# Patient Record
Sex: FEMALE | Race: WHITE | Employment: FULL TIME | RURAL
[De-identification: names, ages, dates, MRNs, and addresses within clinical notes are randomized per-mention and may not be internally consistent; named-entity substitution may affect disease eponyms.]

---

## 2017-01-09 DIAGNOSIS — N95.9 MENOPAUSAL DISORDER: ICD-10-CM

## 2017-02-06 ENCOUNTER — OFFICE VISIT (OUTPATIENT)
Dept: INTERNAL MEDICINE CLINIC | Age: 77
End: 2017-02-06

## 2017-02-06 VITALS
RESPIRATION RATE: 20 BRPM | TEMPERATURE: 96.1 F | SYSTOLIC BLOOD PRESSURE: 180 MMHG | HEIGHT: 61 IN | DIASTOLIC BLOOD PRESSURE: 80 MMHG | OXYGEN SATURATION: 98 % | BODY MASS INDEX: 25.68 KG/M2 | HEART RATE: 73 BPM | WEIGHT: 136 LBS

## 2017-02-06 DIAGNOSIS — R41.3 MEMORY LOSS: Primary | ICD-10-CM

## 2017-02-06 DIAGNOSIS — Z23 ENCOUNTER FOR IMMUNIZATION: ICD-10-CM

## 2017-02-06 NOTE — MR AVS SNAPSHOT
Visit Information Date & Time Provider Department Dept. Phone Encounter #  
 2/6/2017  1:30 PM Mohamud Yung, 1 [facility] [phone] [id number] Your Appointments 4/3/2017  8:15 AM  
ROUTINE CARE with MD Mayelin Ken ([facility]) Appt Note: f/u on general health $0 cp ls 12/2/16  
 [address] [place] [address]  
[phone]  
  
   
 [address] Upcoming Health Maintenance Date Due DTaP/Tdap/Td series (1 - Tdap) 8/21/1961 GLAUCOMA SCREENING Q2Y 11/1/2015 MEDICARE YEARLY EXAM 12/3/2017 Allergies as of 2/6/2017  Review Complete On: 2/6/2017 By: Mohamud Yung NP Severity Noted Reaction Type Reactions Naprosyn [Naproxen]  05/18/2015    Nausea Only Current Immunizations  Reviewed on 8/25/2016 Name Date H1N1 FLU VACCINE 3/24/2010 Influenza Vaccine 8/20/2014, 9/11/2013 Influenza Vaccine Neela Joaquim) 10/1/2015 Influenza Vaccine (Quad) PF 8/25/2016 Influenza Vaccine Split 9/13/2012, 10/6/2011 Influenza Vaccine Whole 10/7/2009 Pneumococcal Conjugate (PCV-13) 9/30/2016 Pneumococcal Vaccine (Unspecified Type) 4/28/2010 Tdap  Incomplete Zoster Vaccine, Live 4/8/2015 Not reviewed this visit You Were Diagnosed With   
  
 Codes Comments Memory loss    -  Primary ICD-10-CM: R41.3 ICD-9-CM: 780.93 Encounter for immunization     ICD-10-CM: R96 ICD-9-CM: V03.89 Vitals BP Pulse Temp Resp Height(growth percentile) Weight(growth percentile) 180/80 (BP 1 Location: Left arm, BP Patient Position: Sitting) 73 96.1 °F (35.6 °C) (Oral) 20 5' 1\" (1.549 m) 136 lb (61.7 kg) SpO2 BMI OB Status Smoking Status 98% 25.7 kg/m2 Hysterectomy Former Smoker BMI and BSA Data Body Mass Index Body Surface Area 25.7 kg/m 2 1.63 m 2 Preferred Pharmacy Pharmacy Name Phone [facility] [address], [address] [phone] Your Updated Medication List  
  
   
This list is accurate as of: 2/6/17  2:07 PM.  Always use your most recent med list.  
  
  
  
  
 alendronate 70 mg tablet Commonly known as:  FOSAMAX Take 1 Tab by mouth every seven (7) days. Calcium-Cholecalciferol (D3) 500 mg(1,250mg) -400 unit Tab Take  by mouth. co-enzyme Q-10 100 mg capsule Commonly known as:  CO Q-10 Take 1 capsule by mouth daily. Lactobacillus acidophilus 1 mg Wafr Take 1 capsule by mouth three (3) times daily. lisinopril 5 mg tablet Commonly known as:  Jacklynn Pares Take 1 Tab by mouth daily. pravastatin 40 mg tablet Commonly known as:  PRAVACHOL Take 1 Tab by mouth nightly. For cholesterol  
  
 raNITIdine 150 mg tablet Commonly known as:  ZANTAC TAKE ONE TABLET BY MOUTH TWICE DAILY FOR  ACID  REFLUX  
  
 traZODone 50 mg tablet Commonly known as:  Junella Brewster Up to 3 per night as needed We Performed the Following TETANUS, DIPHTHERIA TOXOIDS AND ACELLULAR PERTUSSIS VACCINE (TDAP), IN INDIVIDS. >=7, IM [id number] CPT(R)] Introducing [facility] & HEALTH SERVICES! Dear Ludmila Meyer: Thank you for requesting a payleven account. Our records indicate that you have previously registered for a payleven account but its currently inactive. Please call our payleven support line at [phone]. Additional Information If you have questions, please visit the Frequently Asked Questions section of the payleven website at [email or web address] Remember, payleven is NOT to be used for urgent needs. For medical emergencies, dial 911. Now available from your iPhone and Android! Please provide this summary of care documentation to your next provider. Your primary care clinician is listed as Arabella Senior.  If you have any questions after today's visit, please call [phone].

## 2017-02-06 NOTE — PROGRESS NOTES
In today for dementia testing - lots of trouble with her memory  Jillian Macdonald, [place]RA[place]  2/8/0714  8:32 PM

## 2017-02-15 NOTE — PROGRESS NOTES
HISTORY OF PRESENT ILLNESS  Laura Newell is a 67 y.o. female. HPI  Chief Complaint   Patient presents with    Memory Loss     wants dementia testing     States feels cannot remember names, actions. States does remember family members, how to get home and other familiar community places  Patient agrees to MMSE to evaluate short term memory loss. Request Tdap    Review of Systems   Constitutional: Negative. Negative for chills, fever and weight loss. HENT: Negative. Eyes: Negative. Respiratory: Negative. Cardiovascular: Negative for chest pain and leg swelling. Gastrointestinal: Negative. Negative for abdominal pain, diarrhea, nausea and vomiting. Genitourinary: Negative. Musculoskeletal: Negative. Skin: Negative. Neurological: Negative. Negative for dizziness, tingling and tremors. Psychiatric/Behavioral: Positive for memory loss. Physical Exam   Constitutional: She is oriented to person, place, and time. She appears well-developed and well-nourished. No distress. HENT:   Head: Normocephalic and atraumatic. Eyes: Conjunctivae are normal.   Cardiovascular: Normal rate, regular rhythm, normal heart sounds and intact distal pulses. Exam reveals no gallop and no friction rub. No murmur heard. Pulmonary/Chest: Effort normal and breath sounds normal. No respiratory distress. She has no wheezes. She has no rales. Musculoskeletal: Normal range of motion. Neurological: She is alert and oriented to person, place, and time. Skin: Skin is warm and dry. She is not diaphoretic. Psychiatric: She has a normal mood and affect. Her behavior is normal.   Nursing note and vitals reviewed. Plan of care and AVS reviewed with patient who verbalized understanding. ASSESSMENT and PLAN    ICD-10-CM ICD-9-CM    1. Memory loss R41.3 780.93    2. Encounter for immunization Z23 V03.89 TETANUS, DIPHTHERIA TOXOIDS AND ACELLULAR PERTUSSIS VACCINE (TDAP), IN INDIVIDS. >=7, IM   3. BMI 25 29.9    MMSE administered  30/30  Encouragement given to patient regarding memory loss. Encouraged to write on large calendar, keep storage drawers for specific items and to label objects. Encouraged to keep regular appointment with Dr. Luan Kanner. Discussed the patient's BMI with her. The BMI follow up plan is as follows:     I have reviewed/discussed the above normal BMI with the patient. I have recommended the following interventions: monitor weight . The plan is as follows: Monitor weight.

## 2017-04-03 ENCOUNTER — OFFICE VISIT (OUTPATIENT)
Dept: INTERNAL MEDICINE CLINIC | Age: 77
End: 2017-04-03

## 2017-04-03 VITALS
HEIGHT: 61 IN | DIASTOLIC BLOOD PRESSURE: 73 MMHG | BODY MASS INDEX: 25.11 KG/M2 | TEMPERATURE: 96.6 F | HEART RATE: 61 BPM | RESPIRATION RATE: 16 BRPM | WEIGHT: 133 LBS | OXYGEN SATURATION: 99 % | SYSTOLIC BLOOD PRESSURE: 149 MMHG

## 2017-04-03 DIAGNOSIS — N39.41 URGE INCONTINENCE OF URINE: Primary | ICD-10-CM

## 2017-04-03 DIAGNOSIS — E78.2 MIXED HYPERLIPIDEMIA: ICD-10-CM

## 2017-04-03 DIAGNOSIS — R73.03 PRE-DIABETES: ICD-10-CM

## 2017-04-03 DIAGNOSIS — I10 ESSENTIAL HYPERTENSION: ICD-10-CM

## 2017-04-03 LAB
BILIRUB UR QL STRIP: NEGATIVE
GLUCOSE UR-MCNC: NEGATIVE MG/DL
KETONES P FAST UR STRIP-MCNC: NEGATIVE MG/DL
PH UR STRIP: 5.5 [PH] (ref 4.6–8)
PROT UR QL STRIP: NEGATIVE MG/DL
SP GR UR STRIP: 1.02 (ref 1–1.03)
UA UROBILINOGEN AMB POC: NORMAL (ref 0.2–1)
URINALYSIS CLARITY POC: CLEAR
URINALYSIS COLOR POC: YELLOW
URINE BLOOD POC: NEGATIVE
URINE LEUKOCYTES POC: NORMAL
URINE NITRITES POC: NEGATIVE

## 2017-04-03 RX ORDER — TOLTERODINE 2 MG/1
2 CAPSULE, EXTENDED RELEASE ORAL DAILY
Qty: 30 CAP | Refills: 5 | Status: SHIPPED | OUTPATIENT
Start: 2017-04-03 | End: 2017-05-15 | Stop reason: SDUPTHER

## 2017-04-03 NOTE — PATIENT INSTRUCTIONS
Bladder Training: Care Instructions  Your Care Instructions  Bladder training is used to treat urge incontinence and stress incontinence. Urge incontinence means that the need to urinate comes on so fast that you can't get to a toilet in time. Stress incontinence means that you leak urine because of pressure on your bladder. For example, it may happen when you laugh, cough, or lift something heavy. Bladder training can increase how long you can wait before you have to urinate. It can also help your bladder hold more urine. And it can give you better control over the urge to urinate. It is important to remember that bladder training takes a few weeks to a few months to make a difference. You may not see results right away, but don't give up. Follow-up care is a key part of your treatment and safety. Be sure to make and go to all appointments, and call your doctor if you are having problems. It's also a good idea to know your test results and keep a list of the medicines you take. How can you care for yourself at home? Work with your doctor to come up with a bladder training program that is right for you. You may use one or more of the following methods. Delayed urination  · In the beginning, try to keep from urinating for 5 minutes after you first feel the need to go. · While you wait, take deep, slow breaths to relax. Kegel exercises can also help you delay the need to go to the bathroom. · After some practice, when you can easily wait 5 minutes to urinate, try to wait 10 minutes before you urinate. · Slowly increase the waiting period until you are able to control when you have to urinate. Scheduled urination  · Empty your bladder when you first wake up in the morning. · Schedule times throughout the day when you will urinate. · Start by going to the bathroom every hour, even if you don't need to go. · Slowly increase the time between trips to the bathroom.   · When you have found a schedule that works well for you, keep doing it. · If you wake up during the night and have to urinate, do it. Apply your schedule to waking hours only. Kegel exercises  These tighten and strengthen pelvic muscles, which can help you control the flow of urine. To do Kegel exercises:  · Squeeze the same muscles you would use to stop your urine. Your belly and thighs should not move. · Hold the squeeze for 3 seconds, and then relax for 3 seconds. · Start with 3 seconds. Then add 1 second each week until you are able to squeeze for 10 seconds. · Repeat the exercise 10 to 15 times a session. Do three or more sessions a day. When should you call for help? Watch closely for changes in your health, and be sure to contact your doctor if:  · Your incontinence is getting worse. · You do not get better as expected. Where can you learn more? Go to http://radha-zunilda.info/. Enter J154 in the search box to learn more about \"Bladder Training: Care Instructions. \"  Current as of: August 12, 2016  Content Version: 11.2  © 7140-4407 Healthwise, Incorporated. Care instructions adapted under license by [employer] (which disclaims liability or warranty for this information). If you have questions about a medical condition or this instruction, always ask your healthcare professional. [address] any warranty or liability for your use of this information.

## 2017-04-03 NOTE — PROGRESS NOTES
Subjective: Allyson Orozco is a 67 y.o. female who presents for follow up of hypertension and hyperlipidemia. New concerns: sone  recently of aneurysm. Bled to death from aneurysm when ruptured, Tx from [place] to [facility], surgery was too late. Lately noted some urine frequency, no dysuria. Sx x weeks, wants to be checked for DM. Has some pre Dm but no meds 4 that and BS min elevated. Takes chol and HTN meds, no issues. Past Surgical History:   Procedure Laterality Date    ABDOMEN SURGERY PROC UNLISTED      HX CATARACT REMOVAL      HX COLONOSCOPY      with EGD    HX GYN      hysterectomy    HX GYN      vaginal delivery x 3    HX KNEE REPLACEMENT Left 2016       Current Outpatient Prescriptions   Medication Sig Dispense Refill    raNITIdine (ZANTAC) 150 mg tablet TAKE ONE TABLET BY MOUTH TWICE DAILY FOR  ACID  REFLUX 60 Tab 11    alendronate (FOSAMAX) 70 mg tablet Take 1 Tab by mouth every seven (7) days. 5 Tab 5    pravastatin (PRAVACHOL) 40 mg tablet Take 1 Tab by mouth nightly. For cholesterol 90 Tab 3    lisinopril (PRINIVIL, ZESTRIL) 5 mg tablet Take 1 Tab by mouth daily. 90 Tab 1    traZODone (DESYREL) 50 mg tablet Up to 3 per night as needed 90 Tab 5    Calcium-Cholecalciferol, D3, 500 mg(1,250mg) -400 unit tab Take  by mouth.  lactobacillus acidophilus 1 mg wafr Take 1 capsule by mouth three (3) times daily. 90 Wafer 0    co-enzyme Q-10 (CO Q-10) 100 mg capsule Take 1 capsule by mouth daily. 90 capsule 1     Allergies   Allergen Reactions    Naprosyn [Naproxen] Nausea Only       Diet and Lifestyle: nonsmoker    Cardiovascular ROS: taking medications as instructed, no medication side effects noted, no TIA's, no chest pain on exertion, no dyspnea on exertion, no swelling of ankles. Review of Systems, additional:  Pertinent items are noted in HPI.       Social History   Substance Use Topics    Smoking status: Former Smoker     Packs/day: 1.00 Years: 0.00     Quit date: 5/11/1990    Smokeless tobacco: Never Used    Alcohol use No        Lab Results  Component Value Date/Time   Cholesterol, total 166 02/12/2016 02:19 PM   HDL Cholesterol 51 02/12/2016 02:19 PM   LDL, calculated 83 02/12/2016 02:19 PM   Triglyceride 160 02/12/2016 02:19 PM       Lab Results  Component Value Date/Time   GFR est AA 70 12/02/2016 09:06 AM   GFR est non-AA 61 12/02/2016 09:06 AM   Creatinine 0.91 12/02/2016 09:06 AM   BUN 16 12/02/2016 09:06 AM   Sodium 140 12/02/2016 09:06 AM   Potassium 4.9 12/02/2016 09:06 AM   Chloride 101 12/02/2016 09:06 AM   CO2 24 12/02/2016 09:06 AM      Lab Results   Component Value Date/Time    Glucose 107 12/02/2016 09:06 AM             Objective:     Physical exam significant for the following: WNL sad    Visit Vitals    /73 (BP 1 Location: Left arm, BP Patient Position: Sitting)    Pulse 61    Temp 96.6 °F (35.9 °C) (Oral)    Resp 16    Ht 5' 1\" (1.549 m)    Wt 133 lb (60.3 kg)    SpO2 99%    BMI 25.13 kg/m2     Appearance: alert, well appearing, and in no distress. General exam: CVS exam BP noted to be well controlled today in office, S1, S2 normal, no gallop, no murmur, chest clear, no JVD, no HSM, no edema. .   Assessment/Plan:     hypertension stable, hyperlipidemia stable. ICD-10-CM ICD-9-CM    1. Urge incontinence of urine N39.41 788.31 AMB POC URINALYSIS DIP STICK AUTO W/O MICRO      CULTURE, URINE   2. Mixed hyperlipidemia E78.2 272.2    3. Essential hypertension I10 401.9    4. Pre-diabetes R73.03 790.29 AMB POC GLUCOSE BLOOD, BY GLUCOSE MONITORING DEVICE      AMB POC GLUCOSE BLOOD, BY GLUCOSE MONITORING DEVICE     Orders Placed This Encounter    CULTURE, URINE    AMB POC URINALYSIS DIP STICK AUTO W/O MICRO    AMB POC GLUCOSE BLOOD, BY GLUCOSE MONITORING DEVICE    AMB POC GLUCOSE BLOOD, BY GLUCOSE MONITORING DEVICE    tolterodine ER (DETROL-LA) 2 mg ER capsule     Sig: Take 1 Cap by mouth daily.      Dispense: 30 Cap     Refill:  5     See patient instructions, went over them personally with the patient. Emphasized compliance. Questions answered. Patient states that they understand the plan of action and will call if there are any issues or misunderstandings. Follow-up Disposition:  Return in about 6 weeks (around 5/15/2017) for routine follow up.

## 2017-04-03 NOTE — PROGRESS NOTES
Patient wants to have her blood sugar checked - fasting today - trouble controlling bladder  Umu Meyers LPN  0/6/0909  3:14 AM  Oldest son passed away on 3/29/17  Umu Meyers LPN  5/5/2171  1:61 AM

## 2017-04-03 NOTE — MR AVS SNAPSHOT
Visit Information Date & Time Provider Department Dept. Phone Encounter #  
 4/3/2017  8:15 AM Mandy Olivares  C[place]thia [place] [id number] Follow-up Instructions Return in about 6 weeks (around 5/15/2017) for routine follow up. Upcoming Health Maintenance Date Due  
 GLAUCOMA SCREENING Q2Y 4/30/2017* MEDICARE YEARLY EXAM 12/3/2017 DTaP/Tdap/Td series (2 - Td) 2/6/2027 *Topic was postponed. The date shown is not the original due date. Allergies as of 4/3/2017  Review Complete On: 4/3/2017 By: Minerva Byrd LPN Severity Noted Reaction Type Reactions Naprosyn [Naproxen]  05/18/2015    Nausea Only Current Immunizations  Reviewed on 2/6/2017 Name Date H1N1 FLU VACCINE 3/24/2010 Influenza Vaccine 8/20/2014, 9/11/2013 Influenza Vaccine Britton Mitten) 10/1/2015 Influenza Vaccine (Quad) PF 8/25/2016 Influenza Vaccine Split 9/13/2012, 10/6/2011 Influenza Vaccine Whole 10/7/2009 Pneumococcal Conjugate (PCV-13) 9/30/2016 Pneumococcal Vaccine (Unspecified Type) 4/28/2010 Tdap 2/6/2017 Zoster Vaccine, Live 4/8/2015 Not reviewed this visit You Were Diagnosed With   
  
 Codes Comments Urge incontinence of urine    -  Primary ICD-10-CM: N39.41 
ICD-9-CM: 788.31 Mixed hyperlipidemia     ICD-10-CM: E78.2 ICD-9-CM: 272.2 Essential hypertension     ICD-10-CM: I10 
ICD-9-CM: 401.9 Vitals BP Pulse Temp Resp Height(growth percentile) Weight(growth percentile) 149/73 (BP 1 Location: Left arm, BP Patient Position: Sitting) 61 96.6 °F (35.9 °C) (Oral) 16 5' 1\" (1.549 m) 133 lb (60.3 kg) SpO2 BMI OB Status Smoking Status 99% 25.13 kg/m2 Hysterectomy Former Smoker Vitals History BMI and BSA Data Body Mass Index Body Surface Area  
 25.13 kg/m 2 1.61 m 2 Preferred Pharmacy Pharmacy Name Phone  [facility] [address] [facility] [phone] Your Updated Medication List  
  
   
This list is accurate as of: 4/3/17  9:41 AM.  Always use your most recent med list.  
  
  
  
  
 alendronate 70 mg tablet Commonly known as:  FOSAMAX Take 1 Tab by mouth every seven (7) days. Calcium-Cholecalciferol (D3) 500 mg(1,250mg) -400 unit Tab Take  by mouth. co-enzyme Q-10 100 mg capsule Commonly known as:  CO Q-10 Take 1 capsule by mouth daily. Lactobacillus acidophilus 1 mg Wafr Take 1 capsule by mouth three (3) times daily. lisinopril 5 mg tablet Commonly known as:  Jeffrey Leaver Take 1 Tab by mouth daily. pravastatin 40 mg tablet Commonly known as:  PRAVACHOL Take 1 Tab by mouth nightly. For cholesterol  
  
 raNITIdine 150 mg tablet Commonly known as:  ZANTAC TAKE ONE TABLET BY MOUTH TWICE DAILY FOR  ACID  REFLUX  
  
 tolterodine ER 2 mg ER capsule Commonly known as:  DETROL-LA Take 1 Cap by mouth daily. traZODone 50 mg tablet Commonly known as:  Shelva Okemah Up to 3 per night as needed Prescriptions Sent to Pharmacy Refills  
 tolterodine ER (DETROL-LA) 2 mg ER capsule 5 Sig: Take 1 Cap by mouth daily. Class: Normal  
 Pharmacy: [id number] [facility] [place] [address] [facility] Ph #: [phone] Route: Oral  
  
We Performed the Following AMB POC URINALYSIS DIP STICK AUTO W/O MICRO [66056 CPT(R)] CULTURE, URINE [id number] CPT(R)] Follow-up Instructions Return in about 6 weeks (around 5/15/2017) for routine follow up. Patient Instructions Bladder Training: Care Instructions Your Care Instructions Bladder training is used to treat urge incontinence and stress incontinence. Urge incontinence means that the need to urinate comes on so fast that you can't get to a toilet in time. Stress incontinence means that you leak urine because of pressure on your bladder.  For example, it may happen when you laugh, cough, or lift something heavy. Bladder training can increase how long you can wait before you have to urinate. It can also help your bladder hold more urine. And it can give you better control over the urge to urinate. It is important to remember that bladder training takes a few weeks to a few months to make a difference. You may not see results right away, but don't give up. Follow-up care is a key part of your treatment and safety. Be sure to make and go to all appointments, and call your doctor if you are having problems. It's also a good idea to know your test results and keep a list of the medicines you take. How can you care for yourself at home? Work with your doctor to come up with a bladder training program that is right for you. You may use one or more of the following methods. Delayed urination · In the beginning, try to keep from urinating for 5 minutes after you first feel the need to go. · While you wait, take deep, slow breaths to relax. Kegel exercises can also help you delay the need to go to the bathroom. · After some practice, when you can easily wait 5 minutes to urinate, try to wait 10 minutes before you urinate. · Slowly increase the waiting period until you are able to control when you have to urinate. Scheduled urination · Empty your bladder when you first wake up in the morning. · Schedule times throughout the day when you will urinate. · Start by going to the bathroom every hour, even if you don't need to go. · Slowly increase the time between trips to the bathroom. · When you have found a schedule that works well for you, keep doing it. · If you wake up during the night and have to urinate, do it. Apply your schedule to waking hours only. Kegel exercises These tighten and strengthen pelvic muscles, which can help you control the flow of urine. To do Kegel exercises: 
· Squeeze the same muscles you would use to stop your urine.  Your belly and thighs should not move. · Hold the squeeze for 3 seconds, and then relax for 3 seconds. · Start with 3 seconds. Then add 1 second each week until you are able to squeeze for 10 seconds. · Repeat the exercise 10 to 15 times a session. Do three or more sessions a day. When should you call for help? Watch closely for changes in your health, and be sure to contact your doctor if: 
· Your incontinence is getting worse. · You do not get better as expected. Where can you learn more? Go to http://radha-zunilda.info/. Enter K427 in the search box to learn more about \"Bladder Training: Care Instructions. \" Current as of: August 12, 2016 Content Version: 11.2 © 5429-7614 Edutor. Care instructions adapted under license by [employer] (which disclaims liability or warranty for this information). If you have questions about a medical condition or this instruction, always ask your healthcare professional. [place] [address] any warranty or liability for your use of this information. Introducing [facility] & HEALTH SERVICES! Dear Linnea Taveras: Thank you for requesting a Tumri account. Our records indicate that you have previously registered for a Tumri account but its currently inactive. Please call our Tumri support line at [phone]. Additional Information If you have questions, please visit the Frequently Asked Questions section of the Tumri website at [email or web address] [email or web address]/. Remember, Tumri is NOT to be used for urgent needs. For medical emergencies, dial 911. Now available from your iPhone and Android! Please provide this summary of care documentation to your next provider. Your primary care clinician is listed as Verner Knack. If you have any questions after today's visit, please call [phone].

## 2017-04-04 LAB
BACTERIA UR CULT: ABNORMAL
GLUCOSE POC: 107 MG/DL

## 2017-04-05 ENCOUNTER — TELEPHONE (OUTPATIENT)
Dept: INTERNAL MEDICINE CLINIC | Age: 77
End: 2017-04-05

## 2017-04-06 ENCOUNTER — TELEPHONE (OUTPATIENT)
Dept: INTERNAL MEDICINE CLINIC | Age: 77
End: 2017-04-06

## 2017-04-06 RX ORDER — AMOXICILLIN 500 MG/1
500 TABLET, FILM COATED ORAL 3 TIMES DAILY
Qty: 15 TAB | Refills: 0 | Status: SHIPPED | OUTPATIENT
Start: 2017-04-06 | End: 2017-04-11

## 2017-04-06 NOTE — TELEPHONE ENCOUNTER
Patient states that Dr Sukhdeep Bhagat left her a message that she needed an antibiotic, she is at work and not able to talk while she is there on her phone - she requests that he send the antibiotic she needs to [facility], and she will pick it up today  Dania Hdez LPN  9/9/8130  4:44 AM

## 2017-05-15 ENCOUNTER — OFFICE VISIT (OUTPATIENT)
Dept: INTERNAL MEDICINE CLINIC | Age: 77
End: 2017-05-15

## 2017-05-15 VITALS
BODY MASS INDEX: 25.49 KG/M2 | SYSTOLIC BLOOD PRESSURE: 142 MMHG | OXYGEN SATURATION: 98 % | WEIGHT: 135 LBS | HEIGHT: 61 IN | TEMPERATURE: 97.1 F | DIASTOLIC BLOOD PRESSURE: 80 MMHG | HEART RATE: 79 BPM | RESPIRATION RATE: 16 BRPM

## 2017-05-15 DIAGNOSIS — N18.1 CRI (CHRONIC RENAL INSUFFICIENCY), STAGE 1: ICD-10-CM

## 2017-05-15 DIAGNOSIS — K21.9 GASTROESOPHAGEAL REFLUX DISEASE WITHOUT ESOPHAGITIS: ICD-10-CM

## 2017-05-15 DIAGNOSIS — M81.0 OSTEOPOROSIS WITHOUT CURRENT PATHOLOGICAL FRACTURE, UNSPECIFIED OSTEOPOROSIS TYPE: ICD-10-CM

## 2017-05-15 DIAGNOSIS — I10 ESSENTIAL HYPERTENSION: Primary | ICD-10-CM

## 2017-05-15 DIAGNOSIS — E78.2 MIXED HYPERLIPIDEMIA: ICD-10-CM

## 2017-05-15 PROBLEM — N18.9 CRI (CHRONIC RENAL INSUFFICIENCY): Status: ACTIVE | Noted: 2017-05-15

## 2017-05-15 PROBLEM — N18.9 CRI (CHRONIC RENAL INSUFFICIENCY): Status: RESOLVED | Noted: 2017-05-15 | Resolved: 2017-05-15

## 2017-05-15 RX ORDER — TOLTERODINE 2 MG/1
2 CAPSULE, EXTENDED RELEASE ORAL DAILY
Qty: 90 CAP | Refills: 3 | Status: SHIPPED | OUTPATIENT
Start: 2017-05-15 | End: 2018-06-08 | Stop reason: SDUPTHER

## 2017-05-15 RX ORDER — ALENDRONATE SODIUM 70 MG/1
70 TABLET ORAL
Qty: 5 TAB | Refills: 11 | Status: SHIPPED | OUTPATIENT
Start: 2017-05-15 | End: 2018-06-11 | Stop reason: SDUPTHER

## 2017-05-15 NOTE — PROGRESS NOTES
Patient in for followup of blood pressure and urine infection - knot and bruise on lower left arm X 3 days  Vanessa De León LPN  3/68/2348  8:96 AM

## 2017-05-15 NOTE — LETTER
5/19/2017 4:07 PM 
 
Ms. Kaylynn Jacobson [address] [place] [address] Dear Kaylynn Jacobson: YOUR RECENT LABS ARE NORMAL Please find your most recent results below. Resulted Orders METABOLIC PANEL, BASIC Result Value Ref Range Glucose 93 65 - 99 mg/dL BUN 21 8 - 27 mg/dL Creatinine 1.00 0.57 - 1.00 mg/dL GFR est non-AA 54 (L) >59 mL/min/1.73 GFR est AA 63 >59 mL/min/1.73  
 BUN/Creatinine ratio 21 12 - 28 Sodium 140 134 - 144 mmol/L Potassium 4.3 3.5 - 5.2 mmol/L Chloride 102 96 - 106 mmol/L  
 CO2 24 18 - 29 mmol/L Calcium 9.4 8.7 - 10.3 mg/dL Narrative Performed at:  [address]  [id number] : Lynne Beverly MD, Phone:  [phone] CBC W/O DIFF Result Value Ref Range WBC 7.7 3.4 - 10.8 x10E3/uL  
 RBC 4.06 3.77 - 5.28 x10E6/uL HGB 11.5 11.1 - 15.9 g/dL HCT 35.9 34.0 - 46.6 % MCV 88 79 - 97 fL  
 MCH 28.3 26.6 - 33.0 pg  
 MCHC 32.0 31.5 - 35.7 g/dL  
 RDW 14.6 12.3 - 15.4 % PLATELET 494 979 - 646 x10E3/uL Narrative Performed at:  [address]  [id number] : Lynne Beverly MD, Phone:  [phone] LIPID PANEL Result Value Ref Range Cholesterol, total 232 (H) 100 - 199 mg/dL Triglyceride 156 (H) 0 - 149 mg/dL HDL Cholesterol 50 >39 mg/dL VLDL, calculated 31 5 - 40 mg/dL LDL, calculated 151 (H) 0 - 99 mg/dL Narrative Performed at:  [address]  [id number] : Lynne Beverly MD, Phone:  [phone] CVD REPORT Result Value Ref Range INTERPRETATION NTAP   
 PDF IMAGE Not applicable Narrative Performed at:  [address] [place] [address] [address] [place] [address]  [id number] : Kita Moscoso PhD, Phone:  [phone] CKD REPORT Result Value Ref Range Interpretation Note Comment: Supplement report is available. Narrative Performed at:  [address] [place] [address]  [id number] : Jessika Garcias PhD, Phone:  [phone] RECOMMENDATIONS: 
KEEP UP THE GOOD WORK Please call me if you have any questions: [phone] Sincerely, Sesar Basilio MD

## 2017-05-15 NOTE — PROGRESS NOTES
Subjective: Yenifer Xiao is a 67 y.o. female who presents for follow up of hypertension and hyperlipidemia. New concerns: es son who recently passed away. Still working at [employer], notes a bruice on her left forearm. Does not recall any trauma. No other bruices. Min c/o pain. Current Outpatient Prescriptions   Medication Sig Dispense Refill    lisinopril (PRINIVIL, ZESTRIL) 5 mg tablet TAKE ONE TABLET BY MOUTH ONCE DAILY 90 Tab 2    tolterodine ER (DETROL-LA) 2 mg ER capsule Take 1 Cap by mouth daily. 30 Cap 5    raNITIdine (ZANTAC) 150 mg tablet TAKE ONE TABLET BY MOUTH TWICE DAILY FOR  ACID  REFLUX 60 Tab 11    alendronate (FOSAMAX) 70 mg tablet Take 1 Tab by mouth every seven (7) days. 5 Tab 5    pravastatin (PRAVACHOL) 40 mg tablet Take 1 Tab by mouth nightly. For cholesterol 90 Tab 3    traZODone (DESYREL) 50 mg tablet Up to 3 per night as needed 90 Tab 5    Calcium-Cholecalciferol, D3, 500 mg(1,250mg) -400 unit tab Take  by mouth.  co-enzyme Q-10 (CO Q-10) 100 mg capsule Take 1 capsule by mouth daily. 90 capsule 1     Allergies   Allergen Reactions    Naprosyn [Naproxen] Nausea Only       Diet and Lifestyle: nonsmoker    Cardiovascular ROS: taking medications as instructed, no medication side effects noted, no TIA's, no chest pain on exertion, no dyspnea on exertion, no swelling of ankles. Review of Systems, additional:  Pertinent items are noted in HPI.       Patient Active Problem List    Diagnosis Date Noted    CRI (chronic renal insufficiency) 05/15/2017    Essential hypertension 08/25/2016    Diverticulosis 05/08/2014    Osteoarthritis, knee 12/18/2012    Osteoporosis 12/13/2012    Pre-diabetes 12/13/2012    Hypertriglyceridemia 06/16/2011    Hyperlipidemia 05/11/2010     Social History   Substance Use Topics    Smoking status: Former Smoker     Packs/day: 1.00     Years: 0.00     Quit date: 5/11/1990    Smokeless tobacco: Never Used    Alcohol use No Lab Results  Component Value Date/Time   WBC 12.0 06/10/2015 08:38 AM   HGB 12.0 06/10/2015 08:38 AM   HCT 37.0 06/10/2015 08:38 AM   PLATELET 219 72/48/2373 08:38 AM   MCV 89 06/10/2015 08:38 AM       Lab Results  Component Value Date/Time   Cholesterol, total 166 02/12/2016 02:19 PM   HDL Cholesterol 51 02/12/2016 02:19 PM   LDL, calculated 83 02/12/2016 02:19 PM   Triglyceride 160 02/12/2016 02:19 PM       Lab Results  Component Value Date/Time   GFR est AA 70 12/02/2016 09:06 AM   GFR est non-AA 61 12/02/2016 09:06 AM   Creatinine 0.91 12/02/2016 09:06 AM   BUN 16 12/02/2016 09:06 AM   Sodium 140 12/02/2016 09:06 AM   Potassium 4.9 12/02/2016 09:06 AM   Chloride 101 12/02/2016 09:06 AM   CO2 24 12/02/2016 09:06 AM      Lab Results   Component Value Date/Time    Glucose 107 12/02/2016 09:06 AM    Glucose  04/04/2017 01:54 PM             Objective:     Physical exam significant for the following: elderly NAD  Visit Vitals    /59 (BP 1 Location: Left arm, BP Patient Position: Sitting)    Pulse 79    Temp 97.1 °F (36.2 °C) (Oral)    Resp 16    Ht 5' 1\" (1.549 m)    Wt 135 lb (61.2 kg)    SpO2 98%    BMI 25.51 kg/m2     Appearance: alert, well appearing, and in no distress. General exam: CVS exam BP noted to be well controlled today in office, S1, S2 normal, no gallop, no murmur, chest clear, no JVD, no HSM, no edema. 2x2 bruice left forearm. No other bruices  . Assessment/Plan:     hypertension stable, hyperlipidemia stable. ICD-10-CM ICD-9-CM    1. Essential hypertension G47 003.5 METABOLIC PANEL, BASIC   2. CRI (chronic renal insufficiency), stage 1 N18.1 585.1    3. Osteoporosis without current pathological fracture, unspecified osteoporosis type M81.0 733.00    4. Mixed hyperlipidemia E78.2 272.2 LIPID PANEL   5.  Gastroesophageal reflux disease without esophagitis K21.9 530.81 CBC W/O DIFF     Orders Placed This Encounter    METABOLIC PANEL, BASIC    CBC W/O DIFF    LIPID PANEL    alendronate (FOSAMAX) 70 mg tablet     Sig: Take 1 Tab by mouth every seven (7) days. Dispense:  5 Tab     Refill:  11    tolterodine ER (DETROL-LA) 2 mg ER capsule     Sig: Take 1 Cap by mouth daily. Dispense:  90 Cap     Refill:  3     HTN stable  Inc chol stable  Osteoporosis cont fosamax. Follow-up Disposition:  Return in about 4 months (around 9/15/2017) for routine follow up.

## 2017-05-15 NOTE — MR AVS SNAPSHOT
Visit Information Date & Time Provider Department Dept. Phone Encounter #  
 5/15/2017  9:15 AM Ernie Tee  [facility]  [id number] Follow-up Instructions Return in about 4 months (around 9/15/2017) for routine follow up. Upcoming Health Maintenance Date Due  
 GLAUCOMA SCREENING Q2Y 11/1/2015 INFLUENZA AGE 9 TO ADULT 8/1/2017 MEDICARE YEARLY EXAM 12/3/2017 DTaP/Tdap/Td series (2 - Td) 2/6/2027 Allergies as of 5/15/2017  Review Complete On: 5/15/2017 By: Ernie Tee MD  
  
 Severity Noted Reaction Type Reactions Naprosyn [Naproxen]  05/18/2015    Nausea Only Current Immunizations  Reviewed on 2/6/2017 Name Date H1N1 FLU VACCINE 3/24/2010 Influenza Vaccine 8/20/2014, 9/11/2013 Influenza Vaccine Yuliamary Garcia) 10/1/2015 Influenza Vaccine (Quad) PF 8/25/2016 Influenza Vaccine Split 9/13/2012, 10/6/2011 Influenza Vaccine Whole 10/7/2009 Pneumococcal Conjugate (PCV-13) 9/30/2016 Pneumococcal Vaccine (Unspecified Type) 4/28/2010 Tdap 2/6/2017 Zoster Vaccine, Live 4/8/2015 Not reviewed this visit You Were Diagnosed With   
  
 Codes Comments Essential hypertension    -  Primary ICD-10-CM: I10 
ICD-9-CM: 401.9 CRI (chronic renal insufficiency), stage 1     ICD-10-CM: N18.1 ICD-9-CM: 585.1 Osteoporosis without current pathological fracture, unspecified osteoporosis type     ICD-10-CM: M81.0 ICD-9-CM: 733.00 Mixed hyperlipidemia     ICD-10-CM: E78.2 ICD-9-CM: 272.2 Gastroesophageal reflux disease without esophagitis     ICD-10-CM: K21.9 ICD-9-CM: 530.81 Vitals BP Pulse Temp Resp Height(growth percentile) Weight(growth percentile) 142/80 79 97.1 °F (36.2 °C) (Oral) 16 5' 1\" (1.549 m) 135 lb (61.2 kg) SpO2 BMI OB Status Smoking Status 98% 25.51 kg/m2 Hysterectomy Former Smoker Vitals History BMI and BSA Data Body Mass Index Body Surface Area 25.51 kg/m 2 1.62 m 2 Preferred Pharmacy Pharmacy Name Phone [facility] [address], [address] [phone] Your Updated Medication List  
  
   
This list is accurate as of: 5/15/17 10:07 AM.  Always use your most recent med list.  
  
  
  
  
 alendronate 70 mg tablet Commonly known as:  FOSAMAX Take 1 Tab by mouth every seven (7) days. Calcium-Cholecalciferol (D3) 500 mg(1,250mg) -400 unit Tab Take  by mouth. co-enzyme Q-10 100 mg capsule Commonly known as:  CO Q-10 Take 1 capsule by mouth daily. lisinopril 5 mg tablet Commonly known as:  PRINIVIL, ZESTRIL  
TAKE ONE TABLET BY MOUTH ONCE DAILY pravastatin 40 mg tablet Commonly known as:  PRAVACHOL Take 1 Tab by mouth nightly. For cholesterol  
  
 raNITIdine 150 mg tablet Commonly known as:  ZANTAC TAKE ONE TABLET BY MOUTH TWICE DAILY FOR  ACID  REFLUX  
  
 tolterodine ER 2 mg ER capsule Commonly known as:  DETROL-LA Take 1 Cap by mouth daily. traZODone 50 mg tablet Commonly known as:  Mati Stager Up to 3 per night as needed We Performed the Following CBC W/O DIFF [78010 CPT(R)] LIPID PANEL [54081 CPT(R)] METABOLIC PANEL, BASIC [45486 CPT(R)] Follow-up Instructions Return in about 4 months (around 9/15/2017) for routine follow up. Introducing [facility] & HE[facility]! Dear Colt Colin: Thank you for requesting a [employer] account. Our records indicate that you have previously registered for a [employer] account but its currently inactive. Please call our [employer] support line at [phone]. Additional Information If you have questions, please visit the Frequently Asked Questions section of the [employer] website at [email or web address] Remember, [employer] is NOT to be used for urgent needs. For medical emergencies, dial 911. Now available from your iPhone and Android! Please provide this summary of care documentation to your next provider. Your primary care clinician is listed as Douglas Hurt. If you have any questions after today's visit, please call [phone].

## 2017-05-16 DIAGNOSIS — M17.12 PRIMARY OSTEOARTHRITIS OF LEFT KNEE: ICD-10-CM

## 2017-05-16 LAB
BUN SERPL-MCNC: 21 MG/DL (ref 8–27)
BUN/CREAT SERPL: 21 (ref 12–28)
CALCIUM SERPL-MCNC: 9.4 MG/DL (ref 8.7–10.3)
CHLORIDE SERPL-SCNC: 102 MMOL/L (ref 96–106)
CHOLEST SERPL-MCNC: 232 MG/DL (ref 100–199)
CO2 SERPL-SCNC: 24 MMOL/L (ref 18–29)
CREAT SERPL-MCNC: 1 MG/DL (ref 0.57–1)
ERYTHROCYTE [DISTWIDTH] IN BLOOD BY AUTOMATED COUNT: 14.6 % (ref 12.3–15.4)
GLUCOSE SERPL-MCNC: 93 MG/DL (ref 65–99)
HCT VFR BLD AUTO: 35.9 % (ref 34–46.6)
HDLC SERPL-MCNC: 50 MG/DL
HGB BLD-MCNC: 11.5 G/DL (ref 11.1–15.9)
INTERPRETATION, 910389: NORMAL
INTERPRETATION: NORMAL
LDLC SERPL CALC-MCNC: 151 MG/DL (ref 0–99)
MCH RBC QN AUTO: 28.3 PG (ref 26.6–33)
MCHC RBC AUTO-ENTMCNC: 32 G/DL (ref 31.5–35.7)
MCV RBC AUTO: 88 FL (ref 79–97)
PDF IMAGE, 910387: NORMAL
PLATELET # BLD AUTO: 265 X10E3/UL (ref 150–379)
POTASSIUM SERPL-SCNC: 4.3 MMOL/L (ref 3.5–5.2)
RBC # BLD AUTO: 4.06 X10E6/UL (ref 3.77–5.28)
SODIUM SERPL-SCNC: 140 MMOL/L (ref 134–144)
TRIGL SERPL-MCNC: 156 MG/DL (ref 0–149)
VLDLC SERPL CALC-MCNC: 31 MG/DL (ref 5–40)
WBC # BLD AUTO: 7.7 X10E3/UL (ref 3.4–10.8)

## 2017-05-16 RX ORDER — TRAZODONE HYDROCHLORIDE 50 MG/1
TABLET ORAL
Qty: 90 TAB | Refills: 1 | Status: SHIPPED | OUTPATIENT
Start: 2017-05-16 | End: 2017-09-11 | Stop reason: SDUPTHER

## 2017-05-16 NOTE — TELEPHONE ENCOUNTER
Requested Prescriptions     Pending Prescriptions Disp Refills    traZODone (DESYREL) 50 mg tablet [Pharmacy Med Name: TRAZODONE 50MG      TAB] 90 Tab 1     Sig: TAKE UP TO 3 PER NIGHT AS NEEDED BY MOUTH.      Last office visit 5/15/17  Future office visit 9/11/17  Last filled  8/5/16  Changes made to medication on last visit  none

## 2017-09-11 ENCOUNTER — OFFICE VISIT (OUTPATIENT)
Dept: INTERNAL MEDICINE CLINIC | Age: 77
End: 2017-09-11

## 2017-09-11 VITALS
TEMPERATURE: 98.1 F | RESPIRATION RATE: 16 BRPM | OXYGEN SATURATION: 96 % | BODY MASS INDEX: 26.06 KG/M2 | WEIGHT: 138 LBS | HEART RATE: 70 BPM | DIASTOLIC BLOOD PRESSURE: 80 MMHG | SYSTOLIC BLOOD PRESSURE: 138 MMHG | HEIGHT: 61 IN

## 2017-09-11 DIAGNOSIS — E78.5 HYPERLIPIDEMIA, UNSPECIFIED HYPERLIPIDEMIA TYPE: ICD-10-CM

## 2017-09-11 DIAGNOSIS — I10 ESSENTIAL HYPERTENSION: Primary | ICD-10-CM

## 2017-09-11 DIAGNOSIS — K57.30 DIVERTICULOSIS OF LARGE INTESTINE WITHOUT HEMORRHAGE: ICD-10-CM

## 2017-09-11 DIAGNOSIS — M17.12 PRIMARY OSTEOARTHRITIS OF LEFT KNEE: ICD-10-CM

## 2017-09-11 DIAGNOSIS — Z23 ENCOUNTER FOR IMMUNIZATION: ICD-10-CM

## 2017-09-11 DIAGNOSIS — M81.0 OSTEOPOROSIS WITHOUT CURRENT PATHOLOGICAL FRACTURE, UNSPECIFIED OSTEOPOROSIS TYPE: ICD-10-CM

## 2017-09-11 RX ORDER — NAPROXEN SODIUM 220 MG
220 TABLET ORAL 2 TIMES DAILY WITH MEALS
COMMUNITY
End: 2018-07-25 | Stop reason: ALTCHOICE

## 2017-09-11 RX ORDER — TRAZODONE HYDROCHLORIDE 50 MG/1
TABLET ORAL
Qty: 90 TAB | Refills: 5 | Status: SHIPPED | OUTPATIENT
Start: 2017-09-11 | End: 2018-09-10 | Stop reason: SDUPTHER

## 2017-09-11 RX ORDER — LOPERAMIDE HCL 2 MG
2 TABLET ORAL
COMMUNITY
End: 2018-06-11 | Stop reason: ALTCHOICE

## 2017-09-11 NOTE — MR AVS SNAPSHOT
Visit Information Date & Time Provider Department Dept. Phone Encounter #  
 9/11/2017  9:15 AM Belia Ho  [facility]  [id number] Follow-up Instructions Return in about 4 months (around 1/11/2018) for medicare annual.  
  
Upcoming Health Maintenance Date Due  
 GLAUCOMA SCREENING Q2Y 11/1/2015 INFLUENZA AGE 9 TO ADULT 8/1/2017 MEDICARE YEARLY EXAM 12/3/2017 DTaP/Tdap/Td series (2 - Td) 2/6/2027 Allergies as of 9/11/2017  Review Complete On: 9/11/2017 By: Belia Ho MD  
  
 Severity Noted Reaction Type Reactions Naprosyn [Naproxen]  05/18/2015    Nausea Only Current Immunizations  Reviewed on 2/6/2017 Name Date H1N1 FLU VACCINE 3/24/2010 Influenza High Dose Vaccine PF  Incomplete Influenza Vaccine 8/20/2014, 9/11/2013 Influenza Vaccine Traci Mathew) 10/1/2015 Influenza Vaccine (Quad) PF 8/25/2016 Influenza Vaccine Split 9/13/2012, 10/6/2011 Influenza Vaccine Whole 10/7/2009 Pneumococcal Conjugate (PCV-13) 9/30/2016 Tdap 2/6/2017 ZZZ-RETIRED (DO NOT USE) Pneumococcal Vaccine (Unspecified Type) 4/28/2010 Zoster Vaccine, Live 4/8/2015 Not reviewed this visit You Were Diagnosed With   
  
 Codes Comments Essential hypertension    -  Primary ICD-10-CM: I10 
ICD-9-CM: 401.9 Hyperlipidemia, unspecified hyperlipidemia type     ICD-10-CM: E78.5 ICD-9-CM: 272.4 Osteoporosis without current pathological fracture, unspecified osteoporosis type     ICD-10-CM: M81.0 ICD-9-CM: 733.00 Diverticulosis of large intestine without hemorrhage     ICD-10-CM: K57.30 ICD-9-CM: 562.10 Primary osteoarthritis of left knee     ICD-10-CM: M17.12 
ICD-9-CM: 715.16 Encounter for immunization     ICD-10-CM: N27 ICD-9-CM: V03.89 Vitals BP Pulse Temp Resp Height(growth percentile) Weight(growth percentile) 138/80 70 98.1 °F (36.7 °C) (Oral) 16 5' 1\" (1.549 m) 138 lb (62.6 kg) SpO2 BMI OB Status Smoking Status 96% 26.07 kg/m2 Hysterectomy Former Smoker Vitals History BMI and BSA Data Body Mass Index Body Surface Area 26.07 kg/m 2 1.64 m 2 Preferred Pharmacy Pharmacy Name Phone [facility] [address] [facility], [address] [facility] [phone] Your Updated Medication List  
  
   
This list is accurate as of: 9/11/17 10:13 AM.  Always use your most recent med list.  
  
  
  
  
 alendronate 70 mg tablet Commonly known as:  FOSAMAX Take 1 Tab by mouth every seven (7) days. ALEVE 220 mg tablet Generic drug:  naproxen sodium Take 220 mg by mouth two (2) times daily (with meals). Calcium-Cholecalciferol (D3) 500 mg(1,250mg) -400 unit Tab Take  by mouth. co-enzyme Q-10 100 mg capsule Commonly known as:  CO Q-10 Take 1 capsule by mouth daily. lisinopril 5 mg tablet Commonly known as:  PRINIVIL, ZESTRIL  
TAKE ONE TABLET BY MOUTH ONCE DAILY  
  
 loperamide 2 mg tablet Commonly known as:  IMMODIUM Take 2 mg by mouth four (4) times daily as needed for Diarrhea. raNITIdine 150 mg tablet Commonly known as:  ZANTAC TAKE ONE TABLET BY MOUTH TWICE DAILY FOR  ACID  REFLUX  
  
 tolterodine ER 2 mg ER capsule Commonly known as:  DETROL-LA Take 1 Cap by mouth daily. traZODone 50 mg tablet Commonly known as:  DESYREL  
TAKE UP TO 3 PER NIGHT AS NEEDED BY MOUTH. Prescriptions Sent to Pharmacy Refills  
 traZODone (DESYREL) 50 mg tablet 5 Sig: TAKE UP TO 3 PER NIGHT AS NEEDED BY MOUTH. Class: Normal  
 Pharmacy: [id number] [facility], [address] [facility] Ph #: [phone] We Performed the Following ADMIN INFLUENZA VIRUS VAC [ [facility]] INFLUENZA VIRUS VACCINE, HIGH DOSE SEASONAL, PRESERVATIVE FREE [80116 CPT(R)] Follow-up Instructions  Return in about 4 months (around 1/11/2018) for medicare annual.  
  
 Patient Instructions OK to leave off cholesterol pill. Introducing [facility]! Dear Elsie Barrera: Thank you for requesting a [employer] account. Our records indicate that you have previously registered for a [employer] account but its currently inactive. Please call our [employer] support line at [phone]. Additional Information If you have questions, please visit the Frequently Asked Questions section of the [employer] website at [email or web address] Remember, [employer] is NOT to be used for urgent needs. For medical emergencies, dial 911. Now available from your iPhone and Android! Please provide this summary of care documentation to your next provider. Your primary care clinician is listed as Felix Olivares. If you have any questions after today's visit, please call [phone].

## 2017-09-11 NOTE — PROGRESS NOTES
Chief Complaint   Patient presents with    Hypertension     follow up     I have reviewed the patient's medical history in detail and updated the computerized patient record. Health Maintenance reviewed. 1. Have you been to the ER, urgent care clinic since your last visit? Hospitalized since your last visit?no    2. Have you seen or consulted any other health care providers outside of the [address] [place] [address] since your last visit? Include any pap smears or colon screening.  No    Encouraged pt to discuss pt's wishes with spouse/partner/family and bring them in the next appt to follow thru with the Advanced Directive

## 2017-09-11 NOTE — PROGRESS NOTES
Subjective: Aaliyah Reeder is a 65 y.o. female who presents for follow up of hypertension and hyperlipidemia. New concerns: none feels OK still working at [employer]. Occa diarrhea but doing OK, had colectomy in past for a perf. 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 More financial issues since son passed away. Current Outpatient Prescriptions   Medication Sig Dispense Refill    naproxen sodium (ALEVE) 220 mg tablet Take 220 mg by mouth two (2) times daily (with meals).  loperamide (IMMODIUM) 2 mg tablet Take 2 mg by mouth four (4) times daily as needed for Diarrhea.  traZODone (DESYREL) 50 mg tablet TAKE UP TO 3 PER NIGHT AS NEEDED BY MOUTH. 90 Tab 5    alendronate (FOSAMAX) 70 mg tablet Take 1 Tab by mouth every seven (7) days. 5 Tab 11    tolterodine ER (DETROL-LA) 2 mg ER capsule Take 1 Cap by mouth daily. 90 Cap 3    lisinopril (PRINIVIL, ZESTRIL) 5 mg tablet TAKE ONE TABLET BY MOUTH ONCE DAILY 90 Tab 2    raNITIdine (ZANTAC) 150 mg tablet TAKE ONE TABLET BY MOUTH TWICE DAILY FOR  ACID  REFLUX 60 Tab 11    Calcium-Cholecalciferol, D3, 500 mg(1,250mg) -400 unit tab Take  by mouth.  co-enzyme Q-10 (CO Q-10) 100 mg capsule Take 1 capsule by mouth daily. 90 capsule 1     Allergies   Allergen Reactions    Naprosyn [Naproxen] Nausea Only       Diet and Lifestyle: nonsmoker    Cardiovascular ROS: taking medications as instructed, no medication side effects noted, no TIA's, no chest pain on exertion, no dyspnea on exertion, no swelling of ankles. Review of Systems, additional:  Pertinent items are noted in HPI.   Knee does OK    Patient Active Problem List    Diagnosis Date Noted    CRI (chronic renal insufficiency) 05/15/2017    Essential hypertension 08/25/2016    Diverticulosis 05/08/2014    Osteoarthritis, knee 12/18/2012    Osteoporosis 12/13/2012    Pre-diabetes 12/13/2012    Hypertriglyceridemia 06/16/2011    Hyperlipidemia 05/11/2010     Current Outpatient Prescriptions   Medication Sig Dispense Refill    naproxen sodium (ALEVE) 220 mg tablet Take 220 mg by mouth two (2) times daily (with meals).  loperamide (IMMODIUM) 2 mg tablet Take 2 mg by mouth four (4) times daily as needed for Diarrhea.  traZODone (DESYREL) 50 mg tablet TAKE UP TO 3 PER NIGHT AS NEEDED BY MOUTH. 90 Tab 5    alendronate (FOSAMAX) 70 mg tablet Take 1 Tab by mouth every seven (7) days. 5 Tab 11    tolterodine ER (DETROL-LA) 2 mg ER capsule Take 1 Cap by mouth daily. 90 Cap 3    lisinopril (PRINIVIL, ZESTRIL) 5 mg tablet TAKE ONE TABLET BY MOUTH ONCE DAILY 90 Tab 2    raNITIdine (ZANTAC) 150 mg tablet TAKE ONE TABLET BY MOUTH TWICE DAILY FOR  ACID  REFLUX 60 Tab 11    Calcium-Cholecalciferol, D3, 500 mg(1,250mg) -400 unit tab Take  by mouth.  co-enzyme Q-10 (CO Q-10) 100 mg capsule Take 1 capsule by mouth daily.  90 capsule 1     Allergies   Allergen Reactions    Naprosyn [Naproxen] Nausea Only     Social History   Substance Use Topics    Smoking status: Former Smoker     Packs/day: 1.00     Years: 0.00     Quit date: 5/11/1990    Smokeless tobacco: Never Used    Alcohol use No        Lab Results  Component Value Date/Time   Cholesterol, total 232 05/15/2017 09:53 AM   HDL Cholesterol 50 05/15/2017 09:53 AM   LDL, calculated 151 05/15/2017 09:53 AM   Triglyceride 156 05/15/2017 09:53 AM     Lab Results  Component Value Date/Time   GFR est non-AA 54 05/15/2017 09:53 AM   GFR est AA 63 05/15/2017 09:53 AM   Creatinine 1.00 05/15/2017 09:53 AM   BUN 21 05/15/2017 09:53 AM   Sodium 140 05/15/2017 09:53 AM   Potassium 4.3 05/15/2017 09:53 AM   Chloride 102 05/15/2017 09:53 AM   CO2 24 05/15/2017 09:53 AM     Lab Results   Component Value Date/Time    Glucose 93 05/15/2017 09:53 AM    Glucose  04/04/2017 01:54 PM                     Objective:     Physical exam significant for the following:   Visit Vitals    /80    Pulse 70     Appearance: alert, well appearing, and in no distress. General exam: CVS exam BP noted to be well controlled today in office, S1, S2 normal, no gallop, no murmur, chest clear, no JVD, no HSM, no edema. .   Assessment/Plan:     hypertension stable      ICD-10-CM ICD-9-CM    1. Essential hypertension I10 401.9 ADMIN INFLUENZA VIRUS VAC   2. Hyperlipidemia, unspecified hyperlipidemia type E78.5 272.4 ADMIN INFLUENZA VIRUS VAC   3. Osteoporosis without current pathological fracture, unspecified osteoporosis type M81.0 733.00 ADMIN INFLUENZA VIRUS VAC   4. Diverticulosis of large intestine without hemorrhage K57.30 562.10 ADMIN INFLUENZA VIRUS VAC   5. Primary osteoarthritis of left knee M17.12 715.16 traZODone (DESYREL) 50 mg tablet      ADMIN INFLUENZA VIRUS VAC   6. Encounter for immunization Z23 V03.89 INFLUENZA VIRUS VACCINE, HIGH DOSE SEASONAL, PRESERVATIVE FREE      ADMIN INFLUENZA VIRUS VAC     . Orders Placed This Encounter    Influenza virus vaccine (FLUZONE HIGH-DOSE) 65 years and older (43747)    ADMIN INFLUENZA VIRUS VAC    naproxen sodium (ALEVE) 220 mg tablet     Sig: Take 220 mg by mouth two (2) times daily (with meals).  loperamide (IMMODIUM) 2 mg tablet     Sig: Take 2 mg by mouth four (4) times daily as needed for Diarrhea.  traZODone (DESYREL) 50 mg tablet     Sig: TAKE UP TO 3 PER NIGHT AS NEEDED BY MOUTH. Dispense:  90 Tab     Refill:  5     Chronic Conditions Addressed Today     1. Osteoporosis     Relevant Medications     naproxen sodium (ALEVE) 220 mg tablet     Other Relevant Orders     ADMIN INFLUENZA VIRUS VAC    2. Osteoarthritis, knee     Relevant Orders     ADMIN INFLUENZA VIRUS VAC    3. Hyperlipidemia     Relevant Orders     ADMIN INFLUENZA VIRUS VAC    4. Essential hypertension - Primary     Relevant Orders     ADMIN INFLUENZA VIRUS VAC    5.  Diverticulosis     Relevant Medications     loperamide (IMMODIUM) 2 mg tablet     Other Relevant Orders     ADMIN INFLUENZA VIRUS VAC      Acute Diagnoses Addressed Today     Encounter for immunization            Relevant Orders        INFLUENZA VIRUS VACCINE, HIGH DOSE SEASONAL, PRESERVATIVE FREE        ADMIN INFLUENZA VIRUS VAC        Follow-up Disposition:  Return in about 4 months (around 1/11/2018) for medicare annual.

## 2017-11-22 ENCOUNTER — OFFICE VISIT (OUTPATIENT)
Dept: INTERNAL MEDICINE CLINIC | Age: 77
End: 2017-11-22

## 2017-11-22 VITALS
RESPIRATION RATE: 20 BRPM | BODY MASS INDEX: 26.43 KG/M2 | TEMPERATURE: 97.6 F | OXYGEN SATURATION: 96 % | HEIGHT: 61 IN | DIASTOLIC BLOOD PRESSURE: 90 MMHG | HEART RATE: 80 BPM | SYSTOLIC BLOOD PRESSURE: 180 MMHG | WEIGHT: 140 LBS

## 2017-11-22 DIAGNOSIS — M81.0 OSTEOPOROSIS WITHOUT CURRENT PATHOLOGICAL FRACTURE, UNSPECIFIED OSTEOPOROSIS TYPE: ICD-10-CM

## 2017-11-22 DIAGNOSIS — S46.912A STRAIN OF LEFT SHOULDER, INITIAL ENCOUNTER: Primary | ICD-10-CM

## 2017-11-22 DIAGNOSIS — E78.5 HYPERLIPIDEMIA, UNSPECIFIED HYPERLIPIDEMIA TYPE: ICD-10-CM

## 2017-11-22 DIAGNOSIS — I10 ESSENTIAL HYPERTENSION: ICD-10-CM

## 2017-11-22 RX ORDER — HYDROCODONE BITARTRATE AND ACETAMINOPHEN 5; 325 MG/1; MG/1
1 TABLET ORAL
Qty: 12 TAB | Refills: 0 | Status: SHIPPED | OUTPATIENT
Start: 2017-11-22 | End: 2017-11-27 | Stop reason: SDUPTHER

## 2017-11-22 NOTE — PROGRESS NOTES
C/o injury to left shoulder - pain in shoulder radiating to elbow and into ribs X 1 week  Mary Guardado LPN  [phone]/[id number]  6:80 AM

## 2017-11-22 NOTE — LETTER
NOTIFICATION OF RETURN TO WORK / SCHOOL 
 
11/22/2017 9:05 AM 
 
Ms. Bela Rivera [phone] [place] To Whom It May Concern: 
 
Bela Rivera was under the care of [address]. She will be able to return to work on November 28, 2017 with no restrictions. If there are questions or concerns please have the patient contact our office. Sincerely, Tamika Valle MD

## 2017-11-22 NOTE — PROGRESS NOTES
HISTORY OF PRESENT ILLNESS  Chris Jenkins is a 65 y.o. female. Shoulder Pain    The history is provided by the patient. Incident onset: 1 week. The incident occurred at work. Injury mechanism: lifting merchandise at [employer] like turkeys. The left shoulder is affected. The pain is severe (at times). The pain has been constant since onset. There is no history of shoulder injury. There is a history of shoulder surgery (right one). She pts to the shoulder bicept and tricepts and elbow. Works at [employer] as a , thinks they are trying to push her out but she still has that she has to pay and feels like she cannot retire this point. Current Outpatient Prescriptions   Medication Sig Dispense Refill    naproxen sodium (ALEVE) 220 mg tablet Take 220 mg by mouth two (2) times daily (with meals).  loperamide (IMMODIUM) 2 mg tablet Take 2 mg by mouth four (4) times daily as needed for Diarrhea.  alendronate (FOSAMAX) 70 mg tablet Take 1 Tab by mouth every seven (7) days. 5 Tab 11    tolterodine ER (DETROL-LA) 2 mg ER capsule Take 1 Cap by mouth daily. 90 Cap 3    lisinopril (PRINIVIL, ZESTRIL) 5 mg tablet TAKE ONE TABLET BY MOUTH ONCE DAILY 90 Tab 2    raNITIdine (ZANTAC) 150 mg tablet TAKE ONE TABLET BY MOUTH TWICE DAILY FOR  ACID  REFLUX 60 Tab 11    Calcium-Cholecalciferol, D3, 500 mg(1,250mg) -400 unit tab Take  by mouth.  co-enzyme Q-10 (CO Q-10) 100 mg capsule Take 1 capsule by mouth daily.  90 capsule 1    traZODone (DESYREL) 50 mg tablet TAKE UP TO 3 PER NIGHT AS NEEDED BY MOUTH. 90 Tab 5     Allergies   Allergen Reactions    Naprosyn [Naproxen] Nausea Only     Social History     Social History    Marital status:      Spouse name: N/A    Number of children: 3    Years of education: N/A     Occupational History     [employer]     Social History Main Topics    Smoking status: Former Smoker     Packs/day: 1.00     Years: 0.00     Quit date: 5/11/1990    Smokeless tobacco: Never Used    Alcohol use No    Drug use: No    Sexual activity: No     Other Topics Concern    Not on file     Social History Narrative    Son lives with her, son passed away  2016 of an aneurysm     Only allowed to miss a certain amount of days before she is dismissed    Review of Systems   Respiratory: Negative for shortness of breath. Cardiovascular: Negative for chest pain. Musculoskeletal: Negative for back pain and falls. Physical Exam  Visit Vitals    /90 (BP 1 Location: Left arm, BP Patient Position: At rest)  Comment (BP 1 Location): manual    Pulse 80    Temp 97.6 °F (36.4 °C) (Oral)    Resp 20    Ht 5' 1\" (1.549 m)    Wt 140 lb (63.5 kg)    SpO2 96%    BMI 26.45 kg/m2     WD WN female NAD  Heart RRR without murmers clicks or rubs  Lungs CTA  Abdo soft nontender  Ext no edema  Left shoulder grossly nl, sl dec rom no pt tenderness impingement equivocal. No asymmetry. ASSESSMENT and PLAN      ICD-10-CM ICD-9-CM    1. Strain of left shoulder, initial encounter P24.303T 840.9    2. Essential hypertension I10 401.9    3. Hyperlipidemia, unspecified hyperlipidemia type E78.5 272.4    4. Osteoporosis without current pathological fracture, unspecified osteoporosis type M81.0 733.00        Orders Placed This Encounter    HYDROcodone-acetaminophen (NORCO) 5-325 mg per tablet       We discussed this pain and the usage of controlled substances for shoudler pain relief. In general these medications are indicated for the acute symptom relief and not for chronic usage, allthough they are frequently used for chronic management  After a certain period of time they should be stopped to avoid dependence and/or addiction. I warned her about the dangers of addiction and other side affects including respiratory depression and death. Discussed how this is a controlled substance and that the prescribing of it is should be monitored very carefully.  Drug usage is also monitored by our practise and the [facility], since there has been a lot of abuse and diversion of controlled substances. In general we do not refill this medication over the phone. PLease ask for enough pills to get you to your next appointment and make sure you keep your appointments. Warned not to take other medications like alcohol, other benzodiazapines marijuana or other narcotics when on this medication. Blood pressure up today probably from pain. Recheck upon follow-up. Discussed mirela swan current occupation at her age and how this might be the right job for her at this age with her health comorbidities. She will rest up, no given for work. If continued pain consider steroid injection and/or orthopedic referral.  Follow-up Disposition:  Return in about 1 week (around 11/29/2017) for routine follow up.

## 2017-11-22 NOTE — MR AVS SNAPSHOT
Visit Information Date & Time Provider Department Dept. Phone Encounter #  
 11/22/2017  8:45 AM Laurie Liu MD [facility] [place] [id number] Follow-up Instructions Return in about 1 week (around 11/29/2017) for routine follow up. Your Appointments 1/8/2018  9:15 AM  
ACUTE CARE with Laurie Liu MD  
[address] Appt Note: f/u on dm $0 cp lsh 9/11/17  
 [address] [place] [address]  
[phone]  
  
   
 [address] Upcoming Health Maintenance Date Due  
 GLAUCOMA SCREENING Q2Y 11/1/2015 MEDICARE YEARLY EXAM 12/3/2017 DTaP/Tdap/Td series (2 - Td) 2/6/2027 Allergies as of 11/22/2017  Review Complete On: 11/22/2017 By: Laurie Liu MD  
  
 Severity Noted Reaction Type Reactions Naprosyn [Naproxen]  05/18/2015    Nausea Only Current Immunizations  Reviewed on 2/6/2017 Name Date H1N1 FLU VACCINE 3/24/2010 Influenza High Dose Vaccine PF 9/11/2017 Influenza Vaccine 8/20/2014, 9/11/2013 Influenza Vaccine Roselynn Shaen) 10/1/2015 Influenza Vaccine (Quad) PF 8/25/2016 Influenza Vaccine Split 9/13/2012, 10/6/2011 Influenza Vaccine Whole 10/7/2009 Pneumococcal Conjugate (PCV-13) 9/30/2016 Tdap 2/6/2017 ZZZ-RETIRED (DO NOT USE) Pneumococcal Vaccine (Unspecified Type) 4/28/2010 Zoster Vaccine, Live 4/8/2015 Not reviewed this visit You Were Diagnosed With   
  
 Codes Comments Strain of left shoulder, initial encounter    -  Primary ICD-10-CM: H91.346X ICD-9-CM: 840.9 Essential hypertension     ICD-10-CM: I10 
ICD-9-CM: 401.9 Vitals BP Pulse Temp Resp Height(growth percentile) Weight(growth percentile) 180/90 (BP 1 Location: Left arm, BP Patient Position: At rest) 80 97.6 °F (36.4 °C) (Oral) 20 5' 1\" (1.549 m) 140 lb (63.5 kg) SpO2 BMI OB Status Smoking Status 96% 26.45 kg/m2 Hysterectomy Former Smoker Vitals History BMI and BSA Data Body Mass Index Body Surface Area  
 26.45 kg/m 2 1.65 m 2 Preferred Pharmacy Pharmacy Name [facility] [address]on [phone] Your Updated Medication List  
  
   
This list is accurate as of: 11/22/17  9:03 AM.  Always use your most recent med list.  
  
  
  
  
 alendronate 70 mg tablet Commonly known as:  FOSAMAX Take 1 Tab by mouth every seven (7) days. ALEVE 220 mg tablet Generic drug:  naproxen sodium Take 220 mg by mouth two (2) times daily (with meals). Calcium-Cholecalciferol (D3) 500 mg(1,250mg) -400 unit Tab Take  by mouth. co-enzyme Q-10 100 mg capsule Commonly known as:  CO Q-10 Take 1 capsule by mouth daily. HYDROcodone-acetaminophen 5-325 mg per tablet Commonly known as:  Blas Punt Take 1 Tab by mouth every six (6) hours as needed for Pain. Max Daily Amount: 4 Tabs. lisinopril 5 mg tablet Commonly known as:  PRINIVIL, ZESTRIL  
TAKE ONE TABLET BY MOUTH ONCE DAILY  
  
 loperamide 2 mg tablet Commonly known as:  IMMODIUM Take 2 mg by mouth four (4) times daily as needed for Diarrhea. raNITIdine 150 mg tablet Commonly known as:  ZANTAC TAKE ONE TABLET BY MOUTH TWICE DAILY FOR  ACID  REFLUX  
  
 tolterodine ER 2 mg ER capsule Commonly known as:  DETROL-LA Take 1 Cap by mouth daily. traZODone 50 mg tablet Commonly known as:  DESYREL  
TAKE UP TO 3 PER NIGHT AS NEEDED BY MOUTH. Prescriptions Printed Refills HYDROcodone-acetaminophen (NORCO) 5-325 mg per tablet 0 Sig: Take 1 Tab by mouth every six (6) hours as needed for Pain. Max Daily Amount: 4 Tabs. Class: Print Route: Oral  
  
Follow-up Instructions Return in about 1 week (around 11/29/2017) for routine follow up. Introducing [facility]! Dear Chad Gibbs: Thank you for requesting a [employer] account. Our records indicate that you have previously registered for a [employer] account but its currently inactive. Please call our [employer] support line at [phone]. Additional Information If you have questions, please visit the Frequently Asked Questions section of the [employer] website at [email or web address] Remember, [employer] is NOT to be used for urgent needs. For medical emergencies, dial 911. Now available from your iPhone and Android! Please provide this summary of care documentation to your next provider. Your primary care clinician is listed as Morenita Szymanski. If you have any questions after today's visit, please call [phone].

## 2017-11-27 ENCOUNTER — OFFICE VISIT (OUTPATIENT)
Dept: INTERNAL MEDICINE CLINIC | Age: 77
End: 2017-11-27

## 2017-11-27 VITALS
RESPIRATION RATE: 16 BRPM | BODY MASS INDEX: 26.62 KG/M2 | WEIGHT: 141 LBS | TEMPERATURE: 96 F | DIASTOLIC BLOOD PRESSURE: 71 MMHG | HEART RATE: 75 BPM | HEIGHT: 61 IN | SYSTOLIC BLOOD PRESSURE: 142 MMHG | OXYGEN SATURATION: 98 %

## 2017-11-27 DIAGNOSIS — I10 ESSENTIAL HYPERTENSION: ICD-10-CM

## 2017-11-27 DIAGNOSIS — M25.512 ACUTE PAIN OF LEFT SHOULDER: Primary | ICD-10-CM

## 2017-11-27 RX ORDER — HYDROCODONE BITARTRATE AND ACETAMINOPHEN 5; 325 MG/1; MG/1
1 TABLET ORAL
Qty: 12 TAB | Refills: 0 | Status: SHIPPED | OUTPATIENT
Start: 2017-11-27 | End: 2018-01-08 | Stop reason: ALTCHOICE

## 2017-11-27 NOTE — PROGRESS NOTES
HISTORY OF PRESENT ILLNESS  Alejandra Tomlinson is a 65 y.o. female. Shoulder Pain    The history is provided by the patient. The incident occurred more than 1 week ago. The incident occurred at work. Injury mechanism: lifting lots of groceries. The left shoulder is affected. The pain is severe (tp moderate). There is no history of shoulder injury. There is no history of shoulder surgery. Review of Systems   Musculoskeletal: Positive for joint pain. Negative for falls. Physical Exam  Visit Vitals    /71 (BP 1 Location: Left arm, BP Patient Position: Sitting)    Pulse 75    Temp 96 °F (35.6 °C) (Oral)    Resp 16    Ht 5' 1\" (1.549 m)    Wt 141 lb (64 kg)    SpO2 98%    BMI 26.64 kg/m2     WD WN female NAD  Heart RRR without murmers clicks or rubs  Lungs CTA  Abdo soft nontender  Ext no edema  Left shoulder eqivocal impingment signs no obvious assymetry, no pt tenderness    ASSESSMENT and PLAN  Encounter Diagnoses   Name Primary?  Acute pain of left shoulder Yes    Essential hypertension      Orders Placed This Encounter    DRAIN/INJECT LARGE JOINT/BURSA    HYDROcodone-acetaminophen (NORCO) 5-325 mg per tablet   Last narcotics prescription for this problem. Options discussed. Discussed possible side affects and benefits of the procedure and/or medications. The patient agrees to undergo the procedure. Consent obtained. Sterile procedure followed. There were no complications and the procedure was well tolerated. Instructed patient to call me if it is ineffective or if there are any complications like increasing pain, redness or swelling. The left shoulder was assessed and landmarks palpated and marked. The shoulder was prepped with alcohol. Syringe was prepared with 2 cc of kenalog and 7cc of lidocaine. The shoulder was injected using a posterior approach.  Patient was notified of signs to look for in terms of post injection complications, like increasing redness or swelling or pain.    Note given for work out x 2 more weeks. Hypertension stable. Follow-up Disposition:  Return in about 3 weeks (around 12/18/2017) for routine follow up. Chronic Conditions Addressed Today     1.  Essential hypertension     Relevant Orders     TRIAMCINOLONE ACETONIDE INJ      Acute Diagnoses Addressed Today     Acute pain of left shoulder    -  Primary        Relevant Orders        DRAIN/INJECT LARGE JOINT/BURSA        TRIAMCINOLONE ACETONIDE INJ

## 2017-11-27 NOTE — LETTER
NOTIFICATION OF RETURN TO WORK / SCHOOL 
 
11/27/2017 Ms. Jeff Cartwright [address] [place] [address] To Whom It May Concern: 
 
Jeff Cartwright was under the care of [address]. She will be able to return to work on 12/11/17. If there are questions or concerns please have the patient contact our office. Sincerely, Shannon Beatty MD

## 2017-11-27 NOTE — PROGRESS NOTES
Follow up for left shoulder pain - finished pain medication this morning  Shea Allan LPN  16/14/4160  7:70 AM

## 2017-11-27 NOTE — MR AVS SNAPSHOT
Visit Information Date & Time Provider Department Dept. Phone Encounter #  
 11/27/2017  9:00 AM MD Mayelin Aden [phone] [id number] Follow-up Instructions Return in about 3 weeks (around 12/18/2017) for routine follow up. Your Appointments 12/1/2017  8:45 AM  
ROUTINE CARE with MD Mayelin Aden 380 ([facility]) Appt Note: f/u on bp $0 cp [facility] 11/22/17  
 [address] [place] [address]. [address] [place] [address]  
[phone]  
  
   
 [address] [facility] [address] [facility]  
  
    
 1/8/2018  9:15 AM  
ACUTE CARE with MD Mayelin Aden 380 [facility] Appt Note: f/u on dm $0 cp [facility] 9/11/17  
 [address] [place] [address] Road. [address] [place] [address]  
[phone]  
  
   
 [address] [place] [address] [address] Upcoming Health Maintenance Date Due  
 GLAUCOMA SCREENING Q2Y 11/1/2015 MEDICARE YEARLY EXAM 12/3/2017 DTaP/Tdap/Td series (2 - Td) 2/6/2027 Allergies as of 11/27/2017  Review Complete On: 11/27/2017 By: Aldair Winters MD  
  
 Severity Noted Reaction Type Reactions Naprosyn [Naproxen]  05/18/2015    Nausea Only Current Immunizations  Reviewed on 2/6/2017 Name Date H1N1 FLU VACCINE 3/24/2010 Influenza High Dose Vaccine PF 9/11/2017 Influenza Vaccine 8/20/2014, 9/11/2013 Influenza Vaccine Melford Going) 10/1/2015 Influenza Vaccine (Quad) PF 8/25/2016 Influenza Vaccine Split 9/13/2012, 10/6/2011 Influenza Vaccine Whole 10/7/2009 Pneumococcal Conjugate (PCV-13) 9/30/2016 Tdap 2/6/2017 ZZZ-RETIRED (DO NOT USE) Pneumococcal Vaccine (Unspecified Type) 4/28/2010 Zoster Vaccine, Live 4/8/2015 Not reviewed this visit You Were Diagnosed With   
  
 Codes Comments Acute pain of left shoulder    -  Primary ICD-10-CM: M25.512 ICD-9-CM: 719.41   
 Essential hypertension     ICD-10-CM: I10 
ICD-9-CM: 401.9 Vitals BP Pulse Temp Resp Height(growth percentile) Weight(growth percentile) 142/71 (BP 1 Location: Left arm, BP Patient Position: Sitting) 75 96 °F (35.6 °C) (Oral) 16 5' 1\" (1.549 m) 141 lb (64 kg) SpO2 BMI OB Status Smoking Status 98% 26.64 kg/m2 Hysterectomy Former Smoker Vitals History BMI and BSA Data Body Mass Index Body Surface Area  
 26.64 kg/m 2 1.66 m 2 Preferred Pharmacy Pharmacy Name Phone [facility] [address] [facility], [address] [facility] [phone] Your Updated Medication List  
  
   
This list is accurate as of: 11/27/17  9:39 AM.  Always use your most recent med list.  
  
  
  
  
 alendronate 70 mg tablet Commonly known as:  FOSAMAX Take 1 Tab by mouth every seven (7) days. ALEVE 220 mg tablet Generic drug:  naproxen sodium Take 220 mg by mouth two (2) times daily (with meals). Calcium-Cholecalciferol (D3) 500 mg(1,250mg) -400 unit Tab Take  by mouth. co-enzyme Q-10 100 mg capsule Commonly known as:  CO Q-10 Take 1 capsule by mouth daily. HYDROcodone-acetaminophen 5-325 mg per tablet Commonly known as:  Aubrey Handler Take 1 Tab by mouth every six (6) hours as needed for Pain. Max Daily Amount: 4 Tabs. lisinopril 5 mg tablet Commonly known as:  PRINIVIL, ZESTRIL  
TAKE ONE TABLET BY MOUTH ONCE DAILY  
  
 loperamide 2 mg tablet Commonly known as:  IMMODIUM Take 2 mg by mouth four (4) times daily as needed for Diarrhea. raNITIdine 150 mg tablet Commonly known as:  ZANTAC TAKE ONE TABLET BY MOUTH TWICE DAILY FOR  ACID  REFLUX  
  
 tolterodine ER 2 mg ER capsule Commonly known as:  DETROL-LA Take 1 Cap by mouth daily. traZODone 50 mg tablet Commonly known as:  DESYREL  
TAKE UP TO 3 PER NIGHT AS NEEDED BY MOUTH. Prescriptions Printed Refills HYDROcodone-acetaminophen (NORCO) 5-325 mg per tablet 0 Sig: Take 1 Tab by mouth every six (6) hours as needed for Pain. Max Daily Amount: 4 Tabs. Class: Print Route: Oral  
  
We Performed the Following DRAIN/INJECT LARGE JOINT/BURSA [id number] CPT(R)] Follow-up Instructions Return in about 3 weeks (around 12/18/2017) for routine follow up. Patient Instructions Joint Injections: Care Instructions Your Care Instructions Joint injections are shots into a joint, such as the knee. They may be used to put in medicines, such as pain relievers. Or they can be used to take out fluid. Sometimes the fluid is tested in a lab. This can help find the cause of a joint problem. A corticosteroid, or steroid, shot is used to reduce inflammation in tendons or joints. It is often used to treat problems such as arthritis, tendinitis, and bursitis. Steroids can be injected directly into a painful, inflamed joint. They can also help reduce inflammation of a bursa. A bursa is a sac of fluid. It cushions and lubricates areas where tendons, ligaments, skin, muscles, or bones rub against each other. A steroid shot can sometimes help with short-term pain relief when other treatments haven't worked. If steroid shots help, pain may improve for weeks or months. Follow-up care is a key part of your treatment and safety. Be sure to make and go to all appointments, and call your doctor if you are having problems. It's also a good idea to know your test results and keep a list of the medicines you take. How can you care for yourself at home? · Put ice or a cold pack on the area for 10 to 20 minutes at a time. Put a thin cloth between the ice and your skin. · Take anti-inflammatory medicines to reduce pain, swelling, or inflammation. These include ibuprofen (Advil, Motrin) and naproxen (Aleve). Read and follow all instructions on the label. · Avoid strenuous activities for several days, especially those that put stress on the area where you got the shot. · If you have dressings over the area, keep them clean and dry. You may remove them when your doctor tells you to. When should you call for help? Call your doctor now or seek immediate medical care if: 
? · You have signs of infection, such as: 
¨ Increased pain, swelling, warmth, or redness. ¨ Red streaks leading from the site. ¨ Pus draining from the site. ¨ A fever. ? Watch closely for changes in your health, and be sure to contact your doctor if you have any problems. Where can you learn more? Go to http://radha-zunilda.info/. Enter N616 in the search box to learn more about \"Joint Injections: Care Instructions. \" Current as of: March 21, 2017 Content Version: 11.4 © 7429-1040 [employer]. Care instructions adapted under license by [employer] (which disclaims liability or warranty for this information). If you have questions about a medical condition or this instruction, always ask your healthcare professional. [address] any warranty or liability for your use of this information. Introducing [facility] & HE[facility]! Dear Rio Patterson: Thank you for requesting a [employer] account. Our records indicate that you have previously registered for a [employer] account but its currently inactive. Please call our [employer] support line at [phone]. Additional Information If you have questions, please visit the Frequently Asked Questions section of the [employer] website at [email or web address] Remember, [employer] is NOT to be used for urgent needs. For medical emergencies, dial 911. Now available from your iPhone and Android! Please provide this summary of care documentation to your next provider. Your primary care clinician is listed as Kaela Goldstein.  If you have any questions after today's visit, please call [phone].

## 2017-11-27 NOTE — PROGRESS NOTES
Chief Complaint   Patient presents with    Shoulder Pain     left shoulder and upper arm - finished pain medication     I have reviewed the patient's medical history in detail and updated the computerized patient record. Health Maintenance reviewed. 1. Have you been to the ER, urgent care clinic since your last visit? Hospitalized since your last visit?no    2. Have you seen or consulted any other health care providers outside of the [address] [place] [address] since your last visit? Include any pap smears or colon screening.  no    Encouraged pt to discuss pt's wishes with spouse/partner/family and bring them in the next appt to follow thru with the Advanced Directive    [address]  OFFICE PROCEDURE PROGRESS NOTE        Chart reviewed for the following:   Silvestre Almonte LPN, have reviewed the History, Physical and updated the Allergic reactions for [address] [place] [address] performed immediately prior to start of procedure:   Silvestre Almonte LPN, have [facility] the [facility] to the start of the procedure:            * Patient was identified by name and date of birth   * Agreement on procedure being performed was verified  * Risks and Benefits explained to the patient by Dr. Aldair Winters  * Procedure site verified and marked as necessary  * Patient was positioned for comfort  * Consent was signed and verified     Time: 9:45AM      Date of procedure: 11/27/2017    Procedure performed by:  Aldair Winters MD    Provider assisted by: Haim Nice LPN    Patient assisted by: Haim Nice LPN    How tolerated by patient: Well     Post Procedural Pain Scale: 5/10    Comments: None

## 2017-11-27 NOTE — PATIENT INSTRUCTIONS
Joint Injections: Care Instructions  Your Care Instructions  Joint injections are shots into a joint, such as the knee. They may be used to put in medicines, such as pain relievers. Or they can be used to take out fluid. Sometimes the fluid is tested in a lab. This can help find the cause of a joint problem. A corticosteroid, or steroid, shot is used to reduce inflammation in tendons or joints. It is often used to treat problems such as arthritis, tendinitis, and bursitis. Steroids can be injected directly into a painful, inflamed joint. They can also help reduce inflammation of a bursa. A bursa is a sac of fluid. It cushions and lubricates areas where tendons, ligaments, skin, muscles, or bones rub against each other. A steroid shot can sometimes help with short-term pain relief when other treatments haven't worked. If steroid shots help, pain may improve for weeks or months. Follow-up care is a key part of your treatment and safety. Be sure to make and go to all appointments, and call your doctor if you are having problems. It's also a good idea to know your test results and keep a list of the medicines you take. How can you care for yourself at home? · Put ice or a cold pack on the area for 10 to 20 minutes at a time. Put a thin cloth between the ice and your skin. · Take anti-inflammatory medicines to reduce pain, swelling, or inflammation. These include ibuprofen (Advil, Motrin) and naproxen (Aleve). Read and follow all instructions on the label. · Avoid strenuous activities for several days, especially those that put stress on the area where you got the shot. · If you have dressings over the area, keep them clean and dry. You may remove them when your doctor tells you to. When should you call for help? Call your doctor now or seek immediate medical care if:  ? · You have signs of infection, such as:  ¨ Increased pain, swelling, warmth, or redness. ¨ Red streaks leading from the site.   ¨ Pus draining from the site. ¨ A fever. ? Watch closely for changes in your health, and be sure to contact your doctor if you have any problems. Where can you learn more? Go to http://radha-zunilda.info/. Enter N616 in the search box to learn more about \"Joint Injections: Care Instructions. \"  Current as of: March 21, 2017  Content Version: 11.4  © 3028-7213 [employer]. Care instructions adapted under license by [email or web address] (which disclaims liability or warranty for this information). If you have questions about a medical condition or this instruction, always ask your healthcare professional. [address] any warranty or liability for your use of this information.

## 2017-11-28 ENCOUNTER — TELEPHONE (OUTPATIENT)
Dept: INTERNAL MEDICINE CLINIC | Age: 77
End: 2017-11-28

## 2017-11-28 NOTE — TELEPHONE ENCOUNTER
Called patient to check on her - she states that she took her blood pressure at 1pm at it read 155/98 - took her Lisinopril 5mg at 630 this am - denies any headaches or dizziness or other symptoms, just feels cold, cannot seem to get warmed up - left shoulder pain is much better today - so she will not take anymore tod the Norco for pain, she will try to use only ALeve, twice per day, and will notify us if this does not help - Dr Mindi Caldera made aware  Mayelin Carlson, [place]  62/21/2062  0:31 PM

## 2017-12-05 ENCOUNTER — TELEPHONE (OUTPATIENT)
Dept: INTERNAL MEDICINE CLINIC | Age: 77
End: 2017-12-05

## 2017-12-05 NOTE — LETTER
NOTIFICATION RETURN TO WORK / SCHOOL 
 
12/5/2017 3:28 PM 
 
Ms. Gladys Mckenzie [address] [address] To Whom It May Concern: 
 
 
Gladys Mckenzie is currently under the care of [address] She will return to work/school on: 12/9/2017 If there are questions or concerns please have the patient contact our office. Sincerely, Sudhakar Chapman MD

## 2017-12-05 NOTE — TELEPHONE ENCOUNTER
Pt would like to know if she can get a work excuse to go back to work on Saturday. She would like to pick it up tomorrow.

## 2017-12-05 NOTE — TELEPHONE ENCOUNTER
Per verbal order of Dr Gordon chen to send patient back to work on Saturday 12/9/17 - letter prepped and patient to  - notified patient  Melina Suggs, [place]  16/1/7668  5:01 PM

## 2017-12-07 ENCOUNTER — CLINICAL SUPPORT (OUTPATIENT)
Dept: INTERNAL MEDICINE CLINIC | Age: 77
End: 2017-12-07

## 2017-12-07 VITALS
TEMPERATURE: 98.3 F | HEART RATE: 94 BPM | RESPIRATION RATE: 16 BRPM | DIASTOLIC BLOOD PRESSURE: 72 MMHG | SYSTOLIC BLOOD PRESSURE: 131 MMHG

## 2017-12-07 DIAGNOSIS — I10 ESSENTIAL HYPERTENSION: Primary | ICD-10-CM

## 2017-12-07 NOTE — PROGRESS NOTES
Chief Complaint   Patient presents with    Blood Pressure Check     As per Dr. Juvenal Mckeon, pt's VS are fine.

## 2017-12-14 RX ORDER — LISINOPRIL 5 MG/1
TABLET ORAL
Qty: 90 TAB | Refills: 1 | Status: SHIPPED | OUTPATIENT
Start: 2017-12-14 | End: 2018-03-22 | Stop reason: SDUPTHER

## 2017-12-14 NOTE — TELEPHONE ENCOUNTER
Last office visit:  11/27/17  Last filled:  Lisinopril 5mg 4/25/17 #90 X 2 refills  No changes  Follow 1/8/18 with Dr Girma Chand

## 2018-01-08 ENCOUNTER — OFFICE VISIT (OUTPATIENT)
Dept: INTERNAL MEDICINE CLINIC | Age: 78
End: 2018-01-08

## 2018-01-08 VITALS
SYSTOLIC BLOOD PRESSURE: 128 MMHG | WEIGHT: 137 LBS | HEART RATE: 74 BPM | RESPIRATION RATE: 16 BRPM | HEIGHT: 61 IN | OXYGEN SATURATION: 98 % | DIASTOLIC BLOOD PRESSURE: 68 MMHG | TEMPERATURE: 97.3 F | BODY MASS INDEX: 25.86 KG/M2

## 2018-01-08 DIAGNOSIS — Z13.31 SCREENING FOR DEPRESSION: ICD-10-CM

## 2018-01-08 DIAGNOSIS — M81.0 AGE RELATED OSTEOPOROSIS, UNSPECIFIED PATHOLOGICAL FRACTURE PRESENCE: ICD-10-CM

## 2018-01-08 DIAGNOSIS — Z78.9 ADVANCE DIRECTIVE IN CHART: ICD-10-CM

## 2018-01-08 DIAGNOSIS — Z00.00 MEDICARE ANNUAL WELLNESS VISIT, SUBSEQUENT: Primary | ICD-10-CM

## 2018-01-08 DIAGNOSIS — Z71.89 COUNSELING REGARDING ADVANCED DIRECTIVES: ICD-10-CM

## 2018-01-08 DIAGNOSIS — Z13.39 SCREENING FOR ALCOHOLISM: ICD-10-CM

## 2018-01-08 DIAGNOSIS — I10 ESSENTIAL HYPERTENSION: ICD-10-CM

## 2018-01-08 DIAGNOSIS — E78.5 HYPERLIPIDEMIA, UNSPECIFIED HYPERLIPIDEMIA TYPE: ICD-10-CM

## 2018-01-08 NOTE — MR AVS SNAPSHOT
Visit Information Date & Time Provider Department Dept. Phone Encounter #  
 1/8/2018  9:15 AM Madi Zendejas  [place]  [id number] Follow-up Instructions Return in about 4 months (around 5/8/2018). Upcoming Health Maintenance Date Due  
 MEDICARE YEARLY EXAM 12/3/2017 GLAUCOMA SCREENING Q2Y 6/9/2019 DTaP/Tdap/Td series (2 - Td) 2/6/2027 Allergies as of 1/8/2018  Review Complete On: 1/8/2018 By: Madi Zendejas MD  
  
 Severity Noted Reaction Type Reactions Naprosyn [Naproxen]  05/18/2015    Nausea Only Current Immunizations  Reviewed on 2/6/2017 Name Date H1N1 FLU VACCINE 3/24/2010 Influenza High Dose Vaccine PF 9/11/2017 Influenza Vaccine 8/20/2014, 9/11/2013 Influenza Vaccine Shawn Blend) 10/1/2015 Influenza Vaccine (Quad) PF 8/25/2016 Influenza Vaccine Split 9/13/2012, 10/6/2011 Influenza Vaccine Whole 10/7/2009 Pneumococcal Conjugate (PCV-13) 9/30/2016 Tdap 2/6/2017 ZZZ-RETIRED (DO NOT USE) Pneumococcal Vaccine (Unspecified Type) 4/28/2010 Zoster Vaccine, Live 4/8/2015 Not reviewed this visit You Were Diagnosed With   
  
 Codes Comments Medicare annual wellness visit, subsequent    -  Primary ICD-10-CM: Z00.00 ICD-9-CM: V70.0 Essential hypertension     ICD-10-CM: I10 
ICD-9-CM: 401.9 Hyperlipidemia, unspecified hyperlipidemia type     ICD-10-CM: E78.5 ICD-9-CM: 272.4 Screening for alcoholism     ICD-10-CM: Z13.89 ICD-9-CM: V79.1 Screening for depression     ICD-10-CM: Z13.89 ICD-9-CM: V79.0 Advance directive in chart     ICD-10-CM: Z78.9 ICD-9-CM: V49.89 Vitals BP Pulse Temp Resp Height(growth percentile) Weight(growth percentile) 128/68 74 97.3 °F (36.3 °C) (Oral) 16 5' 1\" (1.549 m) 137 lb (62.1 kg) SpO2 BMI OB Status Smoking Status 98% 25.89 kg/m2 Hysterectomy Former Smoker Vitals History BMI and BSA Data Body Mass Index Body Surface Area  
 25.89 kg/m 2 1.63 m 2 Preferred Pharmacy Pharmacy Name Phone [facility] [phone] [facility] [phone] Your Updated Medication List  
  
   
This list is accurate as of: 1/8/18 11:00 AM.  Always use your most recent med list.  
  
  
  
  
 alendronate 70 mg tablet Commonly known as:  FOSAMAX Take 1 Tab by mouth every seven (7) days. ALEVE 220 mg tablet Generic drug:  naproxen sodium Take 220 mg by mouth two (2) times daily (with meals). Calcium-Cholecalciferol (D3) 500 mg(1,250mg) -400 unit Tab Take  by mouth. co-enzyme Q-10 100 mg capsule Commonly known as:  CO Q-10 Take 1 capsule by mouth daily. lisinopril 5 mg tablet Commonly known as:  PRINIVIL, ZESTRIL  
TAKE ONE TABLET BY MOUTH ONCE DAILY  
  
 loperamide 2 mg tablet Commonly known as:  IMMODIUM Take 2 mg by mouth four (4) times daily as needed for Diarrhea. raNITIdine 150 mg tablet Commonly known as:  ZANTAC TAKE ONE TABLET BY MOUTH TWICE DAILY FOR  ACID  REFLUX  
  
 tolterodine ER 2 mg ER capsule Commonly known as:  DETROL-LA Take 1 Cap by mouth daily. traZODone 50 mg tablet Commonly known as:  DESYREL  
TAKE UP TO 3 PER NIGHT AS NEEDED BY MOUTH. We Performed the Following [address] [[id number] [facility]] LIPID PANEL [83304 CPT(R)] METABOLIC PANEL, BASIC [53012 CPT(R)] IA ANNUAL ALCOHOL SCREEN 15 MIN [id number] [facility]] Follow-up Instructions Return in about 4 months (around 5/8/2018). Patient Instructions Medicare Wellness Visit, Female The best way to live healthy is to have a healthy lifestyle by eating a well-balanced diet, exercising regularly, limiting alcohol and stopping smoking. Regular physical exams and screening tests are another way to keep healthy.  Preventive exams provided by your health care provider can find health problems before they become diseases or illnesses. Preventive services including immunizations, screening tests, monitoring and exams can help you take care of your own health. All people over age 72 should have a pneumovax  and and a prevnar shot to prevent pneumonia. These are once in a lifetime unless you and your provider decide differently. All people over 65 should have a yearly flu shot and a tetanus vaccine every 10 years. A bone mass density to screen for osteoporosis or thinning of the bones should be done every 2 years after 65. Screening for diabetes mellitus with a blood sugar test should be done every year. Glaucoma is a disease of the eye due to increased ocular pressure that can lead to blindness and it should be done every year by an eye professional. 
 
Cardiovascular screening tests that check for elevated lipids (fatty part of blood) which can lead to heart disease and strokes should be done every 5 years. Colorectal screening that evaluates for blood or polyps in your colon should be done yearly as a stool test or every five years as a flexible sigmoidoscope or every 10 years as a colonoscopy up to age 76. Breast cancer screening with a mammogram is recommended biennially  for women age 54-69. Screening for cervical cancer with a pap smear and pelvic exam is recommended for women after age 72 years every 2 years up to age 79 or when the provider and patient decide to stop. If there is a history of cervical abnormalities or other increased risk for cancer then the test is recommended yearly. Hepatitis C screening is also recommended for anyone born between [birth year] through [address]. A shingles vaccine is also recommended once in a lifetime after age 61. Your Medicare Wellness Exam is recommended annually. Here is a list of your current Health Maintenance items with a due date: 
Health Maintenance Due Topic Date Due  
 Annual Well Visit  12/03/2017 Mirella Montgomery [address] What is a living will? A living will is a legal form you use to write down the kind of care you want at the end of your life. It is used by the health professionals who will treat you if you aren't able to decide for yourself. If you put your wishes in writing, your loved ones and others will know what kind of care you want. They won't need to guess. This can ease your mind and be helpful to others. A living will is not the same as an estate or property will. An estate will explains what you want to happen with your money and property after you die. Is a living will a legal document? A living will is a legal document. Each state has its own laws about living truj[place]. If you move to another state, make sure that your living will is legal in the state where you now live. Or you might use a universal form that has been approved by many states. This kind of form can sometimes be completed and stored online. Your electronic copy will then be available wherever you have a connection to the Internet. In most cases, doctors will respect your wishes even if you have a form from a different state. · You don't need an  to complete a living will. But legal advice can be helpful if your state's laws are unclear, your health history is complicated, or your family can't agree on what should be in your living will. · You can change your living will at any time. Some people find that their wishes about end-of-life care change as their health changes. · In addition to making a living will, think about completing a medical power of  form. This form lets you name the person you want to make end-of-life treatment decisions for you (your \"health care agent\") if you're not able to. Many hospitals and nursing homes will give you the forms you need to complete a living will and a medical power of .  
· Your living will is used only if you can't make or communicate decisions for yourself anymore. If you become able to make decisions again, you can accept or refuse any treatment, no matter what you wrote in your living will. · Your state may offer an online registry. This is a place where you can store your living will online so the doctors and nurses who need to treat you can find it right away. What should you think about when creating a living will? Talk about your end-of-life wishes with your family members and your doctor. Let them know what you want. That way the people making decisions for you won't be surprised by your choices. Think about these questions as you make your living will: · Do you know enough about life support methods that might be used? If not, talk to your doctor so you know what might be done if you can't breathe on your own, your heart stops, or you're unable to swallow. · What things would you still want to be able to do after you receive life-support methods? Would you want to be able to walk? To speak? To eat on your own? To live without the help of machines? · If you have a choice, where do you want to be cared for? In your home? At a hospital or nursing home? · Do you want certain Mosque practices performed if you become very ill? · If you have a choice at the end of your life, where would you prefer to die? At home? In a hospital or nursing home? Somewhere else? · Would you prefer to be buried or cremated? · Do you want your organs to be donated after you die? What should you do with your living will? · Make sure that your family members and your health care agent have copies of your living will. · Give your doctor a copy of your living will to keep in your medical record. If you have more than one doctor, make sure that each one has a copy. · You may want to put a copy of your living will where it can be easily found. Where can you learn more? Go to http://radha-zunilda.info/. Enter U358 in the search box to learn more about \"Learning About Living [place]. \" Current as of: September 24, 2016 Content Version: 11.4 © 2246-7932 [employer]wise, [employer]. Care instructions adapted under license by [employer] (which disclaims liability or warranty for this information). If you have questions about a medical condition or this instruction, always ask your healthcare professional. [address] any warranty or liability for your use of this information. Introducing [address]! Dear Zheng Aleman: Thank you for requesting a [employer] account. Our records indicate that you have previously registered for a [employer] account but its currently inactive. Please call our [employer] support line at [phone]. Additional Information If you have questions, please visit the Frequently Asked Questions section of the [employer] website at [email or web address] Remember, Cart[employer] is NOT to be used for urgent needs. For medical emergencies, dial 911. Now available from your iPhone and Android! Please provide this summary of care documentation to your next provider. Your primary care clinician is listed as Nonnie Bolus. If you have any questions after today's visit, please call [phone].

## 2018-01-08 NOTE — PATIENT INSTRUCTIONS
Medicare Wellness Visit, Female    The best way to live healthy is to have a healthy lifestyle by eating a well-balanced diet, exercising regularly, limiting alcohol and stopping smoking. Regular physical exams and screening tests are another way to keep healthy. Preventive exams provided by your health care provider can find health problems before they become diseases or illnesses. Preventive services including immunizations, screening tests, monitoring and exams can help you take care of your own health. All people over age 72 should have a pneumovax  and and a prevnar shot to prevent pneumonia. These are once in a lifetime unless you and your provider decide differently. All people over 65 should have a yearly flu shot and a tetanus vaccine every 10 years. A bone mass density to screen for osteoporosis or thinning of the bones should be done every 2 years after 65. Screening for diabetes mellitus with a blood sugar test should be done every year. Glaucoma is a disease of the eye due to increased ocular pressure that can lead to blindness and it should be done every year by an eye professional.    Cardiovascular screening tests that check for elevated lipids (fatty part of blood) which can lead to heart disease and strokes should be done every 5 years. Colorectal screening that evaluates for blood or polyps in your colon should be done yearly as a stool test or every five years as a flexible sigmoidoscope or every 10 years as a colonoscopy up to age 76. Breast cancer screening with a mammogram is recommended biennially  for women age 54-69. Screening for cervical cancer with a pap smear and pelvic exam is recommended for women after age 72 years every 2 years up to age 79 or when the provider and patient decide to stop. If there is a history of cervical abnormalities or other increased risk for cancer then the test is recommended yearly.     Hepatitis C screening is also recommended for anyone born between [birth year] through [address]. A shingles vaccine is also recommended once in a lifetime after age 61. Your Medicare Wellness Exam is recommended annually. Here is a list of your current Health Maintenance items with a due date:  Health Maintenance Due   Topic Date Due    Annual Well Visit  12/03/2017          Learning About Living [place]  What is a living will? A living will is a legal form you use to write down the kind of care you want at the end of your life. It is used by the health professionals who will treat you if you aren't able to decide for yourself. If you put your wishes in writing, your loved ones and others will know what kind of care you want. They won't need to guess. This can ease your mind and be helpful to others. A living will is not the same as an estate or property will. An estate will explains what you want to happen with your money and property after you die. Is a living will a legal document? A living will is a legal document. Each state has its own laws about living trujillo. If you move to another state, make sure that your living will is legal in the state where you now live. Or you might use a universal form that has been approved by many states. This kind of form can sometimes be completed and stored online. Your electronic copy will then be available wherever you have a connection to the Internet. In most cases, doctors will respect your wishes even if you have a form from a different state. · You don't need an  to complete a living will. But legal advice can be helpful if your state's laws are unclear, your health history is complicated, or your family can't agree on what should be in your living will. · You can change your living will at any time. Some people find that their wishes about end-of-life care change as their health changes. · In addition to making a living will, think about completing a medical power of  form.  This form lets you name the person you want to make end-of-life treatment decisions for you (your \"health care agent\") if you're not able to. Many hospitals and nursing homes will give you the forms you need to complete a living will and a medical power of . · Your living will is used only if you can't make or communicate decisions for yourself anymore. If you become able to make decisions again, you can accept or refuse any treatment, no matter what you wrote in your living will. · Your state may offer an online registry. This is a place where you can store your living will online so the doctors and nurses who need to treat you can find it right away. What should you think about when creating a living will? Talk about your end-of-life wishes with your family members and your doctor. Let them know what you want. That way the people making decisions for you won't be surprised by your choices. Think about these questions as you make your living will:  · Do you know enough about life support methods that might be used? If not, talk to your doctor so you know what might be done if you can't breathe on your own, your heart stops, or you're unable to swallow. · What things would you still want to be able to do after you receive life-support methods? Would you want to be able to walk? To speak? To eat on your own? To live without the help of machines? · If you have a choice, where do you want to be cared for? In your home? At a hospital or nursing home? · Do you want certain Restorationism practices performed if you become very ill? · If you have a choice at the end of your life, where would you prefer to die? At home? In a hospital or nursing home? Somewhere else? · Would you prefer to be buried or cremated? · Do you want your organs to be donated after you die? What should you do with your living will? · Make sure that your family members and your health care agent have copies of your living will.   · Give your doctor a copy of your living will to keep in your medical record. If you have more than one doctor, make sure that each one has a copy. · You may want to put a copy of your living will where it can be easily found. Where can you learn more? Go to http://radha-zunilda.info/. Enter I797 in the search box to learn more about \"Learning About Living [place]. \"  Current as of: September 24, 2016  Content Version: 11.4  © 2656-0492 Healthwise, [employer]. Care instructions adapted under license by [employer] (which disclaims liability or warranty for this information). If you have questions about a medical condition or this instruction, always ask your healthcare professional. [address] any warranty or liability for your use of this information.

## 2018-01-08 NOTE — PROGRESS NOTES
This is a Subsequent Medicare Annual Wellness Exam (AWV) (Performed 12 months after IPPE or effective date of Medicare Part B enrollment)    I have reviewed the patient's medical history in detail and updated the computerized patient record. History     Here for wellness exam, her back and shoulder pain has resolved with some rest.  Still working some part-time at [employer] but thinks they are trying to put her out. Past Medical History:   Diagnosis Date    Carotid stenosis, bilateral 2011    <50%    Chronic sinusitis     Hyperlipidemia 5/11/2010    Hypertension     Knee pain, right     Osteoporosis     Other ill-defined conditions(799.89) L rib cage area    shingles 4/17/2014      Past Surgical History:   Procedure Laterality Date    ABDOMEN SURGERY PROC UNLISTED      HX CATARACT REMOVAL  2014    HX COLECTOMY  04/2014    for a perf    HX COLONOSCOPY  2011    with EGD    HX GYN      hysterectomy    HX GYN      vaginal delivery x 3    HX KNEE REPLACEMENT Left 07/28/2016     Current Outpatient Prescriptions   Medication Sig Dispense Refill    lisinopril (PRINIVIL, ZESTRIL) 5 mg tablet TAKE ONE TABLET BY MOUTH ONCE DAILY 90 Tab 1    naproxen sodium (ALEVE) 220 mg tablet Take 220 mg by mouth two (2) times daily (with meals).  loperamide (IMMODIUM) 2 mg tablet Take 2 mg by mouth four (4) times daily as needed for Diarrhea.  traZODone (DESYREL) 50 mg tablet TAKE UP TO 3 PER NIGHT AS NEEDED BY MOUTH. 90 Tab 5    alendronate (FOSAMAX) 70 mg tablet Take 1 Tab by mouth every seven (7) days. 5 Tab 11    tolterodine ER (DETROL-LA) 2 mg ER capsule Take 1 Cap by mouth daily. 90 Cap 3    raNITIdine (ZANTAC) 150 mg tablet TAKE ONE TABLET BY MOUTH TWICE DAILY FOR  ACID  REFLUX 60 Tab 11    Calcium-Cholecalciferol, D3, 500 mg(1,250mg) -400 unit tab Take  by mouth.  co-enzyme Q-10 (CO Q-10) 100 mg capsule Take 1 capsule by mouth daily.  90 capsule 1     Allergies   Allergen Reactions    Naprosyn [Naproxen] Nausea Only     Family History   Problem Relation Age of Onset    Diabetes Mother     Diabetes Sister     Stroke Son      Social History   Substance Use Topics    Smoking status: Former Smoker     Packs/day: 1.00     Years: 0.00     Quit date: 5/11/1990    Smokeless tobacco: Never Used    Alcohol use No     Patient Active Problem List   Diagnosis Code    Hyperlipidemia E78.5    Hypertriglyceridemia E78.1    Osteoporosis M81.0    Pre-diabetes R73.03    Osteoarthritis, knee M17.10    Diverticulosis K57.90    Essential hypertension I10       Depression Risk Factor Screening:     PHQ over the last two weeks 1/8/2018   Little interest or pleasure in doing things Not at all   Feeling down, depressed or hopeless Not at all   Total Score PHQ 2 0     Alcohol Risk Factor Screening: You do not drink alcohol or very rarely. Functional Ability and Level of Safety:   Hearing Loss  The patient wears hearing aids. Activities of Daily Living  The home contains: handrails and grab bars  Patient does total self care    Fall Risk  Fall Risk Assessment, last 12 mths 1/8/2018   Able to walk? Yes   Fall in past 12 months? No   Fall with injury? -   Number of falls in past 12 months -   Fall Risk Score -       Abuse Screen  Patient is not abused    Cognitive Screening   Evaluation of Cognitive Function:  Has your family/caregiver stated any concerns about your memory: no  Normal    Patient Care Team   Patient Care Team:  Ayla Cooper MD as PCP - General (Family Practice)  [facility]    Assessment/Plan   Education and counseling provided:  Are appropriate based on today's review and evaluation      ICD-10-CM ICD-9-CM    1. Medicare annual wellness visit, subsequent Z00.00 V70.0 PA HANDLG&/OR CONVEY OF SPEC FOR TR OFFICE TO LAB      PA COLLECTION VENOUS BLOOD,VENIPUNCTURE   2.  Essential hypertension M53 395.3 METABOLIC PANEL, BASIC      PA HANDLG&/OR CONVEY OF SPEC FOR TR OFFICE TO LAB      PA COLLECTION VENOUS BLOOD,VENIPUNCTURE   3. Hyperlipidemia, unspecified hyperlipidemia type E78.5 272.4 LIPID PANEL      GA HANDLG&/OR CONVEY OF SPEC FOR TR OFFICE TO LAB      GA COLLECTION VENOUS BLOOD,VENIPUNCTURE   4. Screening for alcoholism Z13.89 V79.1 GA ANNUAL ALCOHOL SCREEN 15 MIN      GA HANDLG&/OR CONVEY OF SPEC FOR TR OFFICE TO LAB      GA COLLECTION VENOUS BLOOD,VENIPUNCTURE   5. Screening for depression Z13.89 V79.0 DEPRESSION SCREEN ANNUAL      GA HANDLG&/OR CONVEY OF SPEC FOR TR OFFICE TO LAB      GA COLLECTION VENOUS BLOOD,VENIPUNCTURE   6. Advance directive in chart Z78.9 V49.89 GA HANDLG&/OR CONVEY OF SPEC FOR TR OFFICE TO LAB      GA COLLECTION VENOUS BLOOD,VENIPUNCTURE   7. Age related osteoporosis, unspecified pathological fracture presence M81.0 733.01    8. Counseling regarding advanced directives Z71.89 V65.49      Orders Placed This Encounter    Depression Screen Annual    METABOLIC PANEL, BASIC    LIPID PANEL    Annual  Alcohol Screen 15 min ()    GA HANDLG&/OR CONVEY OF SPEC FOR TR OFFICE TO LAB    GA COLLECTION VENOUS BLOOD,VENIPUNCTURE         Health Maintenance Due   Topic Date Due    MEDICARE YEARLY EXAM  12/03/2017     With their permission we spent some time discussing advanced directives and DNR status. Described what elements are involved and the need to have a designated provider. Paperwork involving the above was given to the patient. See patient instructions, went over them personally with the patient. Emphasized compliance. Questions answered. Patient states that they understand the plan of action and will call if there are any issues or misunderstandings. Follow-up Disposition:  Return in about 4 months (around 5/8/2018).

## 2018-01-09 LAB
BUN SERPL-MCNC: 22 MG/DL (ref 8–27)
BUN/CREAT SERPL: 20 (ref 12–28)
CALCIUM SERPL-MCNC: 10.2 MG/DL (ref 8.7–10.3)
CHLORIDE SERPL-SCNC: 101 MMOL/L (ref 96–106)
CHOLEST SERPL-MCNC: 250 MG/DL (ref 100–199)
CO2 SERPL-SCNC: 17 MMOL/L (ref 18–29)
CREAT SERPL-MCNC: 1.1 MG/DL (ref 0.57–1)
GLUCOSE SERPL-MCNC: 68 MG/DL (ref 65–99)
HDLC SERPL-MCNC: 54 MG/DL
INTERPRETATION, 910389: NORMAL
INTERPRETATION: NORMAL
LDLC SERPL CALC-MCNC: 169 MG/DL (ref 0–99)
PDF IMAGE, 910387: NORMAL
POTASSIUM SERPL-SCNC: 5.5 MMOL/L (ref 3.5–5.2)
SODIUM SERPL-SCNC: 142 MMOL/L (ref 134–144)
TRIGL SERPL-MCNC: 137 MG/DL (ref 0–149)
VLDLC SERPL CALC-MCNC: 27 MG/DL (ref 5–40)

## 2018-01-10 ENCOUNTER — TELEPHONE (OUTPATIENT)
Dept: INTERNAL MEDICINE CLINIC | Age: 78
End: 2018-01-10

## 2018-01-10 DIAGNOSIS — I10 ESSENTIAL HYPERTENSION: Primary | ICD-10-CM

## 2018-01-11 ENCOUNTER — CLINICAL SUPPORT (OUTPATIENT)
Dept: INTERNAL MEDICINE CLINIC | Age: 78
End: 2018-01-11

## 2018-01-11 VITALS
SYSTOLIC BLOOD PRESSURE: 140 MMHG | HEART RATE: 80 BPM | RESPIRATION RATE: 16 BRPM | OXYGEN SATURATION: 99 % | TEMPERATURE: 97.5 F | DIASTOLIC BLOOD PRESSURE: 80 MMHG

## 2018-01-11 DIAGNOSIS — I10 ESSENTIAL HYPERTENSION: Primary | ICD-10-CM

## 2018-01-11 DIAGNOSIS — E78.5 HYPERLIPIDEMIA, UNSPECIFIED HYPERLIPIDEMIA TYPE: ICD-10-CM

## 2018-01-11 NOTE — PROGRESS NOTES
Patient in for blood pressure check and lab draw for repeat potassium level per order of Dr Lorrie Klein  - no other complaints  Allan Ham LPN  7/58/7350  00:20 AM

## 2018-01-11 NOTE — LETTER
1/15/2018 8:23 AM 
 
Ms. Ginger Rodriguez [address] [place] [address] Dear Ginger Rodriguez: 
 
Please find your most recent results below. Resulted Orders POTASSIUM Result Value Ref Range Potassium 4.1 3.5 - 5.2 mmol/L Narrative Performed at:  [address]  [id number] : Vlad Lang MD, Phone:  [phone] RECOMMENDATIONS: 
The results of your most recent blood test was normal.  Follow up as scheduled. Please call me if you have any questions: [phone] Sincerely, Fide Stroud MD

## 2018-01-12 LAB — POTASSIUM SERPL-SCNC: 4.1 MMOL/L (ref 3.5–5.2)

## 2018-01-15 ENCOUNTER — OFFICE VISIT (OUTPATIENT)
Dept: INTERNAL MEDICINE CLINIC | Age: 78
End: 2018-01-15

## 2018-01-15 VITALS
HEIGHT: 61 IN | RESPIRATION RATE: 20 BRPM | BODY MASS INDEX: 25.49 KG/M2 | SYSTOLIC BLOOD PRESSURE: 118 MMHG | TEMPERATURE: 96.5 F | WEIGHT: 135 LBS | OXYGEN SATURATION: 98 % | DIASTOLIC BLOOD PRESSURE: 61 MMHG | HEART RATE: 80 BPM

## 2018-01-15 DIAGNOSIS — B34.9 VIRAL SYNDROME: Primary | ICD-10-CM

## 2018-01-15 DIAGNOSIS — I10 ESSENTIAL HYPERTENSION: ICD-10-CM

## 2018-01-15 NOTE — PROGRESS NOTES
Chief Complaint   Patient presents with    Lethargy     fever over weekend    Decreased Appetite     I have reviewed the patient's medical history in detail and updated the computerized patient record. Health Maintenance reviewed. 1. Have you been to the ER, urgent care clinic since your last visit? Hospitalized since your last visit?no    2. Have you seen or consulted any other health care providers outside of the [address] [place] [address] since your last visit? Include any pap smears or colon screening.  no    Encouraged pt to discuss pt's wishes with spouse/partner/family and bring them in the next appt to follow thru with the Advanced Directive

## 2018-01-15 NOTE — PATIENT INSTRUCTIONS
Honey or agave nectar is known to help a cough, 1 teaspoon every 4 hours as needed for cough. Medicines like Mucinex Theraflu or Coricidin may help. Cold symptoms get better on there own without antibiotics. Over the counter cold medications should not be used for children.

## 2018-01-15 NOTE — MR AVS SNAPSHOT
Visit Information Date & Time Provider Department Dept. Phone Encounter #  
 1/15/2018  9:00 AM MD Mayelin Ni 380 [phone] [id number] Follow-up Instructions Return in about 3 months (around 4/15/2018), or if symptoms worsen or fail to improve. Your Appointments 5/8/2018  9:30 AM  
ROUTINE CARE with MD Mayelin Ni 380 ([address]) Appt Note: f/u on bp $0 cp lsh 1/8/18  
 [address] [place] [address] Road. [address] [place] [address]  
[phone]  
  
   
 [address] [place] [address] [address] Upcoming Health Maintenance Date Due  
 MEDICARE YEARLY EXAM 1/9/2019 GLAUCOMA SCREENING Q2Y 6/9/2019 DTaP/Tdap/Td series (2 - Td) 2/6/2027 Allergies as of 1/15/2018  Review Complete On: 1/15/2018 By: Obdulio Johnson LPN Severity Noted Reaction Type Reactions Naprosyn [Naproxen]  05/18/2015    Nausea Only Current Immunizations  Reviewed on 2/6/2017 Name Date H1N1 FLU VACCINE 3/24/2010 Influenza High Dose Vaccine PF 9/11/2017 Influenza Vaccine 8/20/2014, 9/11/2013 Influenza Vaccine Kristie Senna) 10/1/2015 Influenza Vaccine (Quad) PF 8/25/2016 Influenza Vaccine Split 9/13/2012, 10/6/2011 Influenza Vaccine Whole 10/7/2009 Pneumococcal Conjugate (PCV-13) 9/30/2016 Tdap 2/6/2017 ZZZ-RETIRED (DO NOT USE) Pneumococcal Vaccine (Unspecified Type) 4/28/2010 Zoster Vaccine, Live 4/8/2015 Not reviewed this visit You Were Diagnosed With   
  
 Codes Comments Viral syndrome    -  Primary ICD-10-CM: B34.9 ICD-9-CM: 079.99 Essential hypertension     ICD-10-CM: I10 
ICD-9-CM: 401.9 Vitals BP Pulse Temp Resp Height(growth percentile) Weight(growth percentile)  
 118/61 (BP 1 Location: Left arm, BP Patient Position: At rest) 80 96.5 °F (35.8 °C) (Oral) 20 5' 1\" (1.549 m) 135 lb (61.2 kg) SpO2 BMI OB Status Smoking Status 98% 25.51 kg/m2 Hysterectomy Former Smoker BMI and BSA Data Body Mass Index Body Surface Area 25.51 kg/m 2 1.62 m 2 Preferred Pharmacy Pharmacy Name Phone [facility] [phone] [phone] Your Updated Medication List  
  
   
This list is accurate as of: 1/15/18  9:46 AM.  Always use your most recent med list.  
  
  
  
  
 alendronate 70 mg tablet Commonly known as:  FOSAMAX Take 1 Tab by mouth every seven (7) days. ALEVE 220 mg tablet Generic drug:  naproxen sodium Take 220 mg by mouth two (2) times daily (with meals). Calcium-Cholecalciferol (D3) 500 mg(1,250mg) -400 unit Tab Take  by mouth. co-enzyme Q-10 100 mg capsule Commonly known as:  CO Q-10 Take 1 capsule by mouth daily. lisinopril 5 mg tablet Commonly known as:  PRINIVIL, ZESTRIL  
TAKE ONE TABLET BY MOUTH ONCE DAILY  
  
 loperamide 2 mg tablet Commonly known as:  IMMODIUM Take 2 mg by mouth four (4) times daily as needed for Diarrhea. raNITIdine 150 mg tablet Commonly known as:  ZANTAC TAKE ONE TABLET BY MOUTH TWICE DAILY FOR  ACID  REFLUX  
  
 tolterodine ER 2 mg ER capsule Commonly known as:  DETROL-LA Take 1 Cap by mouth daily. traZODone 50 mg tablet Commonly known as:  DESYREL  
TAKE UP TO 3 PER NIGHT AS NEEDED BY MOUTH. Follow-up Instructions Return in about 3 months (around 4/15/2018), or if symptoms worsen or fail to improve. Patient Instructions Honey or agave nectar is known to help a cough, 1 teaspoon every 4 hours as needed for cough. Medicines like Mucinex Theraflu or Coricidin may help. Cold symptoms get better on there own without antibiotics. Over the counter cold medications should not be used for children. Introducing [facility]! Dear Glen Meeks: Thank you for requesting a [employer] account.   Our records indicate that you have previously registered for a [employer] account but its currently inactive. Please call our [employer] support line at [phone]. Additional Information If you have questions, please visit the Frequently Asked Questions section of the [employer] website at [email or web address] Remember, [employer] is NOT to be used for urgent needs. For medical emergencies, dial 911. Now available from your iPhone and Android! Please provide this summary of care documentation to your next provider. Your primary care clinician is listed as Britney Michele. If you have any questions after today's visit, please call [phone].

## 2018-01-15 NOTE — LETTER
NOTIFICATION OF RETURN TO WORK 
 
1/15/2018 Ms. Caren B[address] [place] [address] Mya Dies To Whom It May Concern: 
 
Caren Borden was under the care of [address]. She will be able to return to work on January 19, 2018. If there are questions or concerns please have the patient contact our office. Sincerely, Sarah Wiggins MD

## 2018-01-15 NOTE — PROGRESS NOTES
Subjective: Kathleen Pacheco is a 65 y.o. female who complains of dry sl cough, myalgias and chills sweats for 1-2 days, gradually improving since that time. She denies a history of shortness of breath, vomiting and sputum production. Evaluation to date: none. Treatment to date: OTC products. Patient does not smoke cigarettes. Relevant PMH: No pertinent additional PMH. Allergies   Allergen Reactions    Naprosyn [Naproxen] Nausea Only         Patient Active Problem List    Diagnosis Date Noted    Essential hypertension 08/25/2016    Diverticulosis 05/08/2014    Osteoarthritis, knee 12/18/2012    Osteoporosis 12/13/2012    Pre-diabetes 12/13/2012    Hypertriglyceridemia 06/16/2011    Hyperlipidemia 05/11/2010     Current Outpatient Prescriptions   Medication Sig Dispense Refill    lisinopril (PRINIVIL, ZESTRIL) 5 mg tablet TAKE ONE TABLET BY MOUTH ONCE DAILY 90 Tab 1    naproxen sodium (ALEVE) 220 mg tablet Take 220 mg by mouth two (2) times daily (with meals).  loperamide (IMMODIUM) 2 mg tablet Take 2 mg by mouth four (4) times daily as needed for Diarrhea.  traZODone (DESYREL) 50 mg tablet TAKE UP TO 3 PER NIGHT AS NEEDED BY MOUTH. 90 Tab 5    alendronate (FOSAMAX) 70 mg tablet Take 1 Tab by mouth every seven (7) days. 5 Tab 11    tolterodine ER (DETROL-LA) 2 mg ER capsule Take 1 Cap by mouth daily. 90 Cap 3    raNITIdine (ZANTAC) 150 mg tablet TAKE ONE TABLET BY MOUTH TWICE DAILY FOR  ACID  REFLUX 60 Tab 11    Calcium-Cholecalciferol, D3, 500 mg(1,250mg) -400 unit tab Take  by mouth.  co-enzyme Q-10 (CO Q-10) 100 mg capsule Take 1 capsule by mouth daily. 90 capsule 1     Allergies   Allergen Reactions    Naprosyn [Naproxen] Nausea Only        Review of Systems  Pertinent items are noted in HPI.     Objective:     Visit Vitals    /61 (BP 1 Location: Left arm, BP Patient Position: At rest)    Pulse 80    Temp 96.5 °F (35.8 °C) (Oral)    Resp 20    Ht 5' 1\" (1.549 m)    Wt 135 lb (61.2 kg)    SpO2 98%    BMI 25.51 kg/m2     General:  alert, cooperative, no distress   Eyes: negative   Ears: normal TM's and external ear canals AU   Sinuses: Normal paranasal sinuses without tenderness   Mouth:  Lips, mucosa, and tongue normal. Teeth and gums normal   Neck: supple, symmetrical, trachea midline and no adenopathy. Heart: S1 and S2 normal, no murmurs noted. Lungs: clear to auscultation bilaterally   Abdomen: soft, non-tender. Bowel sounds normal. No masses,  no organomegaly      Assessment/Plan:   viral upper respiratory illness  Suggested symptomatic OTC remedies. RTC prn. Discussed diagnosis and treatment of viral URIs. Discussed the importance of avoiding unnecessary antibiotic therapy. Encounter Diagnoses   Name Primary?  Viral syndrome Yes    Essential hypertension      Note for off work. HTN good. Follow-up Disposition:  Return in about 3 months (around 4/15/2018), or if symptoms worsen or fail to improve.

## 2018-01-18 ENCOUNTER — TELEPHONE (OUTPATIENT)
Dept: INTERNAL MEDICINE CLINIC | Age: 78
End: 2018-01-18

## 2018-01-18 ENCOUNTER — OFFICE VISIT (OUTPATIENT)
Dept: INTERNAL MEDICINE CLINIC | Age: 78
End: 2018-01-18

## 2018-01-18 VITALS
DIASTOLIC BLOOD PRESSURE: 60 MMHG | TEMPERATURE: 98.9 F | BODY MASS INDEX: 25.3 KG/M2 | HEART RATE: 72 BPM | HEIGHT: 61 IN | OXYGEN SATURATION: 92 % | WEIGHT: 134 LBS | RESPIRATION RATE: 16 BRPM | SYSTOLIC BLOOD PRESSURE: 110 MMHG

## 2018-01-18 DIAGNOSIS — E78.5 HYPERLIPIDEMIA, UNSPECIFIED HYPERLIPIDEMIA TYPE: ICD-10-CM

## 2018-01-18 DIAGNOSIS — R68.89 COLD INTOLERANCE: ICD-10-CM

## 2018-01-18 DIAGNOSIS — I10 ESSENTIAL HYPERTENSION: ICD-10-CM

## 2018-01-18 DIAGNOSIS — R68.83 CHILLS: Primary | ICD-10-CM

## 2018-01-18 RX ORDER — CEFUROXIME AXETIL 500 MG/1
500 TABLET ORAL 2 TIMES DAILY
Qty: 14 TAB | Refills: 0 | Status: SHIPPED | OUTPATIENT
Start: 2018-01-18 | End: 2018-01-25

## 2018-01-18 NOTE — PROGRESS NOTES
HISTORY OF PRESENT ILLNESS  Kathleen Pacheco is a 65 y.o. female. Altered mental status    The history is provided by the patient and relative. This is a new problem. The current episode started more than 1 week ago. Associated symptoms include confusion. Associated symptoms comments: She thought son coming home but he was leaving. Mental status baseline is normal.  Her past medical history does not include CVA or head trauma. Fatigue   The current episode started more than 1 week ago. Associated symptoms comments: Coughing congestion. Treatments tried: aleve.   seen few days ago thought to have URI    Allergies   Allergen Reactions    Naprosyn [Naproxen] Nausea Only     Social History     Social History    Marital status:      Spouse name: N/A    Number of children: 3    Years of education: N/A     Occupational History     [employer]     Social History Main Topics    Smoking status: Former Smoker     Packs/day: 1.00     Years: 0.00     Quit date: 1990    Smokeless tobacco: Never Used    Alcohol use No    Drug use: No    Sexual activity: No     Other Topics Concern    Not on file     Social History Narrative    Son lives with her, son passed away  2016 of an aneurysm         Review of Systems   Constitutional: Positive for chills and fatigue. Negative for fever and weight loss. Cold x many months   Gastrointestinal: Negative for blood in stool. Genitourinary: Negative for dysuria. Psychiatric/Behavioral: Positive for confusion.        Physical Exam  Visit Vitals    /60 (BP 1 Location: Left arm, BP Patient Position: At rest)  Comment (BP 1 Location): manual    Pulse 72    Temp 98.9 °F (37.2 °C) (Oral)    Resp 16    Ht 5' 1\" (1.549 m)    Wt 134 lb (60.8 kg)    SpO2 92%    BMI 25.32 kg/m2       WDWN NAD  TM clear, throat wnl  Neck no adenopathy  Heart RRR no C/M/R  Lungs CTA  Abdo soft non tender  Ext No redness swelling or edema    ASSESSMENT and PLAN  Encounter Diagnoses   Name Primary?  Chills Yes    Essential hypertension     Cold intolerance     Hyperlipidemia, unspecified hyperlipidemia type      Orders Placed This Encounter    XR CHEST PA LAT    cefUROXime (CEFTIN) 500 mg tablet     Cause of chills not entirely clear consider pneumonia. AB as above. If chills do not improve get cxr. Work up [employer] with labs when she returns   HTN stable. Current Outpatient Prescriptions   Medication Sig Dispense Refill    cefUROXime (CEFTIN) 500 mg tablet Take 1 Tab by mouth two (2) times a day for 7 days. 14 Tab 0    lisinopril (PRINIVIL, ZESTRIL) 5 mg tablet TAKE ONE TABLET BY MOUTH ONCE DAILY 90 Tab 1    naproxen sodium (ALEVE) 220 mg tablet Take 220 mg by mouth two (2) times daily (with meals).  loperamide (IMMODIUM) 2 mg tablet Take 2 mg by mouth four (4) times daily as needed for Diarrhea.  traZODone (DESYREL) 50 mg tablet TAKE UP TO 3 PER NIGHT AS NEEDED BY MOUTH. 90 Tab 5    alendronate (FOSAMAX) 70 mg tablet Take 1 Tab by mouth every seven (7) days. 5 Tab 11    tolterodine ER (DETROL-LA) 2 mg ER capsule Take 1 Cap by mouth daily. 90 Cap 3    raNITIdine (ZANTAC) 150 mg tablet TAKE ONE TABLET BY MOUTH TWICE DAILY FOR  ACID  REFLUX 60 Tab 11    Calcium-Cholecalciferol, D3, 500 mg(1,250mg) -400 unit tab Take  by mouth.  co-enzyme Q-10 (CO Q-10) 100 mg capsule Take 1 capsule by mouth daily.  90 capsule 1     Follow-up Disposition:  Return in about 1 week (around 1/25/2018) for routine follow up.  labs

## 2018-01-18 NOTE — LETTER
NOTIFICATION RETURN TO WORK / SCHOOL 
 
1/18/2018 10:56 AM 
 
Ms. Brissa Bustillos [address] [place] [address] To Whom It May Concern: 
 
Brissa Bustillos is currently under the care of [address]. She will return to work/school on: 01/25/2018 If there are questions or concerns please have the patient contact our office. Sincerely, Brenden Valdovinos MD

## 2018-01-18 NOTE — TELEPHONE ENCOUNTER
Patient called stating that she feels confused about the time of day - was preparing dinner for her son this morning as he was leaving for work, she thought it was nighttime - she feels concerned and says she thinks Odilia Kaba has lost her mind\" - called her daughter and asked what she was supposed to be doing today - appt was made for patient to come in and talk to Dr Sena Garcia about her concerns for 1020am today - asked patient is she felt that she could drive herself and she agreed that she could - denies dizziness or pain  Carine Lane LPN  6/02/2902  9:63 AM

## 2018-01-18 NOTE — MR AVS SNAPSHOT
[address] [address] [facility] 
[phone] Patient: Blayne Dickson MRN: [id number] Visit Information Date & Time Provider Department Dept. Phone Encounter #  
 1/18/2018 10:15 AM MD Marbella Zuñiga Dr [id number] Follow-up Instructions Return in about 1 week (around 1/25/2018) for routine follow up. Your Appointments 5/8/2018  9:30 AM  
ROUTINE CARE with MD Mayelin Zu[address]iga [address] ([facility]) Appt Note: f/u on bp $0 cp lsh 1/8/18  
 [address] [place] [address]. [address] [place] [address]  
[phone]  
  
   
 [address] [place] [address] [address] Upcoming Health Maintenance Date Due  
 MEDICARE YEARLY EXAM 1/9/2019 GLAUCOMA SCREENING Q2Y 6/9/2019 DTaP/Tdap/Td series (2 - Td) 2/6/2027 Allergies as of 1/18/2018  Review Complete On: 1/18/2018 By: Hannah Goldsmith MD  
  
 Severity Noted Reaction Type Reactions Naprosyn [Naproxen]  05/18/2015    Nausea Only Current Immunizations  Reviewed on 2/6/2017 Name Date H1N1 FLU VACCINE 3/24/2010 Influenza High Dose Vaccine PF 9/11/2017 Influenza Vaccine 8/20/2014, 9/11/2013 Influenza Vaccine Charles Pulley) 10/1/2015 Influenza Vaccine (Quad) PF 8/25/2016 Influenza Vaccine Split 9/13/2012, 10/6/2011 Influenza Vaccine Whole 10/7/2009 Pneumococcal Conjugate (PCV-13) 9/30/2016 Tdap 2/6/2017 ZZZ-RETIRED (DO NOT USE) Pneumococcal Vaccine (Unspecified Type) 4/28/2010 Zoster Vaccine, Live 4/8/2015 Not reviewed this visit You Were Diagnosed With   
  
 Codes Comments Chills    -  Primary ICD-10-CM: R68.83 ICD-9-CM: 780.64 Essential hypertension     ICD-10-CM: I10 
ICD-9-CM: 401.9 Vitals BP Pulse Temp Resp Height(growth percentile) Weight(growth percentile) 110/60 (BP 1 Location: Left arm, BP Patient Position: At rest) 72 98.9 °F (37.2 °C) (Oral) 16 5' 1\" (1.549 m) 134 lb (60.8 kg) SpO2 BMI OB Status Smoking Status 92% 25.32 kg/m2 Hysterectomy Former Smoker Vitals History BMI and BSA Data Body Mass Index Body Surface Area  
 25.32 kg/m 2 1.62 m 2 Preferred Pharmacy Pharmacy Name Phone [facility] [address] [facility], [address] [place] [phone] Your Updated Medication List  
  
   
This list is accurate as of: 1/18/18 10:54 AM.  Always use your most recent med list.  
  
  
  
  
 alendronate 70 mg tablet Commonly known as:  FOSAMAX Take 1 Tab by mouth every seven (7) days. ALEVE 220 mg tablet Generic drug:  naproxen sodium Take 220 mg by mouth two (2) times daily (with meals). Calcium-Cholecalciferol (D3) 500 mg(1,250mg) -400 unit Tab Take  by mouth. cefUROXime 500 mg tablet Commonly known as:  CEFTIN Take 1 Tab by mouth two (2) times a day for 7 days. co-enzyme Q-10 100 mg capsule Commonly known as:  CO Q-10 Take 1 capsule by mouth daily. lisinopril 5 mg tablet Commonly known as:  PRINIVIL, ZESTRIL  
TAKE ONE TABLET BY MOUTH ONCE DAILY  
  
 loperamide 2 mg tablet Commonly known as:  IMMODIUM Take 2 mg by mouth four (4) times daily as needed for Diarrhea. raNITIdine 150 mg tablet Commonly known as:  ZANTAC TAKE ONE TABLET BY MOUTH TWICE DAILY FOR  ACID  REFLUX  
  
 tolterodine ER 2 mg ER capsule Commonly known as:  DETROL-LA Take 1 Cap by mouth daily. traZODone 50 mg tablet Commonly known as:  DESYREL  
TAKE UP TO 3 PER NIGHT AS NEEDED BY MOUTH. Prescriptions Sent to Pharmacy Refills  
 cefUROXime (CEFTIN) 500 mg tablet 0 Sig: Take 1 Tab by mouth two (2) times a day for 7 days. Class: Normal  
 Pharmacy: [facility] DR TRACY BILLINGS 2002 [facility], [address] Ph #: [phone] Route: Oral  
  
Follow-up Instructions Return in about 1 week (around 1/25/2018) for routine follow up. To-Do List   
 01/18/2018 Imaging:  XR CHEST PA LAT Introducing [facility] SERVICES! Dear Hue Del Angel: Thank you for requesting a [employer] account. Our records indicate that you have previously registered for a [employer] account but its currently inactive. Please call our [employer] support line at [phone]. Additional Information If you have questions, please visit the Frequently Asked Questions section of the [employer] website at [email or web address] Remember, [employer] is NOT to be used for urgent needs. For medical emergencies, dial 911. Now available from your iPhone and Android! Please provide this summary of care documentation to your next provider. Your primary care clinician is listed as Darby Ross. If you have any questions after today's visit, please call [phone].

## 2018-01-19 ENCOUNTER — DOCUMENTATION ONLY (OUTPATIENT)
Dept: INTERNAL MEDICINE CLINIC | Age: 78
End: 2018-01-19

## 2018-01-19 NOTE — PROGRESS NOTES
Pt misplaced return to work letter wanted it printed and faxed to human resources at [employer] attention [facility]

## 2018-01-29 ENCOUNTER — OFFICE VISIT (OUTPATIENT)
Dept: INTERNAL MEDICINE CLINIC | Age: 78
End: 2018-01-29

## 2018-01-29 VITALS
OXYGEN SATURATION: 100 % | SYSTOLIC BLOOD PRESSURE: 143 MMHG | TEMPERATURE: 98.4 F | RESPIRATION RATE: 16 BRPM | HEART RATE: 75 BPM | DIASTOLIC BLOOD PRESSURE: 62 MMHG | HEIGHT: 61 IN | WEIGHT: 133 LBS | BODY MASS INDEX: 25.11 KG/M2

## 2018-01-29 DIAGNOSIS — J11.00 INFLUENZA AND PNEUMONIA: Primary | ICD-10-CM

## 2018-01-29 DIAGNOSIS — I10 ESSENTIAL HYPERTENSION: ICD-10-CM

## 2018-01-29 NOTE — PROGRESS NOTES
Subjective: Ginger Rodriguez is a 65 y.o. female who complains of congestion and chills for 7 days, gradually improving since that time. She denies a history of chills, fevers and cough. All mainly resolved    Evaluation to date: seen previously and thought to have a pneumonia  Treatment to date: antibiotics -ceftin. Began taking 7 days ago. .  Patient does not smoke cigarettes. Relevant PMH: No pertinent additional PMH. Allergies   Allergen Reactions    Naprosyn [Naproxen] Nausea Only         Current Outpatient Prescriptions   Medication Sig Dispense Refill    lisinopril (PRINIVIL, ZESTRIL) 5 mg tablet TAKE ONE TABLET BY MOUTH ONCE DAILY 90 Tab 1    naproxen sodium (ALEVE) 220 mg tablet Take 220 mg by mouth two (2) times daily (with meals).  loperamide (IMMODIUM) 2 mg tablet Take 2 mg by mouth four (4) times daily as needed for Diarrhea.  traZODone (DESYREL) 50 mg tablet TAKE UP TO 3 PER NIGHT AS NEEDED BY MOUTH. 90 Tab 5    alendronate (FOSAMAX) 70 mg tablet Take 1 Tab by mouth every seven (7) days. 5 Tab 11    tolterodine ER (DETROL-LA) 2 mg ER capsule Take 1 Cap by mouth daily. 90 Cap 3    raNITIdine (ZANTAC) 150 mg tablet TAKE ONE TABLET BY MOUTH TWICE DAILY FOR  ACID  REFLUX 60 Tab 11    Calcium-Cholecalciferol, D3, 500 mg(1,250mg) -400 unit tab Take  by mouth.  co-enzyme Q-10 (CO Q-10) 100 mg capsule Take 1 capsule by mouth daily. 90 capsule 1     Allergies   Allergen Reactions    Naprosyn [Naproxen] Nausea Only     Social History   Substance Use Topics    Smoking status: Former Smoker     Packs/day: 1.00     Years: 0.00     Quit date: 5/11/1990    Smokeless tobacco: Never Used    Alcohol use No        Review of Systems  Pertinent items are noted in HPI.     Objective:     Visit Vitals    /62 (BP 1 Location: Left arm, BP Patient Position: Sitting)    Pulse 75    Temp 98.4 °F (36.9 °C) (Oral)    Resp 16    Ht 5' 1\" (1.549 m)    Wt 133 lb (60.3 kg)    SpO2 100%    BMI 25.13 kg/m2     General:  alert, cooperative, no distress   Eyes: negative   Ears: normal TM's and external ear canals AU   Sinuses: Normal paranasal sinuses without tenderness   Mouth:  Lips, mucosa, and tongue normal. Teeth and gums normal   Neck: supple, symmetrical, trachea midline and no adenopathy. Heart: S1 and S2 normal, no murmurs noted. Lungs: clear to auscultation bilaterally   Abdomen: soft, non-tender. Bowel sounds normal. No masses,  no organomegaly      Assessment/Plan:   Pneumonia made a good recovery, finished AB  OK note to return to work tomarrow  RTC prn. Encounter Diagnoses   Name Primary?  Influenza and pneumonia Yes    Essential hypertension          ICD-10-CM ICD-9-CM    1.  Influenza and pneumonia J11.00 487.0

## 2018-01-29 NOTE — PROGRESS NOTES
Chief Complaint   Patient presents with    Letter for School/Work     I have reviewed the patient's medical history in detail and updated the computerized patient record. 1. Have you been to the ER, urgent care clinic since your last visit? Hospitalized since your last visit?no    2. Have you seen or consulted any other health care providers outside of the [address] [place] [address] [place] since your last visit? Include any pap smears or colon screening. No    Encouraged pt to discuss pt's wishes with spouse/partner/family and bring them in the next appt to follow thru with the Advanced Directive    Fall Risk Assessment, last 12 mths 1/29/2018   Able to walk? Yes   Fall in past 12 months? No   Fall with injury? -   Number of falls in past 12 months -   Fall Risk Score -       PHQ over the last two weeks 1/29/2018   Little interest or pleasure in doing things Several days   Feeling down, depressed or hopeless Several days   Total Score PHQ 2 2       Abuse Screening Questionnaire 1/29/2018   Do you ever feel afraid of your partner? N   Are you in a relationship with someone who physically or mentally threatens you? N   Is it safe for you to go home?  Y       ADL Assessment 1/29/2018   Feeding yourself No Help Needed   Getting from bed to chair No Help Needed   Getting dressed No Help Needed   Bathing or showering No Help Needed   Walk across the room (includes cane/walker) No Help Needed   Using the telphone No Help Needed   Taking your medications No Help Needed   Preparing meals No Help Needed   Managing money (expenses/bills) No Help Needed   Moderately strenuous housework (laundry) No Help Needed   Shopping for personal items (toiletries/medicines) No Help Needed   Shopping for groceries No Help Needed   Driving No Help Needed   Climbing a flight of stairs No Help Needed   Getting to places beyond walking distances No Help Needed

## 2018-01-29 NOTE — LETTER
NOTIFICATION OF RETURN TO WORK  
 
1/29/2018 1:18 PM 
 
Ms. Brissa Bustillos [address] [address] [facility] To Whom It May Concern: 
 
Brissa Bustillos was under the care of [address]. She will be able to return to work on January 30, 2018. If there are questions or concerns please have the patient contact our office. Sincerely, Brenden Valdovinos MD

## 2018-01-29 NOTE — MR AVS SNAPSHOT
[address]. [address] [address] [facility] 
[phone] Patient: Jesus Gupta MRN: [id number] [facility]:[birth year] Visit Information Date & Time Provider Department Dept. Phone Encounter #  
 1/29/2018  1:00 PM MD Marbella Dewitt Dr [id number] Your Appointments 5/8/2018  9:30 AM  
ROUTINE CARE with MD Mayelin [address] ([facility]) Appt Note: f/u on bp $0 cp h 1/8/18  
 [address] [place] [address] Road. [address] [place] [address]  
[phone]  
  
   
 [address] [place] [address] [address] Upcoming Health Maintenance Date Due  
 MEDICARE YEARLY EXAM 1/9/2019 GLAUCOMA SCREENING Q2Y 6/9/2019 DTaP/Tdap/Td series (2 - Td) 2/6/2027 Allergies as of 1/29/2018  Review Complete On: 1/29/2018 By: Jennie Gonzalez LPN Severity Noted Reaction Type Reactions Naprosyn [Naproxen]  05/18/2015    Nausea Only Current Immunizations  Reviewed on 2/6/2017 Name Date H1N1 FLU VACCINE 3/24/2010 Influenza High Dose Vaccine PF 9/11/2017 Influenza Vaccine 8/20/2014, 9/11/2013 Influenza Vaccine Moon Dellen) 10/1/2015 Influenza Vaccine (Quad) PF 8/25/2016 Influenza Vaccine Split 9/13/2012, 10/6/2011 Influenza Vaccine Whole 10/7/2009 Pneumococcal Conjugate (PCV-13) 9/30/2016 Tdap 2/6/2017 ZZZ-RETIRED (DO NOT USE) Pneumococcal Vaccine (Unspecified Type) 4/28/2010 Zoster Vaccine, Live 4/8/2015 Not reviewed this visit You Were Diagnosed With   
  
 Codes Comments Influenza and pneumonia    -  Primary ICD-10-CM: J11.00 ICD-9-CM: 487.0 Essential hypertension     ICD-10-CM: I10 
ICD-9-CM: 401.9 Vitals BP Pulse Temp Resp Height(growth percentile) Weight(growth percentile)  143/62 (BP 1 Location: Left arm, BP Patient Position: Sitting) 75 98.4 °F (36.9 °C) (Oral) 16 5' 1\" (1.549 m) 133 lb (60.3 kg) SpO2 BMI OB Status Smoking Status 100% 25.13 kg/m2 Hysterectomy Former Smoker BMI and BSA Data Body Mass Index Body Surface Area  
 25.13 kg/m 2 1.61 m 2 Preferred Pharmacy Pharmacy Name Phone [facility] [address] [facility] [phone] Your Updated Medication List  
  
   
This list is accurate as of: 1/29/18  1:17 PM.  Always use your most recent med list.  
  
  
  
  
 alendronate 70 mg tablet Commonly known as:  FOSAMAX Take 1 Tab by mouth every seven (7) days. ALEVE 220 mg tablet Generic drug:  naproxen sodium Take 220 mg by mouth two (2) times daily (with meals). Calcium-Cholecalciferol (D3) 500 mg(1,250mg) -400 unit Tab Take  by mouth. co-enzyme Q-10 100 mg capsule Commonly known as:  CO Q-10 Take 1 capsule by mouth daily. lisinopril 5 mg tablet Commonly known as:  PRINIVIL, ZESTRIL  
TAKE ONE TABLET BY MOUTH ONCE DAILY  
  
 loperamide 2 mg tablet Commonly known as:  IMMODIUM Take 2 mg by mouth four (4) times daily as needed for Diarrhea. raNITIdine 150 mg tablet Commonly known as:  ZANTAC TAKE ONE TABLET BY MOUTH TWICE DAILY FOR  ACID  REFLUX  
  
 tolterodine ER 2 mg ER capsule Commonly known as:  DETROL-LA Take 1 Cap by mouth daily. traZODone 50 mg tablet Commonly known as:  DESYREL  
TAKE UP TO 3 PER NIGHT AS NEEDED BY MOUTH. Introducing [facility] HEALTH SERVICES! Dear Jenelle: Thank you for requesting a [employer] account. Our records indicate that you have previously registered for a [employer] account but its currently inactive. Please call our [employer] support line at [phone]. Additional Information If you have questions, please visit the Frequently Asked Questions section of the [employer] website at [email or web address] Remember, [employer] is NOT to be used for urgent needs. For medical emergencies, dial 911. Now available from your iPhone and Android! Please provide this summary of care documentation to your next provider. Your primary care clinician is listed as Vito Morgan. If you have any questions after today's visit, please call [phone].

## 2018-02-23 RX ORDER — RANITIDINE 150 MG/1
TABLET, FILM COATED ORAL
Qty: 60 TAB | Refills: 11 | Status: SHIPPED | OUTPATIENT
Start: 2018-02-23 | End: 2018-09-10 | Stop reason: SDUPTHER

## 2018-03-22 RX ORDER — LISINOPRIL 5 MG/1
TABLET ORAL
Qty: 90 TAB | Refills: 1 | Status: SHIPPED | OUTPATIENT
Start: 2018-03-22 | End: 2018-06-11 | Stop reason: ALTCHOICE

## 2018-03-22 NOTE — TELEPHONE ENCOUNTER
Last office visit:  1/29/18  Last filled:  Lisinopril 12/14/17 #90 X 1 refill  No changes  Has been out X 4 days  Follow 5/8/18 with Dr Jamarcus Gongora

## 2018-04-27 ENCOUNTER — PATIENT OUTREACH (OUTPATIENT)
Dept: INTERNAL MEDICINE CLINIC | Age: 78
End: 2018-04-27

## 2018-04-27 NOTE — PROGRESS NOTES
Hospital Discharge Follow-Up      Date/Time:  2018 5:36 PM    Patient was admitted to [facility] on 18 and discharged on 18 for L Femur Neck FX, HypoK+. The physician discharge summary was available at the time of outreach. Patient was discharged to SNF, [facility] [address] [place] [address]. Method of communication with provider :chart routing    Inpatient RRAT score: 5  Was this a readmission? no   Patient stated reason for the readmission: n/a    Nurse Navigator (NN) contacted the caregiver by telephone to perform post hospital discharge assessment. Verified name and  with caregiver as identifiers. Provided introduction to self, and explanation of the Nurse Navigator role. Reviewed discharge instructions and red flags with caregiver who verbalized understanding. Caregiver given an opportunity to ask questions and does not have any further questions or concerns at this time. The caregiver agrees to contact the PCP office for questions related to their healthcare. NN provided contact information for future reference. Summary of patients top problems:  1. Risk surgical site infection  2. Safety, risk for fall    Home Health orders at discharge:SNF -PT/OT L hip Fx. [address]: [facility] [address] [place] [address]  Date of initial visit: transferred 18    Durable Medical Equipment ordered/company:   Durable Medical Equipment received:    Advance Care Planning: not on file  Does patient have an Advance Directive:  no     Medication:     New Medications at Discharge:  Oxycodone-acetaminophen 5 mg by mouth 4 times daily as needed    Medication reconciliation was performed with caregiver, who verbalizes understanding of administration of home medications. There were no barriers to obtaining medications identified at this time.     Referral to Pharm D needed: no     Current Outpatient Prescriptions   Medication Sig    lisinopril (PRINIVIL, ZESTRIL) 5 mg tablet TAKE ONE TABLET BY MOUTH ONCE DAILY    raNITIdine (ZANTAC) 150 mg tablet TAKE ONE TABLET BY MOUTH TWICE DAILY FOR  ACID  REFLUX    naproxen sodium (ALEVE) 220 mg tablet Take 220 mg by mouth two (2) times daily (with meals).  loperamide (IMMODIUM) 2 mg tablet Take 2 mg by mouth four (4) times daily as needed for Diarrhea.  traZODone (DESYREL) 50 mg tablet TAKE UP TO 3 PER NIGHT AS NEEDED BY MOUTH.  alendronate (FOSAMAX) 70 mg tablet Take 1 Tab by mouth every seven (7) days.  tolterodine ER (DETROL-LA) 2 mg ER capsule Take 1 Cap by mouth daily.  Calcium-Cholecalciferol, D3, 500 mg(1,250mg) -400 unit tab Take  by mouth.  co-enzyme Q-10 (CO Q-10) 100 mg capsule Take 1 capsule by mouth daily. No current facility-administered medications for this visit. There are no discontinued medications.     PCP/Specialist follow up: Future Appointments  Date Time Provider Giovani Hummel   5/8/2018 9:30 AM Kaylen Sanfrod MD [facility]

## 2018-04-30 RX ORDER — PROMETHAZINE HYDROCHLORIDE 12.5 MG/1
TABLET ORAL
COMMUNITY
End: 2018-06-11 | Stop reason: ALTCHOICE

## 2018-04-30 RX ORDER — ASPIRIN 325 MG
325 TABLET ORAL DAILY
COMMUNITY
End: 2018-06-11 | Stop reason: DRUGHIGH

## 2018-04-30 RX ORDER — OXYCODONE AND ACETAMINOPHEN 5; 325 MG/1; MG/1
1 TABLET ORAL
COMMUNITY
End: 2018-06-11 | Stop reason: ALTCHOICE

## 2018-04-30 RX ORDER — ACETAMINOPHEN 325 MG/1
TABLET ORAL
COMMUNITY
End: 2018-06-11 | Stop reason: ALTCHOICE

## 2018-04-30 RX ORDER — OXYBUTYNIN CHLORIDE 5 MG/1
5 TABLET, EXTENDED RELEASE ORAL DAILY
COMMUNITY
End: 2018-06-11 | Stop reason: ALTCHOICE

## 2018-04-30 RX ORDER — CHOLECALCIFEROL (VITAMIN D3) 125 MCG
CAPSULE ORAL
COMMUNITY
End: 2018-12-03 | Stop reason: SDUPTHER

## 2018-05-09 ENCOUNTER — PATIENT OUTREACH (OUTPATIENT)
Dept: INTERNAL MEDICINE CLINIC | Age: 78
End: 2018-05-09

## 2018-05-16 ENCOUNTER — PATIENT OUTREACH (OUTPATIENT)
Dept: INTERNAL MEDICINE CLINIC | Age: 78
End: 2018-05-16

## 2018-05-24 ENCOUNTER — TELEPHONE (OUTPATIENT)
Dept: INTERNAL MEDICINE CLINIC | Age: 78
End: 2018-05-24

## 2018-05-24 ENCOUNTER — PATIENT OUTREACH (OUTPATIENT)
Dept: INTERNAL MEDICINE CLINIC | Age: 78
End: 2018-05-24

## 2018-05-24 NOTE — TELEPHONE ENCOUNTER
Spoke with Carlos Haynes from [facility] - patient is home from the [place] X 1 week - nurse has visited patient for the past 2 days and her blood pressure has been low - 94/68 range - patient has c/o feeling \"woozy\" or lightheaded - she recommends that patient hold her blood pressure medication until more normal readings are seen - patient will have her blood pressure daily and readings will be monitored - follow up is scheduled with Dr Meka Farmer on 6/11/18  Valeria An LPN  5/01/4878  7:17 PM

## 2018-05-25 NOTE — PROGRESS NOTES
Hospital Discharge Follow-Up      Date/Time:  2018 2:42 PM    Patient was admitted to [place] [address] on  transferred to [facility] on -, discharged home. The physician discharge summary was not available at the time of outreach. Patient was contacted within 8 business days of discharge. Top Challenges reviewed with the provider    Pt stopping some medication reports she taking too many. . Please review list, f/u appt. 18. Method of communication with provider :chart routing    Inpatient RRAT score: 5  Was this a readmission? no   Patient stated reason for the readmission: n/a    Nurse Navigator (NN) contacted the patient by telephone to perform post hospital discharge assessment. Verified name and  with patient as identifiers. Provided introduction to self, and explanation of the Nurse Navigator role. Reviewed discharge instructions and red flags with patient who verbalized understanding. Patient given an opportunity to ask questions and does not have any further questions or concerns at this time. The patient agrees to contact the PCP office for questions related to their healthcare. NN provided contact information for future reference. Summary of patient's top problems:  1. Prevention of recurrent resp. infection Dx. Acute Bronchitis ,she is currently on prednisone, flonase and tussi dm for the sx  2.  Prevention of fall s/p L THR    Home Health orders at discharge: Home with [facility] PT/OT, SN medical management  Home Health company:[facility]  Date of initial visit:     Durable Medical Equipment ordered/company: walker  Durable Medical Equipment received: yes    Barriers to care? none    Advance Care Planning:   Does patient have an Advance Directive:  discuss at next out reach     Medication:   New Medications at Discharge:   Changed Medications at Discharge:  Discontinued Medications at Discharge:     Medication reconciliation was performed with patient, who verbalizes understanding of administration of home medications. There were no barriers to obtaining medications identified at this time. Referral to Pharm D needed: no     Current Outpatient Prescriptions   Medication Sig    acetaminophen (TYLENOL) 325 mg tablet Take  by mouth every four (4) hours as needed for Pain.  cholecalciferol, vitamin D3, (VITAMIN D3) 2,000 unit tab Take  by mouth.  lisinopril (PRINIVIL, ZESTRIL) 5 mg tablet TAKE ONE TABLET BY MOUTH ONCE DAILY    raNITIdine (ZANTAC) 150 mg tablet TAKE ONE TABLET BY MOUTH TWICE DAILY FOR  ACID  REFLUX    naproxen sodium (ALEVE) 220 mg tablet Take 220 mg by mouth two (2) times daily (with meals).  traZODone (DESYREL) 50 mg tablet TAKE UP TO 3 PER NIGHT AS NEEDED BY MOUTH.  Calcium-Cholecalciferol, D3, 500 mg(1,250mg) -400 unit tab Take  by mouth.  promethazine (PHENERGAN) 12.5 mg tablet Take  by mouth every six (6) hours as needed for Nausea.  aspirin (ASPIRIN) 325 mg tablet Take 325 mg by mouth daily.  oxyCODONE-acetaminophen (PERCOCET) 5-325 mg per tablet Take 1 Tab by mouth four (4) times daily as needed for Pain.  oxybutynin chloride XL (DITROPAN XL) 5 mg CR tablet Take 5 mg by mouth daily.  loperamide (IMMODIUM) 2 mg tablet Take 2 mg by mouth four (4) times daily as needed for Diarrhea.  alendronate (FOSAMAX) 70 mg tablet Take 1 Tab by mouth every seven (7) days.  tolterodine ER (DETROL-LA) 2 mg ER capsule Take 1 Cap by mouth daily.  co-enzyme Q-10 (CO Q-10) 100 mg capsule Take 1 capsule by mouth daily. No current facility-administered medications for this visit. There are no discontinued medications. PCP/Specialist follow up:   Future Appointments  Date Time Provider Giovani Hummel   6/11/2018 9:30 AM MD Peter Marquis   6/1/18 @ 9:20 am  1 [facility],[address] Establish PCP relationships and regularly scheduled appointments. 4/27 Patient discharged to SNF;  [facility] verified by staff Mehul Gonzalez) Physician, Antonio Prasad , medications reviewed. Staff reports pt. is doing well. -[address]    5/25 NN reminded pt of her. Future Appointments:  Date Time Provider Giovani Hummel   6/11/2018 9:30 AM MD Peter Oneil   6/1/18 @ 9:20 am  Mar Primrose    NN reminded pt.,follow-up care is a key part of your treatment and safety. Be sure to make and go to all appointments, and call your doctor if you are having problems. It's also a good idea to know your test results and keep a list of the medicines to take to your f/u appts. Pt.verbalizes understanding, will take list medications to appts. -[address]          Knowledge and adherence of prescribed medication (ie. action, side effects, missed dose, etc.). 4/27 Patient discharged to SNF; [facility], verified by staff Kelly Robertson(nurse). -[address]    5/25  Patient will understand general knowledge of medications, s/e and take meds as prescribed. NN reviewed medications list with pt.reports taking medication as directed. Reports she able to fill pill boxes,the [place] nurse checks her meds, reports she is  familiar with medications and why she is taking themNN encourage pt. to call office if resp.symptoms doesn't seem to improve, call any fever or with any questions or concerns. Pt. verbalizes understanding will call if symptoms not improving. NN contact information provided. -[place] [address] resources in place to maintain patient in the community (ie., home health, equipment, DME, refer to, etc.)            4/27 Patient discharged to SNF; [facility], verified by staff Kelly Robertson(nurse). -[address]     5/9 Patient remains resident at [place], [place] will follow. -[address]    5/16 Patient remains resident at [place], [place] will continue to follow. -[address]    5/24 Staff Nurse ([facility]) reports pt.was d/c home on 5/18/18. NN spoke to d/c [facility] Karina requesting send copy d/c instructions. -[address]    5/25 NN call ([facility]) spoke with [facility] VANE WIGGINS requesting send copy pt.discharge summary. -[address]    5/25 Faxed received from [facility], d/c summary reviewed with pt. Reports she is receiving HomeHealth from [facility], PT 3 times a week, SW, SN 2 times a week, pt. declined CNA assistance. Pt.lives at home alone, has a son nearby and daughter lives not too far away she reports. Pt.reports she is doing well with PT using her walker, NN will reminded pt. safety concerns,encourage  pt to wear proper  fitting shoes, be careful walking across rugs, use proper lighting at night prevent tripping over things may cause falls. Patient verbalizes understanding-AUGUSTINA

## 2018-06-06 ENCOUNTER — PATIENT OUTREACH (OUTPATIENT)
Dept: INTERNAL MEDICINE CLINIC | Age: 78
End: 2018-06-06

## 2018-06-06 NOTE — ACP (ADVANCE CARE PLANNING)
NN spoke with pt briefly ACP, offered send booklet \"Your Right to Decide\" to review  and fill out paperwork if desired. Patient declined information at this time reports her children already is aware of her wishes. -[address]

## 2018-06-08 RX ORDER — TOLTERODINE 2 MG/1
CAPSULE, EXTENDED RELEASE ORAL
Qty: 90 CAP | Refills: 1 | Status: SHIPPED | OUTPATIENT
Start: 2018-06-08 | End: 2018-09-10 | Stop reason: SDUPTHER

## 2018-06-08 NOTE — TELEPHONE ENCOUNTER
Last office visit:  1/29/18  Last filled:   Tolterodine (Detrol LA) 5/15/17 #90 X 3 refills  No changes  followup 6/11/18 with Dr Akilah Busch

## 2018-06-11 ENCOUNTER — OFFICE VISIT (OUTPATIENT)
Dept: INTERNAL MEDICINE CLINIC | Age: 78
End: 2018-06-11

## 2018-06-11 VITALS
SYSTOLIC BLOOD PRESSURE: 136 MMHG | DIASTOLIC BLOOD PRESSURE: 64 MMHG | WEIGHT: 131 LBS | RESPIRATION RATE: 16 BRPM | HEIGHT: 61 IN | TEMPERATURE: 97.8 F | BODY MASS INDEX: 24.73 KG/M2 | HEART RATE: 87 BPM | OXYGEN SATURATION: 97 %

## 2018-06-11 DIAGNOSIS — S72.002D CLOSED FRACTURE OF LEFT HIP WITH ROUTINE HEALING, SUBSEQUENT ENCOUNTER: Primary | ICD-10-CM

## 2018-06-11 DIAGNOSIS — M17.12 PRIMARY OSTEOARTHRITIS OF LEFT KNEE: ICD-10-CM

## 2018-06-11 DIAGNOSIS — M81.0 OSTEOPOROSIS WITHOUT CURRENT PATHOLOGICAL FRACTURE, UNSPECIFIED OSTEOPOROSIS TYPE: ICD-10-CM

## 2018-06-11 DIAGNOSIS — N39.41 URGE INCONTINENCE OF URINE: ICD-10-CM

## 2018-06-11 RX ORDER — ALENDRONATE SODIUM 70 MG/1
70 TABLET ORAL
Qty: 5 TAB | Refills: 11 | Status: SHIPPED | OUTPATIENT
Start: 2018-06-11 | End: 2018-09-10 | Stop reason: SDUPTHER

## 2018-06-11 RX ORDER — GUAIFENESIN 100 MG/5ML
81 LIQUID (ML) ORAL DAILY
COMMUNITY
End: 2018-07-25 | Stop reason: ALTCHOICE

## 2018-06-11 NOTE — PROGRESS NOTES
HISTORY OF PRESENT ILLNESS  Emmanuelle Taveras is a 65 y.o. female. Fall   The history is provided by the patient. Incident onset: back in April. The fall occurred while walking (tripped over extension cord). She fell from a height of 3 - 5 ft. She landed on hard floor. The point of impact was the left hip. The pain is present in the left hip. She was not ambulatory at the scene. There was no drug use involved in the accident. There was no alcohol use involved in the accident. Associated symptoms include visual change. Pertinent negatives include no loss of consciousness. The risk factors include being elderly. She has tried NSAIDs (had hip repair surgery) for the symptoms. The treatment provided moderate relief. Finished PT, now doing on her own. Seeing Lauren Olmedo later today. Sent to the [facility] for rehab now back home. Drives able to get to kitchen and BR but urine incontinence an issue  Still sees ortho  BP med stopped when BP OK off med    Patient Active Problem List   Diagnosis Code    Hyperlipidemia E78.5    Hypertriglyceridemia E78.1    Osteoporosis M81.0    Pre-diabetes R73.03    Osteoarthritis, knee M17.10    Diverticulosis K57.90    Essential hypertension I10     Current Outpatient Prescriptions   Medication Sig Dispense Refill    aspirin 81 mg chewable tablet Take 81 mg by mouth daily.  alendronate (FOSAMAX) 70 mg tablet Take 1 Tab by mouth every seven (7) days. 5 Tab 11    tolterodine ER (DETROL-LA) 2 mg ER capsule TAKE ONE CAPSULE BY MOUTH ONCE DAILY 90 Cap 1    cholecalciferol, vitamin D3, (VITAMIN D3) 2,000 unit tab Take  by mouth.  raNITIdine (ZANTAC) 150 mg tablet TAKE ONE TABLET BY MOUTH TWICE DAILY FOR  ACID  REFLUX 60 Tab 11    naproxen sodium (ALEVE) 220 mg tablet Take 220 mg by mouth two (2) times daily (with meals).  traZODone (DESYREL) 50 mg tablet TAKE UP TO 3 PER NIGHT AS NEEDED BY MOUTH. (Patient taking differently: 50 mg nightly.  Try to stop wean gradually) 90 Tab 5    Calcium-Cholecalciferol, D3, 500 mg(1,250mg) -400 unit tab Take  by mouth.  co-enzyme Q-10 (CO Q-10) 100 mg capsule Take 1 capsule by mouth daily. 90 capsule 1     Allergies   Allergen Reactions    Naprosyn [Naproxen] Nausea Only     Social History     Social History    Marital status:      Spouse name: N/A    Number of children: 3    Years of education: N/A     Occupational History          retired semi     Social History Main Topics    Smoking status: Former Smoker     Packs/day: 1.00     Years: 0.00     Quit date: 1990    Smokeless tobacco: Never Used    Alcohol use No    Drug use: No    Sexual activity: No     Other Topics Concern    Not on file     Social History Narrative    Son lives with her, son passed away  2016 of an aneurysm, now lives alone       Review of Systems   Gastrointestinal: Negative for blood in stool. Genitourinary: Negative for dysuria. Musculoskeletal: Positive for falls. Neurological: Negative for loss of consciousness. Physical Exam  Visit Vitals    /64 (BP 1 Location: Left arm, BP Patient Position: Sitting)    Pulse 87    Temp 97.8 °F (36.6 °C) (Oral)    Resp 16    Ht 5' 1\" (1.549 m)    Wt 131 lb (59.4 kg)    SpO2 97%    BMI 24.75 kg/m2     WD WN female NAD  Heart RRR without murmers clicks or rubs  Lungs CTA  Abdo soft nontender  Ext no edema  Walks slowly with asst of cane  ASSESSMENT and PLAN  Encounter Diagnoses   Name Primary?     Closed fracture of left hip with routine healing, subsequent encounter Yes    Osteoporosis without current pathological fracture, unspecified osteoporosis type     Urge incontinence of urine     Primary osteoarthritis of left knee      Orders Placed This Encounter    aspirin 81 mg chewable tablet    alendronate (FOSAMAX) 70 mg tablet   dec home eval for falls hazards, see opto to check vision, seemds to be making a good recovery from hip Fx surgery encouraged cont PT and cont movement. Follow-up Disposition:  Return in about 3 months (around 9/11/2018) for routine follow up.

## 2018-06-11 NOTE — MR AVS SNAPSHOT
[address] [address] [facility] 
[phone] Patient: Aida Rivas MRN: [id number] TKW:[id number] Visit Information Date & Time Provider Department Dept. Phone Encounter #  
 6/11/2018  9:30 AM Rocío Chapin MD [facility] [place] [id number] Follow-up Instructions Return in about 3 months (around 9/11/2018) for routine follow up. Upcoming Health Maintenance Date Due Influenza Age 5 to Adult 8/1/2018 GLAUCOMA SCREENING Q2Y 6/9/2019 DTaP/Tdap/Td series (2 - Td) 2/6/2027 Allergies as of 6/11/2018  Review Complete On: 6/11/2018 By: Rocío Chapin MD  
  
 Severity Noted Reaction Type Reactions Naprosyn [Naproxen]  05/18/2015    Nausea Only Current Immunizations  Reviewed on 2/6/2017 Name Date H1N1 FLU VACCINE 3/24/2010 Influenza High Dose Vaccine PF 9/11/2017 Influenza Vaccine 8/20/2014, 9/11/2013 Influenza Vaccine Orlean Meres) 10/1/2015 Influenza Vaccine (Quad) PF 8/25/2016 Influenza Vaccine Split 9/13/2012, 10/6/2011 Influenza Vaccine Whole 10/7/2009 Pneumococcal Conjugate (PCV-13) 9/30/2016 Tdap 2/6/2017 ZZZ-RETIRED (DO NOT USE) Pneumococcal Vaccine (Unspecified Type) 4/28/2010 Zoster Vaccine, Live 4/8/2015 Not reviewed this visit You Were Diagnosed With   
  
 Codes Comments Closed fracture of left hip with routine healing, subsequent encounter    -  Primary ICD-10-CM: U90.073M ICD-9-CM: V54.13 Osteoporosis without current pathological fracture, unspecified osteoporosis type     ICD-10-CM: M81.0 ICD-9-CM: 733.00 Urge incontinence of urine     ICD-10-CM: N39.41 
ICD-9-CM: 788.31 Vitals BP Pulse Temp Resp Height(growth percentile) Weight(growth percentile) 136/64 (BP 1 Location: Left arm, BP Patient Position: Sitting) 87 97.8 °F (36.6 °C) (Oral) 16 5' 1\" (1.549 m) 131 lb (59.4 kg) SpO2 BMI OB Status Smoking Status 97% 24.75 kg/m2 Hysterectomy Former Smoker BMI and BSA Data Body Mass Index Body Surface Area 24.75 kg/m 2 1.6 m 2 Preferred Pharmacy Pharmacy Name Phone [facility] [address] [facility] [address] [phone] Your Updated Medication List  
  
   
This list is accurate as of 6/11/18 10:36 AM.  Always use your most recent med list.  
  
  
  
  
 alendronate 70 mg tablet Commonly known as:  FOSAMAX Take 1 Tab by mouth every seven (7) days. ALEVE 220 mg tablet Generic drug:  naproxen sodium Take 220 mg by mouth two (2) times daily (with meals). aspirin 81 mg chewable tablet Take 81 mg by mouth daily. Calcium-Cholecalciferol (D3) 500 mg(1,250mg) -400 unit Tab Take  by mouth. co-enzyme Q-10 100 mg capsule Commonly known as:  CO Q-10 Take 1 capsule by mouth daily. raNITIdine 150 mg tablet Commonly known as:  ZANTAC TAKE ONE TABLET BY MOUTH TWICE DAILY FOR  ACID  REFLUX  
  
 tolterodine ER 2 mg ER capsule Commonly known as:  DETROL-LA  
TAKE ONE CAPSULE BY MOUTH ONCE DAILY  
  
 traZODone 50 mg tablet Commonly known as:  DESYREL  
TAKE UP TO 3 PER NIGHT AS NEEDED BY MOUTH. VITAMIN D3 2,000 unit Tab Generic drug:  cholecalciferol (vitamin D3) Take  by mouth. Prescriptions Sent to Pharmacy Refills  
 alendronate (FOSAMAX) 70 mg tablet 11 Sig: Take 1 Tab by mouth every seven (7) days. Class: Normal  
 Pharmacy: [facility] [address] TRACY BILL[facility] [address] [facility] [facility] Ph #: [phone] Route: Oral  
  
Follow-up Instructions Return in about 3 months (around 9/11/2018) for routine follow up. Introducing [facility]! [facility] introduces Brigade patient portal. Now you can access parts of your medical record, email your doctor's office, and request medication refills online. 1. In your internet browser, go to [email or web address] 2. Click on the First Time User? Click Here link in the Sign In box. You will see the New Member Sign Up page. 3. Enter your [employer] Access Code exactly as it appears below. You will not need to use this code after youve completed the sign-up process. If you do not sign up before the expiration date, you must request a new code. · [employer] Access Code: [email or web address] Expires: 9/9/2018  9:29 AM 
 
4. Enter the last four digits of your Social Security Number (xxxx) and Date of Birth (mm/dd/yyyy) as indicated and click Submit. You will be taken to the next sign-up page. 5. Create a [employer] ID. This will be your [employer] login ID and cannot be changed, so think of one that is secure and easy to remember. 6. Create a [employer] password. You can change your password at any time. 7. Enter your Password Reset Question and Answer. This can be used at a later time if you forget your password. 8. Enter your e-mail address. You will receive e-mail notification when new information is available in [address] 9. Click Sign Up. You can now view and download portions of your medical record. 10. Click the Download Summary menu link to download a portable copy of your medical information. If you have questions, please visit the Frequently Asked Questions section of the [employer] website. Remember, [employer] is NOT to be used for urgent needs. For medical emergencies, dial 911. Now available from your iPhone and Android! Please provide this summary of care documentation to your next provider. Your primary care clinician is listed as Arabella Calixto. If you have any questions after today's visit, please call [phone].

## 2018-06-11 NOTE — PROGRESS NOTES
Chief Complaint   Patient presents with    Hypertension     follow up     I have reviewed the patient's medical history in detail and updated the computerized patient record. Health Maintenance reviewed. 1. Have you been to the ER, urgent care clinic since your last visit? Hospitalized since your last visit? yes    2. Have you seen or consulted any other health care providers outside of the [facility] since your last visit? Include any pap smears or colon screening. [facility]     Encouraged pt to discuss pt's wishes with spouse/partner/family and bring them in the next appt to follow thru with the Advanced Directive    Fall Risk Assessment, last 12 mths 6/11/2018   Able to walk? Yes   Fall in past 12 months? Yes   Fall with injury? Yes   Number of falls in past 12 months 1   Fall Risk Score 2       PHQ over the last two weeks 6/11/2018   Little interest or pleasure in doing things Several days   Feeling down, depressed or hopeless Several days   Total Score PHQ 2 2       Abuse Screening Questionnaire 1/29/2018   Do you ever feel afraid of your partner? N   Are you in a relationship with someone who physically or mentally threatens you? N   Is it safe for you to go home?  Y       ADL Assessment 1/29/2018   Feeding yourself No Help Needed   Getting from bed to chair No Help Needed   Getting dressed No Help Needed   Bathing or showering No Help Needed   Walk across the room (includes cane/walker) No Help Needed   Using the telphone No Help Needed   Taking your medications No Help Needed   Preparing meals No Help Needed   Managing money (expenses/bills) No Help Needed   Moderately strenuous housework (laundry) No Help Needed   Shopping for personal items (toiletries/medicines) No Help Needed   Shopping for groceries No Help Needed   Driving No Help Needed   Climbing a flight of stairs No Help Needed   Getting to places beyond walking distances No Help Needed

## 2018-06-18 ENCOUNTER — PATIENT OUTREACH (OUTPATIENT)
Dept: INTERNAL MEDICINE CLINIC | Age: 78
End: 2018-06-18

## 2018-06-19 NOTE — PROGRESS NOTES
Patient has had no ED visits or hospitalizations within the past 30 days. Attended follow up RAFAL CHEUNG appointment with Dr. Aida Meeks on 6/11/18. Goals met. No other needs identified to Nurse Navigator at this time. Resolving SHERWIN episode. -[address]

## 2018-07-11 ENCOUNTER — OFFICE VISIT (OUTPATIENT)
Dept: INTERNAL MEDICINE CLINIC | Age: 78
End: 2018-07-11

## 2018-07-11 VITALS
BODY MASS INDEX: 24.92 KG/M2 | DIASTOLIC BLOOD PRESSURE: 70 MMHG | SYSTOLIC BLOOD PRESSURE: 132 MMHG | OXYGEN SATURATION: 98 % | HEART RATE: 86 BPM | RESPIRATION RATE: 16 BRPM | TEMPERATURE: 98.2 F | HEIGHT: 61 IN | WEIGHT: 132 LBS

## 2018-07-11 DIAGNOSIS — R10.9 FLANK PAIN: Primary | ICD-10-CM

## 2018-07-11 DIAGNOSIS — N39.0 URINARY TRACT INFECTION WITHOUT HEMATURIA, SITE UNSPECIFIED: ICD-10-CM

## 2018-07-11 DIAGNOSIS — I10 ESSENTIAL HYPERTENSION: ICD-10-CM

## 2018-07-11 LAB
BILIRUB UR QL STRIP: NEGATIVE
GLUCOSE UR-MCNC: NEGATIVE MG/DL
KETONES P FAST UR STRIP-MCNC: NEGATIVE MG/DL
PH UR STRIP: 5.5 [PH] (ref 4.6–8)
PROT UR QL STRIP: NEGATIVE
SP GR UR STRIP: 1.02 (ref 1–1.03)
UA UROBILINOGEN AMB POC: NORMAL (ref 0.2–1)
URINALYSIS CLARITY POC: NORMAL
URINALYSIS COLOR POC: YELLOW
URINE BLOOD POC: NORMAL
URINE LEUKOCYTES POC: NORMAL
URINE NITRITES POC: NEGATIVE

## 2018-07-11 RX ORDER — NITROFURANTOIN 25; 75 MG/1; MG/1
100 CAPSULE ORAL 2 TIMES DAILY
Qty: 14 CAP | Refills: 0 | Status: SHIPPED | OUTPATIENT
Start: 2018-07-11 | End: 2018-07-18

## 2018-07-11 NOTE — PROGRESS NOTES
HISTORY OF PRESENT ILLNESS  Anamaria Roman is a 65 y.o. female. Side Pain   The history is provided by the patient. This is a new problem. The current episode started yesterday. The problem occurs hourly. The problem has not changed since onset. Pertinent negatives include no chest pain, no abdominal pain and no shortness of breath. Associated symptoms comments: Right flank pain right ribs. Treatments tried: pain patch. The treatment provided mild relief.   pain returned, min c/o dysuria, no hematuria. Patient Active Problem List   Diagnosis Code    Hyperlipidemia E78.5    Hypertriglyceridemia E78.1    Osteoporosis M81.0    Pre-diabetes R73.03    Osteoarthritis, knee M17.10    Diverticulosis K57.90    Essential hypertension I10     Allergies   Allergen Reactions    Naprosyn [Naproxen] Nausea Only     Current Outpatient Prescriptions   Medication Sig Dispense Refill    aspirin 81 mg chewable tablet Take 81 mg by mouth daily.  alendronate (FOSAMAX) 70 mg tablet Take 1 Tab by mouth every seven (7) days. 5 Tab 11    tolterodine ER (DETROL-LA) 2 mg ER capsule TAKE ONE CAPSULE BY MOUTH ONCE DAILY 90 Cap 1    cholecalciferol, vitamin D3, (VITAMIN D3) 2,000 unit tab Take  by mouth.  raNITIdine (ZANTAC) 150 mg tablet TAKE ONE TABLET BY MOUTH TWICE DAILY FOR  ACID  REFLUX 60 Tab 11    naproxen sodium (ALEVE) 220 mg tablet Take 220 mg by mouth two (2) times daily (with meals).  traZODone (DESYREL) 50 mg tablet TAKE UP TO 3 PER NIGHT AS NEEDED BY MOUTH. (Patient taking differently: 50 mg nightly. Try to stop wean gradually) 90 Tab 5    Calcium-Cholecalciferol, D3, 500 mg(1,250mg) -400 unit tab Take  by mouth.  co-enzyme Q-10 (CO Q-10) 100 mg capsule Take 1 capsule by mouth daily. 90 capsule 1         Review of Systems   Respiratory: Negative for shortness of breath. Cardiovascular: Negative for chest pain. Gastrointestinal: Negative for abdominal pain, nausea and vomiting. Genitourinary: Negative for dysuria and hematuria. Musculoskeletal: Negative for falls. Skin: Negative for rash. Physical Exam  Visit Vitals    /70    Pulse 86    Temp 98.2 °F (36.8 °C) (Oral)    Resp 16    Ht 5' 1\" (1.549 m)    Wt 132 lb (59.9 kg)    SpO2 98%    BMI 24.94 kg/m2       WD WN female NAD  Heart RRR without murmers clicks or rubs  Lungs CTA  Abdo soft nontender  Ext no edema  Flank nl no rash  ASSESSMENT and PLAN  Encounter Diagnoses   Name Primary?  Flank pain Yes    Essential hypertension     Urinary tract infection without hematuria, site unspecified      Orders Placed This Encounter    CULTURE, URINE    AMB POC URINALYSIS DIP STICK AUTO W/O MICRO    nitrofurantoin, macrocrystal-monohydrate, (MACROBID) 100 mg capsule     HTN satble  ? UTI as cause as flank pain  Follow-up Disposition:  Return if symptoms worsen or fail to improve.

## 2018-07-11 NOTE — PROGRESS NOTES
C/o pain in right side between back and abdomen X 2 days - worse this AM a little better now Aldaria BurnsISRAEL granados  7/09/3522  0:97 PM  Fall Risk Assessment, last 12 mths 6/11/2018   Able to walk? Yes   Fall in past 12 months? Yes   Fall with injury? Yes   Number of falls in past 12 months 1   Fall Risk Score 2       PHQ over the last two weeks 6/11/2018   Little interest or pleasure in doing things Several days   Feeling down, depressed or hopeless Several days   Total Score PHQ 2 2       Abuse Screening Questionnaire 1/29/2018   Do you ever feel afraid of your partner? N   Are you in a relationship with someone who physically or mentally threatens you? N   Is it safe for you to go home?  Y       ADL Assessment 1/29/2018   Feeding yourself No Help Needed   Getting from bed to chair No Help Needed   Getting dressed No Help Needed   Bathing or showering No Help Needed   Walk across the room (includes cane/walker) No Help Needed   Using the telphone No Help Needed   Taking your medications No Help Needed   Preparing meals No Help Needed   Managing money (expenses/bills) No Help Needed   Moderately strenuous housework (laundry) No Help Needed   Shopping for personal items (toiletries/medicines) No Help Needed   Shopping for groceries No Help Needed   Driving No Help Needed   Climbing a flight of stairs No Help Needed   Getting to places beyond walking distances No Help Needed

## 2018-07-13 LAB — BACTERIA UR CULT: ABNORMAL

## 2018-07-14 NOTE — PROGRESS NOTES
Send normal/stable results letter. Your results are normal/stable. If not signed up, consider getting my chart to get your results on-line. We can help you to sign up.   Finish antibiotic for your urinary tract infection

## 2018-07-23 ENCOUNTER — TELEPHONE (OUTPATIENT)
Dept: INTERNAL MEDICINE CLINIC | Age: 78
End: 2018-07-23

## 2018-07-23 NOTE — TELEPHONE ENCOUNTER
----- Message from Estrella Galicia sent at 7/23/2018  7:59 AM EDT -----  Regarding: Dr. Washington Smith   Pt would like to speak to Anthony Arce. Reason not disclosure. [phone].

## 2018-07-25 ENCOUNTER — OFFICE VISIT (OUTPATIENT)
Dept: INTERNAL MEDICINE CLINIC | Age: 78
End: 2018-07-25

## 2018-07-25 VITALS
BODY MASS INDEX: 24.92 KG/M2 | OXYGEN SATURATION: 98 % | DIASTOLIC BLOOD PRESSURE: 78 MMHG | SYSTOLIC BLOOD PRESSURE: 170 MMHG | TEMPERATURE: 97.2 F | WEIGHT: 132 LBS | HEIGHT: 61 IN | HEART RATE: 78 BPM | RESPIRATION RATE: 18 BRPM

## 2018-07-25 DIAGNOSIS — R30.0 DYSURIA: ICD-10-CM

## 2018-07-25 DIAGNOSIS — T78.40XA ALLERGIC REACTION TO DRUG, INITIAL ENCOUNTER: Primary | ICD-10-CM

## 2018-07-25 DIAGNOSIS — I10 ESSENTIAL HYPERTENSION: ICD-10-CM

## 2018-07-25 LAB
BILIRUB UR QL STRIP: NEGATIVE
GLUCOSE UR-MCNC: NEGATIVE MG/DL
KETONES P FAST UR STRIP-MCNC: NEGATIVE MG/DL
PH UR STRIP: 5.5 [PH] (ref 4.6–8)
PROT UR QL STRIP: NEGATIVE
SP GR UR STRIP: 1.02 (ref 1–1.03)
UA UROBILINOGEN AMB POC: NORMAL (ref 0.2–1)
URINALYSIS CLARITY POC: CLEAR
URINALYSIS COLOR POC: YELLOW
URINE BLOOD POC: NEGATIVE
URINE LEUKOCYTES POC: NEGATIVE
URINE NITRITES POC: NEGATIVE

## 2018-07-25 RX ORDER — TRIAMCINOLONE ACETONIDE 1 MG/G
CREAM TOPICAL 2 TIMES DAILY
Qty: 30 G | Refills: 1 | Status: SHIPPED | OUTPATIENT
Start: 2018-07-25 | End: 2018-07-30 | Stop reason: ALTCHOICE

## 2018-07-25 RX ORDER — HYDROXYZINE PAMOATE 25 MG/1
25 CAPSULE ORAL
Qty: 30 CAP | Refills: 0 | Status: SHIPPED | OUTPATIENT
Start: 2018-07-25 | End: 2018-07-30 | Stop reason: ALTCHOICE

## 2018-07-25 RX ORDER — IBUPROFEN 200 MG
TABLET ORAL
COMMUNITY
End: 2018-07-25 | Stop reason: ALTCHOICE

## 2018-07-25 NOTE — MR AVS SNAPSHOT
[address] 1081 [facility] 
[phone] Patient: Jaime Dsouza MRN: [id number] [facility]:8/60/3786 Visit Information Date & Time Provider Department Dept. Phone Encounter #  
 7/25/2018 11:45 AM MD Marbella Whitmore Dr [id number] Follow-up Instructions Return in about 5 days (around 7/30/2018) for routine follow up. Your Appointments 7/25/2018 11:45 AM  
ACUTE CARE with MD Mayelin Whitmore [address] [place] [address]) Appt Note: rash $0 cp ls 7/25/18  
 [address] [place] [address]. [address] [place] [address]  
[phone]  
  
   
 [address] [place] [address] [address] [facility]  
  
    
 9/10/2018 10:45 AM  
ACUTE CARE with MD Mayelin Whitmore [address] [place] [address]) Appt Note: f/u on bp $0 cp ls 6/11/18  
 [address] [place] [address]. [address] 3318 [facility]  
[phone] Upcoming Health Maintenance Date Due Influenza Age 5 to Adult 8/1/2018 GLAUCOMA SCREENING Q2Y 6/9/2019 DTaP/Tdap/Td series (2 - Td) 2/6/2027 Allergies as of 7/25/2018  Review Complete On: 7/25/2018 By: Kacy Ogden MD  
  
 Severity Noted Reaction Type Reactions Nsaids (Non-steroidal Anti-inflammatory Drug) Medium 07/25/2018    Rash Naprosyn [Naproxen]  05/18/2015    Nausea Only Current Immunizations  Reviewed on 2/6/2017 Name Date H1N1 FLU VACCINE 3/24/2010 Influenza High Dose Vaccine PF 9/11/2017 Influenza Vaccine 8/20/2014, 9/11/2013 Influenza Vaccine Donnita Saltness) 10/1/2015 Influenza Vaccine (Quad) PF 8/25/2016 Influenza Vaccine Split 9/13/2012, 10/6/2011 Influenza Vaccine Whole 10/7/2009 Pneumococcal Conjugate (PCV-13) 9/30/2016 Tdap 2/6/2017 ZZZ-RETIRED (DO NOT USE) Pneumococcal Vaccine (Unspecified Type) 4/28/2010 Zoster Vaccine, Live 4/8/2015 Not reviewed this visit You Were Diagnosed With   
  
 Codes Comments Allergic reaction to drug, initial encounter    -  Primary ICD-10-CM: T78.40XA ICD-9-CM: 995.27 Dysuria     ICD-10-CM: R30.0 ICD-9-CM: 788.1 Essential hypertension     ICD-10-CM: I10 
ICD-9-CM: 401.9 Vitals BP Pulse Temp Resp Height(growth percentile) Weight(growth percentile) 170/78 78 97.2 °F (36.2 °C) (Oral) 18 5' 1\" (1.549 m) 132 lb (59.9 kg) SpO2 BMI OB Status Smoking Status 98% 24.94 kg/m2 Hysterectomy Former Smoker Vitals History BMI and BSA Data Body Mass Index Body Surface Area 24.94 kg/m 2 1.61 m 2 Preferred Pharmacy Pharmacy Name Phone [facility] [address] [facility] [phone] Your Updated Medication List  
  
   
This list is accurate as of 7/25/18 10:44 AM.  Always use your most recent med list.  
  
  
  
  
 alendronate 70 mg tablet Commonly known as:  FOSAMAX Take 1 Tab by mouth every seven (7) days. Calcium-Cholecalciferol (D3) 500 mg(1,250mg) -400 unit Tab Take  by mouth. co-enzyme Q-10 100 mg capsule Commonly known as:  CO Q-10 Take 1 capsule by mouth daily. hydrOXYzine pamoate 25 mg capsule Commonly known as:  VISTARIL Take 1 Cap by mouth three (3) times daily as needed for Itching for up to 14 days. raNITIdine 150 mg tablet Commonly known as:  ZANTAC TAKE ONE TABLET BY MOUTH TWICE DAILY FOR  ACID  REFLUX  
  
 tolterodine ER 2 mg ER capsule Commonly known as:  DETROL-LA  
TAKE ONE CAPSULE BY MOUTH ONCE DAILY  
  
 traZODone 50 mg tablet Commonly known as:  DESYREL  
TAKE UP TO 3 PER NIGHT AS NEEDED BY MOUTH.  
  
 triamcinolone acetonide 0.1 % topical cream  
Commonly known as:  KENALOG Apply  to affected area two (2) times a day. use thin layer VITAMIN D3 2,000 unit Tab Generic drug:  cholecalciferol (vitamin D3) Take  by mouth. Prescriptions Sent to Pharmacy Refills  
 hydrOXYzine pamoate (VISTARIL) 25 mg capsule 0 Sig: Take 1 Cap by mouth three (3) times daily as needed for Itching for up to 14 days. Class: Normal  
 Pharmacy: [facility] [address] TRACY BILLINGS [address] [facility], [address] [facility] Ph #: [phone] Route: Oral  
 triamcinolone acetonide (KENALOG) 0.1 % topical cream 1 Sig: Apply  to affected area two (2) times a day. use thin layer Class: Normal  
 Pharmacy: [facility] [address] TRACY BILLINGS [address] [facility], [address] [facility] Ph #: [phone] Route: Topical  
  
We Performed the Following AMB POC URINALYSIS DIP STICK AUTO W/O MICRO [86876 CPT(R)] CULTURE, URINE [id number] CPT(R)] Follow-up Instructions Return in about 5 days (around 7/30/2018) for routine follow up. Patient Instructions NO ibuprofen, aleve advil, motrin,naprosyn. Tylenol only for pain Allergic Reaction: Care Instructions Your Care Instructions An allergic reaction is an excessive response from your immune system to a medicine, chemical, food, insect bite, or other substance. A reaction can range from mild to life-threatening. Some people have a mild rash, hives, and itching or stomach cramps. In severe reactions, swelling of your tongue and throat can close up your airway so that you cannot breathe. Follow-up care is a key part of your treatment and safety. Be sure to make and go to all appointments, and call your doctor if you are having problems. It's also a good idea to know your test results and keep a list of the medicines you take. How can you care for yourself at home? · If you know what caused your allergic reaction, be sure to avoid it. Your allergy may become more severe each time you have a reaction. · Take an over-the-counter antihistamine, such as cetirizine (Zyrtec) or loratadine (Claritin), to treat mild symptoms.  Read and follow directions on the label. Some antihistamines can make you feel sleepy. Do not give antihistamines to a child unless you have checked with your doctor first. Mild symptoms include sneezing or an itchy or runny nose; an itchy mouth; a few hives or mild itching; and mild nausea or stomach discomfort. · Do not scratch hives or a rash. Put a cold, moist towel on them or take cool baths to relieve itching. Put ice packs on hives, swelling, or insect stings for 10 to 15 minutes at a time. Put a thin cloth between the ice pack and your skin. Do not take hot baths or showers. They will make the itching worse. · Your doctor may prescribe a shot of epinephrine to carry with you in case you have a severe reaction. Learn how to give yourself the shot and keep it with you at all times. Make sure it is not . · Go to the emergency room every time you have a severe reaction, even if you have used your shot of epinephrine and are feeling better. Symptoms can come back after a shot. · Wear medical alert jewelry that lists your allergies. You can buy this at most [employer]. · If your child has a severe allergy, make sure that his or her teachers, babysitters, coaches, and other caregivers know about the allergy. They should have an epinephrine shot, know how and when to give it, and have a plan to take your child to the hospital. 
When should you call for help? Give an epinephrine shot if: 
  · You think you are having a severe allergic reaction.  
  · You have symptoms in more than one body area, such as mild nausea and an itchy mouth.  
 After giving an epinephrine shot call 911, even if you feel better. 
 Call 911 if: 
  · You have symptoms of a severe allergic reaction. These may include: 
¨ Sudden raised, red areas (hives) all over your body. ¨ Swelling of the throat, mouth, lips, or tongue. ¨ Trouble breathing. ¨ Passing out (losing consciousness).  Or you may feel very lightheaded or suddenly feel weak, confused, or restless.  
  · You have been given an epinephrine shot, even if you feel better.  
 Call your doctor now or seek immediate medical care if: 
  · You have symptoms of an allergic reaction, such as: ¨ A rash or hives (raised, red areas on the skin). ¨ Itching. ¨ Swelling. ¨ Belly pain, nausea, or vomiting.  
 Watch closely for changes in your health, and be sure to contact your doctor if: 
  · You do not get better as expected. Where can you learn more? Go to http://radha-zunilda.info/. Enter Q617 in the search box to learn more about \"Allergic Reaction: Care Instructions. \" Current as of: October 6, 2017 Content Version: 11.7 © 1144-7192 [employer]. Care instructions adapted under license by [employer] (which disclaims liability or warranty for this information). If you have questions about a medical condition or this instruction, always ask your healthcare professional. [place] [address] any warranty or liability for your use of this information. Introducing [facility]! [employer] introduces Mundi patient portal. Now you can access parts of your medical record, email your doctor's office, and request medication refills online. 1. In your internet browser, go to [email or web address] 2. Click on the First Time User? Click Here link in the Sign In box. You will see the New Member Sign Up page. 3. Enter your Mundi Access Code exactly as it appears below. You will not need to use this code after youve completed the sign-up process. If you do not sign up before the expiration date, you must request a new code. · Mundi Access Code: [email or web address] Expires: 9/9/2018  9:29 AM 
 
4. Enter the last four digits of your Social Security Number (xxxx) and Date of Birth (mm/dd/yyyy) as indicated and click Submit. You will be taken to the next sign-up page. 5. Create a DecideQuick ID. This will be your DecideQuick login ID and cannot be changed, so think of one that is secure and easy to remember. 6. Create a DecideQuick password. You can change your password at any time. 7. Enter your Password Reset Question and Answer. This can be used at a later time if you forget your password. 8. Enter your e-mail address. You will receive e-mail notification when new information is available in [address] 9. Click Sign Up. You can now view and download portions of your medical record. 10. Click the Download Summary menu link to download a portable copy of your medical information. If you have questions, please visit the Frequently Asked Questions section of the DecideQuick website. Remember, DecideQuick is NOT to be used for urgent needs. For medical emergencies, dial 911. Now available from your iPhone and Android! Please provide this summary of care documentation to your next provider. Your primary care clinician is listed as Ranulfo Goldsmith. If you have any questions after today's visit, please call [phone].

## 2018-07-25 NOTE — PROGRESS NOTES
Chief Complaint   Patient presents with    Rash     large red, itching rash to arms, trunk, buttuck, chest and behind ears, abd pain and nausea     I have reviewed the patient's medical history in detail and updated the computerized patient record. Health Maintenance reviewed. 1. Have you been to the ER, urgent care clinic since your last visit? Hospitalized since your last visit?no    2. Have you seen or consulted any other health care providers outside of the [address] [place] [address] [place] since your last visit? Include any pap smears or colon screening. No    Encouraged pt to discuss pt's wishes with spouse/partner/family and bring them in the next appt to follow thru with the Advanced Directive    Fall Risk Assessment, last 12 mths 7/25/2018   Able to walk? Yes   Fall in past 12 months? No   Fall with injury? -   Number of falls in past 12 months -   Fall Risk Score -       PHQ over the last two weeks 7/25/2018   Little interest or pleasure in doing things Several days   Feeling down, depressed, irritable, or hopeless Several days   Total Score PHQ 2 2       Abuse Screening Questionnaire 1/29/2018   Do you ever feel afraid of your partner? N   Are you in a relationship with someone who physically or mentally threatens you? N   Is it safe for you to go home?  Y       ADL Assessment 1/29/2018   Feeding yourself No Help Needed   Getting from bed to chair No Help Needed   Getting dressed No Help Needed   Bathing or showering No Help Needed   Walk across the room (includes cane/walker) No Help Needed   Using the telphone No Help Needed   Taking your medications No Help Needed   Preparing meals No Help Needed   Managing money (expenses/bills) No Help Needed   Moderately strenuous housework (laundry) No Help Needed   Shopping for personal items (toiletries/medicines) No Help Needed   Shopping for groceries No Help Needed   Driving No Help Needed   Climbing a flight of stairs No Help Needed   Getting to places beyond walking distances No Help Needed

## 2018-07-25 NOTE — PROGRESS NOTES
HISTORY OF PRESENT ILLNESS  Anamaria Roman is a 65 y.o. female. Rash    The history is provided by the patient. This is a new problem. The current episode started yesterday. The problem has been gradually worsening. The problem is associated with medication (ibuprofen x 1 week). There has been no fever. The rash is present on the chest, neck, left arm, right arm, trunk, back, left upper leg, right upper leg and right buttock. Patient Active Problem List   Diagnosis Code    Hyperlipidemia E78.5    Hypertriglyceridemia E78.1    Osteoporosis M81.0    Pre-diabetes R73.03    Osteoarthritis, knee M17.10    Diverticulosis K57.90    Essential hypertension I10     Allergies   Allergen Reactions    Nsaids (Non-Steroidal Anti-Inflammatory Drug) Rash    Naprosyn [Naproxen] Nausea Only     Social History     Social History    Marital status:      Spouse name: N/A    Number of children: 3    Years of education: N/A     Occupational History          retired semi     Social History Main Topics    Smoking status: Former Smoker     Packs/day: 1.00     Years: 0.00     Quit date: 1990    Smokeless tobacco: Never Used    Alcohol use No    Drug use: No    Sexual activity: No     Other Topics Concern    Not on file     Social History Narrative    Son lives with her, son passed away  2016 of an aneurysm, now lives alone     Past Surgical History:   Procedure Laterality Date    ABDOMEN SURGERY PROC UNLISTED      HX CATARACT REMOVAL      HX COLECTOMY  2014    for a perf    HX COLONOSCOPY      with EGD    HX GYN      hysterectomy    HX GYN      vaginal delivery x 3    HX HIP FRACTURE TX Left 2018    HX KNEE REPLACEMENT Left 2016       Review of Systems   Constitutional: Negative for fever. Respiratory: Negative for shortness of breath. Cardiovascular: Negative for chest pain. Musculoskeletal: Negative for falls. Skin: Positive for rash. Physical Exam  Visit Vitals    /78    Pulse 78    Temp 97.2 °F (36.2 °C) (Oral)    Resp 18    Ht 5' 1\" (1.549 m)    Wt 132 lb (59.9 kg)    SpO2 98%    BMI 24.94 kg/m2     WD WN female NAD  Heart RRR without murmers clicks or rubs  Lungs CTA  Abdo soft nontender  Ext no edema  Derm, erythematous macules patchy forearm some on trunk as well. Legs sparing  ASSESSMENT and PLAN  Encounter Diagnoses   Name Primary?  Allergic reaction to drug, initial encounter Yes    Dysuria     Essential hypertension      Orders Placed This Encounter    SPECIMEN HANDLING,DR OFF->LAB    CULTURE, URINE    AMB POC URINALYSIS DIP STICK AUTO W/O MICRO    DISCONTD: ibuprofen (MOTRIN) 200 mg tablet    hydrOXYzine pamoate (VISTARIL) 25 mg capsule    triamcinolone acetonide (KENALOG) 0.1 % topical cream     Avoids nsaids from now on   BP up from rash  Will check urine Cx seems to have some dysuria. UA looks OK  Follow-up Disposition:  Return in about 5 days (around 7/30/2018) for routine follow up.

## 2018-07-25 NOTE — PATIENT INSTRUCTIONS
NO ibuprofen, aleve advil, motrin,naprosyn. Tylenol only for pain     Allergic Reaction: Care Instructions  Your Care Instructions    An allergic reaction is an excessive response from your immune system to a medicine, chemical, food, insect bite, or other substance. A reaction can range from mild to life-threatening. Some people have a mild rash, hives, and itching or stomach cramps. In severe reactions, swelling of your tongue and throat can close up your airway so that you cannot breathe. Follow-up care is a key part of your treatment and safety. Be sure to make and go to all appointments, and call your doctor if you are having problems. It's also a good idea to know your test results and keep a list of the medicines you take. How can you care for yourself at home? · If you know what caused your allergic reaction, be sure to avoid it. Your allergy may become more severe each time you have a reaction. · Take an over-the-counter antihistamine, such as cetirizine (Zyrtec) or loratadine (Claritin), to treat mild symptoms. Read and follow directions on the label. Some antihistamines can make you feel sleepy. Do not give antihistamines to a child unless you have checked with your doctor first. Mild symptoms include sneezing or an itchy or runny nose; an itchy mouth; a few hives or mild itching; and mild nausea or stomach discomfort. · Do not scratch hives or a rash. Put a cold, moist towel on them or take cool baths to relieve itching. Put ice packs on hives, swelling, or insect stings for 10 to 15 minutes at a time. Put a thin cloth between the ice pack and your skin. Do not take hot baths or showers. They will make the itching worse. · Your doctor may prescribe a shot of epinephrine to carry with you in case you have a severe reaction. Learn how to give yourself the shot and keep it with you at all times. Make sure it is not .   · Go to the emergency room every time you have a severe reaction, even if you have used your shot of epinephrine and are feeling better. Symptoms can come back after a shot. · Wear medical alert jewelry that lists your allergies. You can buy this at most drugsGymRealmes. · If your child has a severe allergy, make sure that his or her teachers, babysitters, coaches, and other caregivers know about the allergy. They should have an epinephrine shot, know how and when to give it, and have a plan to take your child to the hospital.  When should you call for help? Give an epinephrine shot if:    · You think you are having a severe allergic reaction.     · You have symptoms in more than one body area, such as mild nausea and an itchy mouth.    After giving an epinephrine shot call 911, even if you feel better.   Call 911 if:    · You have symptoms of a severe allergic reaction. These may include:  ¨ Sudden raised, red areas (hives) all over your body. ¨ Swelling of the throat, mouth, lips, or tongue. ¨ Trouble breathing. ¨ Passing out (losing consciousness). Or you may feel very lightheaded or suddenly feel weak, confused, or restless.     · You have been given an epinephrine shot, even if you feel better.    Call your doctor now or seek immediate medical care if:    · You have symptoms of an allergic reaction, such as:  ¨ A rash or hives (raised, red areas on the skin). ¨ Itching. ¨ Swelling. ¨ Belly pain, nausea, or vomiting.    Watch closely for changes in your health, and be sure to contact your doctor if:    · You do not get better as expected. Where can you learn more? Go to http://radha-zunilda.info/. Enter R550 in the search box to learn more about \"Allergic Reaction: Care Instructions. \"  Current as of: October 6, 2017  Content Version: 11.7  © 0478-0113 [employer]Kit. Care instructions adapted under license by [employer] (which disclaims liability or warranty for this information).  If you have questions about a medical condition or this instruction, always ask your healthcare professional. [place] [address] any warranty or liability for your use of this information.

## 2018-07-26 ENCOUNTER — TELEPHONE (OUTPATIENT)
Dept: INTERNAL MEDICINE CLINIC | Age: 78
End: 2018-07-26

## 2018-07-26 LAB — BACTERIA UR CULT: NO GROWTH

## 2018-07-26 RX ORDER — PREDNISONE 10 MG/1
TABLET ORAL
Qty: 20 TAB | Refills: 0 | Status: SHIPPED | OUTPATIENT
Start: 2018-07-26 | End: 2018-09-10 | Stop reason: ALTCHOICE

## 2018-07-26 NOTE — TELEPHONE ENCOUNTER
Patient called c/o that her itching is no better, redness is still there and cannot tell that there is any improvement after using the Kenalog or Vistaril - needs something else to take for itching - patient wonders about a steroid pack if that would help more - also can she use Benadryl with the Vistaril or substitute 1 for the other - Flynn Reaves LPN  2/17/7856  4:73 PM

## 2018-07-27 PROBLEM — Z96.642 STATUS POST HIP REPLACEMENT, LEFT: Status: ACTIVE | Noted: 2018-07-27

## 2018-07-27 PROBLEM — Z96.641 STATUS POST HIP REPLACEMENT, RIGHT: Status: ACTIVE | Noted: 2018-07-27

## 2018-07-30 ENCOUNTER — OFFICE VISIT (OUTPATIENT)
Dept: INTERNAL MEDICINE CLINIC | Age: 78
End: 2018-07-30

## 2018-07-30 VITALS
BODY MASS INDEX: 25.3 KG/M2 | HEIGHT: 61 IN | HEART RATE: 75 BPM | OXYGEN SATURATION: 99 % | TEMPERATURE: 97.2 F | DIASTOLIC BLOOD PRESSURE: 70 MMHG | RESPIRATION RATE: 16 BRPM | WEIGHT: 134 LBS | SYSTOLIC BLOOD PRESSURE: 144 MMHG

## 2018-07-30 DIAGNOSIS — T78.40XD ALLERGIC REACTION, SUBSEQUENT ENCOUNTER: Primary | ICD-10-CM

## 2018-07-30 DIAGNOSIS — K21.9 GASTROESOPHAGEAL REFLUX DISEASE, ESOPHAGITIS PRESENCE NOT SPECIFIED: ICD-10-CM

## 2018-07-30 DIAGNOSIS — R03.0 ELEVATED BLOOD PRESSURE READING: ICD-10-CM

## 2018-07-30 DIAGNOSIS — Z96.642 STATUS POST HIP REPLACEMENT, LEFT: ICD-10-CM

## 2018-07-30 NOTE — PROGRESS NOTES
HISTORY OF PRESENT ILLNESS Kary Arroyo is a 65 y.o. female. Rash The history is provided by the patient. This is a new problem. The current episode started more than 1 week ago. There has been no fever. The pain has been improving since onset. The treatment provided moderate relief. NSAID stopped Scheduled for 4th week PT for her hip Fx, last 1 cancelled. Thinking about the return to [facility] Past Surgical History:  
Procedure Laterality Date  [address] UNLISTED  HX CATARACT REMOVAL    HX COLECTOMY  2014  
 for a perf  HX COLONOSCOPY    
 with EGD  HX GYN    
 hysterectomy  HX GYN    
 vaginal delivery x 3  
 HX HIP FRACTURE TX Left 2018  HX KNEE REPLACEMENT Left 2016 Review of Systems Respiratory: Negative for shortness of breath. Cardiovascular: Negative for chest pain. Gastrointestinal: Positive for heartburn. Negative for blood in stool. Musculoskeletal:  
     Getting pt for hip Skin: Positive for rash. Social History Social History  Marital status:  Spouse name: N/A  
 Number of children: 3  
 Years of education: N/A Occupational History     
  retired semi Social History Main Topics  Smoking status: Former Smoker Packs/day: 1.00 Years: 0.00 Quit date: 1990  Smokeless tobacco: Never Used  Alcohol use No  
 Drug use: No  
 Sexual activity: No  
 
Other Topics Concern  Not on file Social History Narrative Son lives with her, son passed away  2016 of an aneurysm, now lives alone Physical Exam 
Visit Vitals  /70  Pulse 75  Temp 97.2 °F (36.2 °C) (Oral)  Resp 16  
 Ht 5' 1\" (1.549 m)  Wt 134 lb (60.8 kg)  SpO2 99%  BMI 25.32 kg/m2 WD WN female NAD Heart RRR without murmers clicks or rubs Lungs CTA Abdo soft nontender Ext no edema Skin much dec redness still occa macule but in gen much improved. ASSESSMENT and PLAN 
 
  ICD-10-CM ICD-9-CM 1. Allergic reaction, subsequent encounter T78.40XD V58.89   
  995.3 2. Gastroesophageal reflux disease, esophagitis presence not specified K21.9 530.81   
3. Status post hip replacement, left Z96.642 V43.64   
4. Elevated blood pressure reading R03.0 796.2 Discussed return to work, and need to cont PT. Doubt she can or should return to [facility]otk at [facility]. BP mildly elevated previously hypotensive with meds recheck with f/u as scheduled. Follow-up Disposition: Not on File As scheduled

## 2018-07-30 NOTE — PROGRESS NOTES
Follow up on rash and itching and blood pressure  Isabel Mayer LPN  6/88/5734  2:80 AM 
Fall Risk Assessment, last 12 mths 7/25/2018 Able to walk? Yes Fall in past 12 months? No  
Fall with injury? -  
Number of falls in past 12 months - Fall Risk Score -  
 
 
PHQ over the last two weeks 7/25/2018 Little interest or pleasure in doing things Several days Feeling down, depressed, irritable, or hopeless Several days Total Score PHQ 2 2 Abuse Screening Questionnaire 1/29/2018 Do you ever feel afraid of your partner? Kirsty Brar Are you in a relationship with someone who physically or mentally threatens you? Kirsty Brar Is it safe for you to go home? Y  
 
 

## 2018-07-30 NOTE — MR AVS SNAPSHOT
[address] 9890 [facility] 
[phone] Patient: Karyle Gent MRN: [id number] JPO:[id number] Visit Information Date & Time Provider Department Dept. Phone Encounter #  
 7/30/2018  9:00 AM MD Mayelin Underwood [phone] Your Appointments 9/10/2018 10:45 AM  
ACUTE CARE with MD Mayelin Underwood 380 [facility] Appt Note: f/u on bp $0 cp lsh 6/11/18  
 [address] [place] [address] Road. [address] 528 [facility] [address]  
[phone]  
  
   
 [address] [place] [address] [address] Upcoming Health Maintenance Date Due Influenza Age 5 to Adult 8/1/2018 GLAUCOMA SCREENING Q2Y 6/9/2019 DTaP/Tdap/Td series (2 - Td) 2/6/2027 Allergies as of 7/30/2018  Review Complete On: 7/30/2018 By: Marcella Sanchez MD  
  
 Severity Noted Reaction Type Reactions Nsaids (Non-steroidal Anti-inflammatory Drug) Medium 07/25/2018    Rash Naprosyn [Naproxen]  05/18/2015    Nausea Only Current Immunizations  Reviewed on 2/6/2017 Name Date H1N1 FLU VACCINE 3/24/2010 Influenza High Dose Vaccine PF 9/11/2017 Influenza Vaccine 8/20/2014, 9/11/2013 Influenza Vaccine Mely Lanius) 10/1/2015 Influenza Vaccine (Quad) PF 8/25/2016 Influenza Vaccine Split 9/13/2012, 10/6/2011 Influenza Vaccine Whole 10/7/2009 Pneumococcal Conjugate (PCV-13) 9/30/2016 Tdap 2/6/2017 ZZZ-RETIRED (DO NOT USE) Pneumococcal Vaccine (Unspecified Type) 4/28/2010 Zoster Vaccine, Live 4/8/2015 Not reviewed this visit You Were Diagnosed With   
  
 Codes Comments Allergic reaction, subsequent encounter    -  Primary ICD-10-CM: T78.40XD ICD-9-CM: V58.89, 995.3 Gastroesophageal reflux disease, esophagitis presence not specified     ICD-10-CM: K21.9 ICD-9-CM: 530.81   
 Status post hip replacement, left     ICD-10-CM: P71.119 ICD-9-CM: V43.64 Elevated blood pressure reading     ICD-10-CM: R03.0 ICD-9-CM: 796.2 Vitals BP Pulse Temp Resp Height(growth percentile) Weight(growth percentile) 144/70 75 97.2 °F (36.2 °C) (Oral) 16 5' 1\" (1.549 m) 134 lb (60.8 kg) SpO2 BMI OB Status Smoking Status 99% 25.32 kg/m2 Hysterectomy Former Smoker Vitals History BMI and BSA Data Body Mass Index Body Surface Area  
 25.32 kg/m 2 1.62 m 2 Preferred Pharmacy Pharmacy Name Phone [facility] [address] 75 9 [facility] [phone] Your Updated Medication List  
  
   
This list is accurate as of 7/30/18  9:18 AM.  Always use your most recent med list.  
  
  
  
  
 alendronate 70 mg tablet Commonly known as:  FOSAMAX Take 1 Tab by mouth every seven (7) days. Calcium-Cholecalciferol (D3) 500 mg(1,250mg) -400 unit Tab Take  by mouth. co-enzyme Q-10 100 mg capsule Commonly known as:  CO Q-10 Take 1 capsule by mouth daily. predniSONE 10 mg tablet Commonly known as:  DELTASONE  
4 tabs for 2 day, 3 tabs for 2 days, 2 tabs for 2 days, 1tab for 2 days  
  
 raNITIdine 150 mg tablet Commonly known as:  ZANTAC TAKE ONE TABLET BY MOUTH TWICE DAILY FOR  ACID  REFLUX  
  
 tolterodine ER 2 mg ER capsule Commonly known as:  DETROL-LA  
TAKE ONE CAPSULE BY MOUTH ONCE DAILY  
  
 traZODone 50 mg tablet Commonly known as:  DESYREL  
TAKE UP TO 3 PER NIGHT AS NEEDED BY MOUTH. VITAMIN D3 2,000 unit Tab Generic drug:  cholecalciferol (vitamin D3) Take  by mouth. Introducing [facility]! [facility] introduces Hubsphere patient portal. Now you can access parts of your medical record, email your doctor's office, and request medication refills online. 1. In your internet browser, go to [email or web address] 2. Click on the First Time User? Click Here link in the Sign In box. You will see the New Member Sign Up page. 3. Enter your CellPhire Access Code exactly as it appears below. You will not need to use this code after youve completed the sign-up process. If you do not sign up before the expiration date, you must request a new code. · CellPhire Access Code: [email or web address] Expires: 9/9/2018  9:29 AM 
 
4. Enter the last four digits of your Social Security Number (xxxx) and Date of Birth (mm/dd/yyyy) as indicated and click Submit. You will be taken to the next sign-up page. 5. Create a CellPhire ID. This will be your CellPhire login ID and cannot be changed, so think of one that is secure and easy to remember. 6. Create a CellPhire password. You can change your password at any time. 7. Enter your Password Reset Question and Answer. This can be used at a later time if you forget your password. 8. Enter your e-mail address. You will receive e-mail notification when new information is available in [address] 9. Click Sign Up. You can now view and download portions of your medical record. 10. Click the Download Summary menu link to download a portable copy of your medical information. If you have questions, please visit the Frequently Asked Questions section of the Cell[employer] website. Remember, CellPhire is NOT to be used for urgent needs. For medical emergencies, dial 911. Now available from your iPhone and Android! Please provide this summary of care documentation to your next provider. Your primary care clinician is listed as Francesca Fraser. If you have any questions after today's visit, please call [phone].

## 2018-08-06 ENCOUNTER — TELEPHONE (OUTPATIENT)
Dept: INTERNAL MEDICINE CLINIC | Age: 78
End: 2018-08-06

## 2018-08-06 RX ORDER — LISINOPRIL 5 MG/1
5 TABLET ORAL DAILY
Qty: 30 TAB | Refills: 5 | Status: SHIPPED | OUTPATIENT
Start: 2018-08-06 | End: 2018-09-10 | Stop reason: SDUPTHER

## 2018-08-06 NOTE — TELEPHONE ENCOUNTER
Patient states that she went to the dentist this morning to have a tooth filled and they wouldn't even do the procedure because her blood pressure was too high - states she she at risk of a heart attack or stroke - /100 - has been running elevated at home some too, but not like that - patient would like to ask if she can be restarted on a low dose of her blood pressure medication again - was previously on Lisinopril - this was discontinued after hip surgery due to low blood pressure, but it has slowly been creeping back up - she uses [address], LPN  4/5/8211  2:92 PM

## 2018-09-10 ENCOUNTER — OFFICE VISIT (OUTPATIENT)
Dept: INTERNAL MEDICINE CLINIC | Age: 78
End: 2018-09-10

## 2018-09-10 VITALS
WEIGHT: 134 LBS | TEMPERATURE: 97.3 F | BODY MASS INDEX: 25.3 KG/M2 | OXYGEN SATURATION: 98 % | SYSTOLIC BLOOD PRESSURE: 118 MMHG | HEIGHT: 61 IN | DIASTOLIC BLOOD PRESSURE: 61 MMHG | RESPIRATION RATE: 20 BRPM | HEART RATE: 82 BPM

## 2018-09-10 DIAGNOSIS — M17.12 PRIMARY OSTEOARTHRITIS OF LEFT KNEE: ICD-10-CM

## 2018-09-10 DIAGNOSIS — Z96.642 STATUS POST HIP REPLACEMENT, LEFT: ICD-10-CM

## 2018-09-10 DIAGNOSIS — K21.9 GASTROESOPHAGEAL REFLUX DISEASE, ESOPHAGITIS PRESENCE NOT SPECIFIED: ICD-10-CM

## 2018-09-10 DIAGNOSIS — Z23 ENCOUNTER FOR IMMUNIZATION: Primary | ICD-10-CM

## 2018-09-10 DIAGNOSIS — I10 ESSENTIAL HYPERTENSION: ICD-10-CM

## 2018-09-10 RX ORDER — ALENDRONATE SODIUM 70 MG/1
70 TABLET ORAL
Qty: 12 TAB | Refills: 3 | Status: SHIPPED | OUTPATIENT
Start: 2018-09-10 | End: 2018-12-03 | Stop reason: SDUPTHER

## 2018-09-10 RX ORDER — TOLTERODINE 2 MG/1
CAPSULE, EXTENDED RELEASE ORAL
Qty: 90 CAP | Refills: 3 | Status: SHIPPED | OUTPATIENT
Start: 2018-09-10 | End: 2018-11-26

## 2018-09-10 RX ORDER — TRAZODONE HYDROCHLORIDE 50 MG/1
50 TABLET ORAL
Qty: 90 TAB | Refills: 3 | Status: SHIPPED | OUTPATIENT
Start: 2018-09-10 | End: 2018-12-03 | Stop reason: SDUPTHER

## 2018-09-10 RX ORDER — LISINOPRIL 5 MG/1
5 TABLET ORAL DAILY
Qty: 90 TAB | Refills: 3 | Status: SHIPPED | OUTPATIENT
Start: 2018-09-10 | End: 2018-12-03 | Stop reason: SDUPTHER

## 2018-09-10 RX ORDER — RANITIDINE 150 MG/1
TABLET, FILM COATED ORAL
Qty: 180 TAB | Refills: 3 | Status: SHIPPED | OUTPATIENT
Start: 2018-09-10 | End: 2018-12-03 | Stop reason: SDUPTHER

## 2018-09-10 NOTE — PROGRESS NOTES
Follow up to check on blood pressure  Chika Ortega LPN  2/88/0488  12:98 AM 
Has no advance directive  Chika Ortega LPN  9/57/8282  47:82 AM 
Fall Risk Assessment, last 12 mths 9/10/2018 Able to walk? Yes Fall in past 12 months? Yes Fall with injury? Yes  
Number of falls in past 12 months 2 Fall Risk Score 3 PHQ over the last two weeks 9/10/2018 Little interest or pleasure in doing things Not at all Feeling down, depressed, irritable, or hopeless Not at all Total Score PHQ 2 0 Abuse Screening Questionnaire 1/29/2018 Do you ever feel afraid of your partner? Shira Hooks Are you in a relationship with someone who physically or mentally threatens you? Shira Hooks Is it safe for you to go home? Y  
 
 

## 2018-09-10 NOTE — MR AVS SNAPSHOT
[address]. [address] 6963 [facility] 
[phone] Patient: Laura [place] MRN: [id number] DPE:[id number] Visit Information Date & Time Provider Department Dept. Phone Encounter #  
 9/10/2018 10:45 AM Jeremias Ortega  [place] [id number] Follow-up Instructions Return in about 3 months (around 12/10/2018) for routine follow up. Upcoming Health Maintenance Date Due Influenza Age 5 to Adult 8/1/2018 GLAUCOMA SCREENING Q2Y 6/9/2019 DTaP/Tdap/Td series (2 - Td) 2/6/2027 Allergies as of 9/10/2018  Review Complete On: 9/10/2018 By: Chika Ortega LPN Severity Noted Reaction Type Reactions Nsaids (Non-steroidal Anti-inflammatory Drug) Medium 07/25/2018    Rash Naprosyn [Naproxen]  05/18/2015    Nausea Only Current Immunizations  Reviewed on 2/6/2017 Name Date H1N1 FLU VACCINE 3/24/2010 Influenza High Dose Vaccine PF 9/11/2017 Influenza Vaccine 8/20/2014, 9/11/2013 Influenza Vaccine Arletha Jazmin) 10/1/2015 Influenza Vaccine (Quad) PF 8/25/2016 Influenza Vaccine (Tri) Adjuvanted  Incomplete Influenza Vaccine Split 9/13/2012, 10/6/2011 Influenza Vaccine Whole 10/7/2009 Pneumococcal Conjugate (PCV-13) 9/30/2016 Tdap 2/6/2017 ZZZ-RETIRED (DO NOT USE) Pneumococcal Vaccine (Unspecified Type) 4/28/2010 Zoster Vaccine, Live 4/8/2015 Not reviewed this visit You Were Diagnosed With   
  
 Codes Comments Encounter for immunization    -  Primary ICD-10-CM: E76 ICD-9-CM: V03.89 Status post hip replacement, left     ICD-10-CM: Q29.351 ICD-9-CM: V43.64 Essential hypertension     ICD-10-CM: I10 
ICD-9-CM: 401.9 Primary osteoarthritis of left knee     ICD-10-CM: M17.12 
ICD-9-CM: 715.16 Vitals BP Pulse Temp Resp Height(growth percentile) Weight(growth percentile) 118/61 (BP 1 Location: Left arm, BP Patient Position: At rest) 82 97.3 °F (36.3 °C) (Oral) 20 5' 1\" (1.549 m) 134 lb (60.8 kg) SpO2 BMI OB Status Smoking Status 98% 25.32 kg/m2 Hysterectomy Former Smoker BMI and BSA Data Body Mass Index Body Surface Area  
 25.32 kg/m 2 1.62 m 2 Preferred Pharmacy Pharmacy Name Phone [facility] [place] [facility] [address] [facility] [phone] Your Updated Medication List  
  
   
This list is accurate as of 9/10/18 11:10 AM.  Always use your most recent med list.  
  
  
  
  
 alendronate 70 mg tablet Commonly known as:  FOSAMAX Take 1 Tab by mouth every seven (7) days. Calcium-Cholecalciferol (D3) 500 mg(1,250mg) -400 unit Tab Take  by mouth. co-enzyme Q-10 100 mg capsule Commonly known as:  CO Q-10 Take 1 capsule by mouth daily. lisinopril 5 mg tablet Commonly known as:  Michaelyn [place] Take 1 Tab by mouth daily. raNITIdine 150 mg tablet Commonly known as:  ZANTAC TAKE ONE TABLET BY MOUTH TWICE DAILY FOR  ACID  REFLUX  
  
 tolterodine ER 2 mg ER capsule Commonly known as:  DETROL-LA  
TAKE ONE CAPSULE BY MOUTH ONCE DAILY  
  
 traZODone 50 mg tablet Commonly known as:  Nataliia Heather Take 1 Tab by mouth nightly. VITAMIN D3 2,000 unit Tab Generic drug:  cholecalciferol (vitamin D3) Take  by mouth. Prescriptions Sent to Pharmacy Refills  
 lisinopril (PRINIVIL, ZESTRIL) 5 mg tablet 3 Sig: Take 1 Tab by mouth daily. Class: Normal  
 Pharmacy: [address], [address] [facility] Ph #: [phone] Route: Oral  
 alendronate (FOSAMAX) 70 mg tablet 3 Sig: Take 1 Tab by mouth every seven (7) days. Class: Normal  
 Pharmacy: [address] [place] [address], [address] [facility] Ph #: [phone]  Route: Oral  
 tolterodine ER (DETROL-LA) 2 mg ER capsule 3  
 Sig: TAKE ONE CAPSULE BY MOUTH ONCE DAILY Class: Normal  
 Pharmacy: [facility] [address] [facility], [address] Ph #: [phone]  
 raNITIdine (ZANTAC) 150 mg tablet 3 Sig: TAKE ONE TABLET BY MOUTH TWICE DAILY FOR  ACID  REFLUX Class: Normal  
 Pharmacy: [facility] [address] [facility], [address] Ph #: [phone]  
 traZODone (DESYREL) 50 mg tablet 3 Sig: Take 1 Tab by mouth nightly. Class: Normal  
 Pharmacy: [facility] [address] [facility], [address] Ph #: [phone] Route: Oral  
  
We Performed the Following INFLUENZA VACCINE INACTIVATED (IIV), SUBUNIT, ADJUVANTED, IM [id number] CPT(R)] Follow-up Instructions Return in about 3 months (around 12/10/2018) for routine follow up. Introducing [facility]! [facility] introduces ActiveTrak patient portal. Now you can access parts of your medical record, email your doctor's office, and request medication refills online. 1. In your internet browser, go to [email or web address] 2. Click on the First Time User? Click Here link in the Sign In box. You will see the New Member Sign Up page. 3. Enter your ActiveTrak Access Code exactly as it appears below. You will not need to use this code after youve completed the sign-up process. If you do not sign up before the expiration date, you must request a new code. · ActiveTrak Access Code: [email or web address] Expires: 12/9/2018 10:19 AM 
 
4. Enter the last four digits of your Social Security Number (xxxx) and Date of Birth (mm/dd/yyyy) as indicated and click Submit. You will be taken to the next sign-up page. 5. Create a ActiveTrak ID. This will be your ActiveTrak login ID and cannot be changed, so think of one that is secure and easy to remember. 6. Create a ActiveTrak password. You can change your password at any time. 7. Enter your Password Reset Question and Answer.  This can be used at a later time if you forget your password. 8. Enter your e-mail address. You will receive e-mail notification when new information is available in [address] 9. Click Sign Up. You can now view and download portions of your medical record. 10. Click the Download Summary menu link to download a portable copy of your medical information. If you have questions, please visit the Frequently Asked Questions section of the [employer] website. Remember, [employer] is NOT to be used for urgent needs. For medical emergencies, dial 911. Now available from your iPhone and Android! Please provide this summary of care documentation to your next provider. Your primary care clinician is listed as Yvonne Magallon. If you have any questions after today's visit, please call [phone].

## 2018-09-10 NOTE — PROGRESS NOTES
Subjective: Ed Kaufman is a 77 y.o. female who presents for follow up of hypertension and hip fracture New concerns: stil on leave from formerly [facility] [employer], Getting PT and cont to see Felipe. Does not think she can return back to the environment that is at [facility] but does think with some accommodations might be able to return. Currently on sick leave. Started on blood pressure pills at the last visit. Current Outpatient Prescriptions Medication Sig Dispense Refill  lisinopril (PRINIVIL, ZESTRIL) 5 mg tablet Take 1 Tab by mouth daily. 30 Tab 5  
 alendronate (FOSAMAX) 70 mg tablet Take 1 Tab by mouth every seven (7) days. 5 Tab 11  
 tolterodine ER (DETROL-LA) 2 mg ER capsule TAKE ONE CAPSULE BY MOUTH ONCE DAILY 90 Cap 1  cholecalciferol, vitamin D3, (VITAMIN D3) 2,000 unit tab Take  by mouth.  raNITIdine (ZANTAC) 150 mg tablet TAKE ONE TABLET BY MOUTH TWICE DAILY FOR  ACID  REFLUX 60 Tab 11  
 traZODone (DESYREL) 50 mg tablet TAKE UP TO 3 PER NIGHT AS NEEDED BY MOUTH. (Patient taking differently: 50 mg nightly. Try to stop wean gradually) 90 Tab 5  Calcium-Cholecalciferol, D3, 500 mg(1,250mg) -400 unit tab Take  by mouth.  co-enzyme Q-10 (CO Q-10) 100 mg capsule Take 1 capsule by mouth daily. 90 capsule 1 Allergies Allergen Reactions  Nsaids (Non-Steroidal Anti-Inflammatory Drug) Rash  Naprosyn [Naproxen] Nausea Only Diet and Lifestyle: nonsmoker Cardiovascular ROS: taking medications as instructed, no medication side effects noted, no TIA's, no chest pain on exertion, no dyspnea on exertion, no swelling of ankles. Review of Systems, additional: 
Pertinent items are noted in HPI. Min c/o HB Patient Active Problem List  
 Diagnosis Date Noted  Status post hip replacement, left 07/27/2018  Diverticulosis 05/08/2014  Osteoarthritis, knee 12/18/2012  Osteoporosis 12/13/2012  Pre-diabetes 12/13/2012  Hypertriglyceridemia 06/16/2011  Hyperlipidemia 05/11/2010 Current Outpatient Prescriptions Medication Sig Dispense Refill  lisinopril (PRINIVIL, ZESTRIL) 5 mg tablet Take 1 Tab by mouth daily. 30 Tab 5  
 alendronate (FOSAMAX) 70 mg tablet Take 1 Tab by mouth every seven (7) days. 5 Tab 11  
 tolterodine ER (DETROL-LA) 2 mg ER capsule TAKE ONE CAPSULE BY MOUTH ONCE DAILY 90 Cap 1  cholecalciferol, vitamin D3, (VITAMIN D3) 2,000 unit tab Take  by mouth.  raNITIdine (ZANTAC) 150 mg tablet TAKE ONE TABLET BY MOUTH TWICE DAILY FOR  ACID  REFLUX 60 Tab 11  
 traZODone (DESYREL) 50 mg tablet TAKE UP TO 3 PER NIGHT AS NEEDED BY MOUTH. (Patient taking differently: 50 mg nightly. Try to stop wean gradually) 90 Tab 5  Calcium-Cholecalciferol, D3, 500 mg(1,250mg) -400 unit tab Take  by mouth.  co-enzyme Q-10 (CO Q-10) 100 mg capsule Take 1 capsule by mouth daily. 90 capsule 1 Allergies Allergen Reactions  Nsaids (Non-Steroidal Anti-Inflammatory Drug) Rash  Naprosyn [Naproxen] Nausea Only Social History Substance Use Topics  Smoking status: Former Smoker Packs/day: 1.00 Years: 0.00 Quit date: 5/11/1990  Smokeless tobacco: Never Used  Alcohol use No  
  
 
Lab Results Component Value Date/Time GFR est non-AA 48 (L) 01/08/2018 10:43 AM  
GFR est AA 56 (L) 01/08/2018 10:43 AM  
Creatinine 1.10 (H) 01/08/2018 10:43 AM  
BUN 22 01/08/2018 10:43 AM  
Sodium 142 01/08/2018 10:43 AM  
Potassium 4.1 01/11/2018 12:00 AM  
Chloride 101 01/08/2018 10:43 AM  
CO2 17 (L) 01/08/2018 10:43 AM  
 
Lab Results Component Value Date/Time Glucose 68 01/08/2018 10:43 AM  
 Glucose  04/04/2017 01:54 PM  
   
 
 
 
Objective:  
 
Physical exam significant for the following:  
 
Elderly NAD Visit Vitals  /61 (BP 1 Location: Left arm, BP Patient Position: At rest)  Pulse 82  Temp 97.3 °F (36.3 °C) (Oral)  Resp 20  
 Ht 5' 1\" (1.549 m)  Wt 134 lb (60.8 kg)  SpO2 98%  BMI 25.32 kg/m2 Appearance: alert, well appearing, and in no distress. General exam: CVS exam BP noted to be well controlled today in office, S1, S2 normal, no gallop, no murmur, chest clear, no JVD, no HSM, no edema. .  
Assessment/Plan:  
 
hypertension well controlled, stable. ICD-10-CM ICD-9-CM 1. Encounter for immunization Z23 V03.89 INFLUENZA VACCINE INACTIVATED (IIV), SUBUNIT, ADJUVANTED, IM  
   MS IMMUNIZ ADMIN,1 SINGLE/COMB VAC/TOXOID 2. Status post hip replacement, left Z96.642 V43.64 MS IMMUNIZ ADMIN,1 SINGLE/COMB VAC/TOXOID 3. Essential hypertension I10 401.9 MS IMMUNIZ ADMIN,1 SINGLE/COMB VAC/TOXOID 4. Primary osteoarthritis of left knee M17.12 715.16 traZODone (DESYREL) 50 mg tablet MS IMMUNIZ ADMIN,1 SINGLE/COMB VAC/TOXOID 5. Gastroesophageal reflux disease, esophagitis presence not specified K21.9 530.81 Orders Placed This Encounter  INFLUENZA VACCINE INACTIVATED (IIV), SUBUNIT, ADJUVANTED, IM  
 lisinopril (PRINIVIL, ZESTRIL) 5 mg tablet Sig: Take 1 Tab by mouth daily. Dispense:  90 Tab Refill:  3  
 alendronate (FOSAMAX) 70 mg tablet Sig: Take 1 Tab by mouth every seven (7) days. Dispense:  12 Tab Refill:  3  
 tolterodine ER (DETROL-LA) 2 mg ER capsule Sig: TAKE ONE CAPSULE BY MOUTH ONCE DAILY Dispense:  90 Cap Refill:  3  
 raNITIdine (ZANTAC) 150 mg tablet Sig: TAKE ONE TABLET BY MOUTH TWICE DAILY FOR  ACID  REFLUX Dispense:  180 Tab Refill:  3 Please consider 90 day supplies to promote better adherence  traZODone (DESYREL) 50 mg tablet Sig: Take 1 Tab by mouth nightly. Dispense:  90 Tab Refill:  3 We discussed to return back to [facility]. I am not sure she will be able to return. Probably needs an evaluation from occupational health. She will discuss this with Dr Walter Rashid. GERD stable Follow-up Disposition: 
Return in about 3 months (around 12/10/2018) for routine follow up.

## 2018-11-26 ENCOUNTER — OFFICE VISIT (OUTPATIENT)
Dept: INTERNAL MEDICINE CLINIC | Age: 78
End: 2018-11-26

## 2018-11-26 VITALS
OXYGEN SATURATION: 98 % | RESPIRATION RATE: 18 BRPM | WEIGHT: 132 LBS | TEMPERATURE: 98.5 F | DIASTOLIC BLOOD PRESSURE: 77 MMHG | HEIGHT: 61 IN | SYSTOLIC BLOOD PRESSURE: 166 MMHG | HEART RATE: 85 BPM | BODY MASS INDEX: 24.92 KG/M2

## 2018-11-26 DIAGNOSIS — I10 ESSENTIAL HYPERTENSION: ICD-10-CM

## 2018-11-26 DIAGNOSIS — S39.011A STRAIN OF ABDOMINAL WALL, INITIAL ENCOUNTER: Primary | ICD-10-CM

## 2018-11-26 DIAGNOSIS — M81.0 AGE RELATED OSTEOPOROSIS, UNSPECIFIED PATHOLOGICAL FRACTURE PRESENCE: ICD-10-CM

## 2018-11-26 RX ORDER — DICLOFENAC SODIUM 10 MG/G
GEL TOPICAL 4 TIMES DAILY
Qty: 1 EACH | Refills: 0 | Status: SHIPPED | OUTPATIENT
Start: 2018-11-26 | End: 2019-03-11 | Stop reason: ALTCHOICE
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
## 2018-11-26 NOTE — PROGRESS NOTES
HISTORY OF PRESENT ILLNESS Reza Johnson is a 77 y.o. female. Rib Pain The history is provided by the patient. This is a new problem. The current episode started more than 2 days ago. The problem has not changed since onset. The problem occurs daily. The pain is associated with lifting, movement and emotional upset. The pain is present in the right side and left side (mid back). The pain is moderate. The pain does not radiate. The symptoms are aggravated by movement. Treatments tried: APAP. The treatment provided no relief. Patient Active Problem List  
Diagnosis Code  Hyperlipidemia E78.5  Hypertriglyceridemia E78.1  Osteoporosis M81.0  Pre-diabetes R73.03  
 Osteoarthritis, knee M17.10  Diverticulosis K57.90  
 Status post hip replacement, left I35.895 Review of Systems Genitourinary: Negative for dysuria and hematuria. Social History Socioeconomic History  Marital status:  Spouse name: Not on file  Number of children: 3  
 Years of education: Not on file  Highest education level: Not on file Social Needs  Financial resource strain: Not on file  Food insecurity - worry: Not on file  Food insecurity - inability: Not on file  Transportation needs - medical: Not on file  Transportation needs - non-medical: Not on file Occupational History  Occupation:  Comment: retired semi Tobacco Use  Smoking status: Former Smoker Packs/day: 1.00 Years: 0.00 Pack years: 0.00 Last attempt to quit: 1990 Years since quittin.5  Smokeless tobacco: Never Used Substance and Sexual Activity  Alcohol use: No  
  Alcohol/week: 0.0 oz  Drug use: No  
 Sexual activity: No  
Other Topics Concern  Not on file Social History Narrative Son lives with her, son passed away  2016 of an aneurysm, now lives alone On long term disability from [facility] Physical Exam 
Visit Vitals /77 (BP 1 Location: Left arm, BP Patient Position: Sitting) Pulse 85 Temp 98.5 °F (36.9 °C) (Oral) Resp 18 Ht 5' 1\" (1.549 m) Wt 132 lb (59.9 kg) SpO2 98% BMI 24.94 kg/m² WD WN female NAD Heart RRR without murmers clicks or rubs Lungs CTA Abdo soft nontender Ext no edema No rash ASSESSMENT and PLAN Orders Placed This Encounter  diclofenac (VOLTAREN) 1 % gel

## 2018-11-27 ENCOUNTER — TELEPHONE (OUTPATIENT)
Dept: INTERNAL MEDICINE CLINIC | Age: 78
End: 2018-11-27

## 2018-11-27 ENCOUNTER — OFFICE VISIT (OUTPATIENT)
Dept: INTERNAL MEDICINE CLINIC | Age: 78
End: 2018-11-27

## 2018-11-27 VITALS
RESPIRATION RATE: 16 BRPM | HEART RATE: 108 BPM | HEIGHT: 61 IN | OXYGEN SATURATION: 94 % | BODY MASS INDEX: 24.92 KG/M2 | SYSTOLIC BLOOD PRESSURE: 180 MMHG | DIASTOLIC BLOOD PRESSURE: 100 MMHG | WEIGHT: 132 LBS

## 2018-11-27 DIAGNOSIS — I10 ESSENTIAL HYPERTENSION: ICD-10-CM

## 2018-11-27 DIAGNOSIS — S39.012S: Primary | ICD-10-CM

## 2018-11-27 DIAGNOSIS — M54.6 ACUTE MIDLINE THORACIC BACK PAIN: Primary | ICD-10-CM

## 2018-11-27 DIAGNOSIS — R07.81 RIB PAIN: ICD-10-CM

## 2018-11-27 DIAGNOSIS — M81.0 OSTEOPOROSIS WITHOUT CURRENT PATHOLOGICAL FRACTURE, UNSPECIFIED OSTEOPOROSIS TYPE: ICD-10-CM

## 2018-11-27 RX ORDER — HYDROCODONE BITARTRATE AND ACETAMINOPHEN 5; 325 MG/1; MG/1
1 TABLET ORAL
Qty: 9 TAB | Refills: 0 | Status: SHIPPED | OUTPATIENT
Start: 2018-11-27 | End: 2018-12-11 | Stop reason: ALTCHOICE

## 2018-11-27 NOTE — TELEPHONE ENCOUNTER
Patient called in tears c/o severe pain between her shoulders down into ribs - states that when she coughs or sneezes it feels like her body is gonna explode - states she is unable to apply the cream that was given to her yesterday because she cant reach the area - discussed with Dr El Daily - he states she can go for an Xray to check out the area and then return to the office to be re evaluated - Xray was ordered  Yanira Joshi LPN  26/59/0451  3:81 AM

## 2018-11-27 NOTE — PROGRESS NOTES
HISTORY OF PRESENT ILLNESS Marilyn Jorge is a 77 y.o. female. Shoulder Pain The history is provided by the patient. The incident occurred more than 1 week ago. The incident occurred at home. Injury mechanism: holding up window shades. The left shoulder is affected. The pain is at a severity of 8/10. The pain has been fluctuating since onset. Rib Pain The current episode started more than 1 week ago. The pain is present in the substernal region, lateral region, right side and left side (moves). The pain is at a severity of 8/10. The pain radiates to the upper back. The symptoms are aggravated by movement. Pertinent negatives include no shortness of breath. Treatments tried: diclofenac cream cant apply to back. The treatment provided no relief. With her continual pain is asking for some stronger pain medications. Asks for x-rays. Patient Active Problem List  
Diagnosis Code  Hyperlipidemia E78.5  Hypertriglyceridemia E78.1  Osteoporosis M81.0  Pre-diabetes R73.03  
 Osteoarthritis, knee M17.10  Diverticulosis K57.90  
 Status post hip replacement, left X70.542 Review of Systems Respiratory: Negative for shortness of breath. Cardiovascular: Negative for chest pain. Musculoskeletal: Negative for falls. Social History Socioeconomic History  Marital status:  Spouse name: Not on file  Number of children: 3  
 Years of education: Not on file  Highest education level: Not on file Social Needs  Financial resource strain: Not on file  Food insecurity - worry: Not on file  Food insecurity - inability: Not on file  Transportation needs - medical: Not on file  Transportation needs - non-medical: Not on file Occupational History  Occupation:  Comment: retired semi Tobacco Use  Smoking status: Former Smoker Packs/day: 1.00 Years: 0.00 Pack years: 0.00 Last attempt to quit: 5/11/1990 Years since quittin.5  Smokeless tobacco: Never Used Substance and Sexual Activity  Alcohol use: No  
  Alcohol/week: 0.0 oz  Drug use: No  
 Sexual activity: No  
Other Topics Concern  Not on file Social History Narrative Son lives with her, son passed away  2016 of an aneurysm, now lives alone Visit Vitals BP (!) 180/100 (BP 1 Location: Left arm, BP Patient Position: At rest) Comment (BP Patient Position): manual  
Pulse (!) 108 Resp 16 Ht 5' 1\" (1.549 m) Wt 132 lb (59.9 kg) SpO2 94% BMI 24.94 kg/m² Physical Exam  
Constitutional: She appears well-developed and well-nourished. No distress. Cardiovascular: Normal rate, regular rhythm and normal heart sounds. Pulmonary/Chest: Effort normal. No respiratory distress. Abdominal: Soft. Musculoskeletal: She exhibits tenderness. She exhibits no edema or deformity. Skin: No erythema. ASSESSMENT and PLAN 
 
  ICD-10-CM ICD-9-CM 1. Strain, back, sequela S39.012S 905.7 HYDROcodone-acetaminophen (NORCO) 5-325 mg per tablet 2. Osteoporosis without current pathological fracture, unspecified osteoporosis type M81.0 733.00   
3. Essential hypertension I10 401.9 Orders Placed This Encounter  diphenhydrAMINE-Acetaminophen (PERCOGESIC) 12.5-325 mg tab  
 HYDROcodone-acetaminophen (NORCO) 5-325 mg per tablet Current Outpatient Medications Medication Sig Dispense Refill  diphenhydrAMINE-Acetaminophen (PERCOGESIC) 12.5-325 mg tab Take 2 Tabs by mouth every four (4) hours.  HYDROcodone-acetaminophen (NORCO) 5-325 mg per tablet Take 1 Tab by mouth every eight (8) hours as needed for Pain. Max Daily Amount: 3 Tabs. 9 Tab 0  
 lisinopril (PRINIVIL, ZESTRIL) 5 mg tablet Take 1 Tab by mouth daily. 90 Tab 3  
 alendronate (FOSAMAX) 70 mg tablet Take 1 Tab by mouth every seven (7) days.  12 Tab 3  
 raNITIdine (ZANTAC) 150 mg tablet TAKE ONE TABLET BY MOUTH TWICE DAILY FOR  ACID  REFLUX 180 Tab 3  
 traZODone (DESYREL) 50 mg tablet Take 1 Tab by mouth nightly. 90 Tab 3  cholecalciferol, vitamin D3, (VITAMIN D3) 2,000 unit tab Take  by mouth.  Calcium-Cholecalciferol, D3, 500 mg(1,250mg) -400 unit tab Take  by mouth.  co-enzyme Q-10 (CO Q-10) 100 mg capsule Take 1 capsule by mouth daily. 90 capsule 1  
 diclofenac (VOLTAREN) 1 % gel Apply  to affected area four (4) times daily. 1 Each 0 We discussed this pain and the usage of controlled substances for back pain relief. In general these medications are indicated for the acute symptom relief and not for chronic usage, allthough they are frequently used for chronic management  After a certain period of time they should be stopped to avoid dependence and/or addiction. I warned her about the dangers of addiction and other side affects including respiratory depression and death. Discussed how this is a controlled substance and that the prescribing of it is should be monitored very carefully. Drug usage is also monitored by our practise and the [facility], since there has been a lot of abuse and diversion of controlled substances. In general we do not refill this medication over the phone. PLease ask for enough pills to get you to your next appointment and make sure you keep your appointments. Warned not to take other medications like alcohol, other benzodiazapines marijuana or other narcotics when on this medication. Blood pressure up she is in pain. She will follow-up to recheck. Okay x-ray. Elevated cholesterol stable Follow-up Disposition: 
Return if symptoms worsen or fail to improve.

## 2018-12-03 DIAGNOSIS — M17.12 PRIMARY OSTEOARTHRITIS OF LEFT KNEE: ICD-10-CM

## 2018-12-03 DIAGNOSIS — S39.012S: ICD-10-CM

## 2018-12-03 RX ORDER — CHOLECALCIFEROL (VITAMIN D3) 125 MCG
1 CAPSULE ORAL DAILY
Qty: 90 TAB | Refills: 1 | Status: SHIPPED | OUTPATIENT
Start: 2018-12-03 | End: 2019-08-20 | Stop reason: ALTCHOICE

## 2018-12-03 RX ORDER — RANITIDINE 150 MG/1
TABLET, FILM COATED ORAL
Qty: 180 TAB | Refills: 1 | Status: SHIPPED | OUTPATIENT
Start: 2018-12-03 | End: 2019-05-10 | Stop reason: SDUPTHER

## 2018-12-03 RX ORDER — HYDROCODONE BITARTRATE AND ACETAMINOPHEN 5; 325 MG/1; MG/1
1 TABLET ORAL
Qty: 9 TAB | Refills: 0 | OUTPATIENT
Start: 2018-12-03

## 2018-12-03 RX ORDER — TRAZODONE HYDROCHLORIDE 50 MG/1
50 TABLET ORAL
Qty: 90 TAB | Refills: 1 | Status: SHIPPED | OUTPATIENT
Start: 2018-12-03 | End: 2019-05-10 | Stop reason: SDUPTHER

## 2018-12-03 RX ORDER — ALENDRONATE SODIUM 70 MG/1
70 TABLET ORAL
Qty: 12 TAB | Refills: 1 | Status: SHIPPED | OUTPATIENT
Start: 2018-12-03 | End: 2019-05-10 | Stop reason: SDUPTHER

## 2018-12-03 RX ORDER — LISINOPRIL 5 MG/1
5 TABLET ORAL DAILY
Qty: 90 TAB | Refills: 1 | Status: SHIPPED | OUTPATIENT
Start: 2018-12-03 | End: 2018-12-11
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
## 2018-12-11 ENCOUNTER — OFFICE VISIT (OUTPATIENT)
Dept: INTERNAL MEDICINE CLINIC | Age: 78
End: 2018-12-11

## 2018-12-11 VITALS
DIASTOLIC BLOOD PRESSURE: 90 MMHG | HEIGHT: 61 IN | WEIGHT: 132 LBS | BODY MASS INDEX: 24.92 KG/M2 | TEMPERATURE: 98.1 F | OXYGEN SATURATION: 95 % | HEART RATE: 82 BPM | SYSTOLIC BLOOD PRESSURE: 180 MMHG | RESPIRATION RATE: 16 BRPM

## 2018-12-11 DIAGNOSIS — I10 ESSENTIAL HYPERTENSION: Primary | ICD-10-CM

## 2018-12-11 DIAGNOSIS — R35.0 URINE FREQUENCY: ICD-10-CM

## 2018-12-11 DIAGNOSIS — F41.8 SITUATIONAL ANXIETY: ICD-10-CM

## 2018-12-11 LAB
BILIRUB UR QL STRIP: NEGATIVE
GLUCOSE UR-MCNC: NEGATIVE MG/DL
KETONES P FAST UR STRIP-MCNC: NEGATIVE MG/DL
PH UR STRIP: 7.5 [PH] (ref 4.6–8)
PROT UR QL STRIP: NORMAL
SP GR UR STRIP: 1.01 (ref 1–1.03)
UA UROBILINOGEN AMB POC: NORMAL (ref 0.2–1)
URINALYSIS CLARITY POC: NORMAL
URINALYSIS COLOR POC: YELLOW
URINE BLOOD POC: NEGATIVE
URINE LEUKOCYTES POC: NORMAL
URINE NITRITES POC: NEGATIVE

## 2018-12-11 RX ORDER — OXYBUTYNIN CHLORIDE 5 MG/1
5 TABLET ORAL
Qty: 30 TAB | Refills: 1 | Status: SHIPPED | OUTPATIENT
Start: 2018-12-11 | End: 2018-12-31 | Stop reason: SDUPTHER

## 2018-12-11 RX ORDER — BUSPIRONE HYDROCHLORIDE 5 MG/1
5 TABLET ORAL 2 TIMES DAILY
Qty: 60 TAB | Refills: 5 | Status: SHIPPED | OUTPATIENT
Start: 2018-12-11 | End: 2019-05-10 | Stop reason: SDUPTHER

## 2018-12-11 RX ORDER — LISINOPRIL 10 MG/1
10 TABLET ORAL DAILY
Qty: 30 TAB | Refills: 5 | Status: SHIPPED | OUTPATIENT
Start: 2018-12-11 | End: 2019-03-11 | Stop reason: SDUPTHER

## 2018-12-13 LAB — BACTERIA UR CULT: NO GROWTH

## 2018-12-13 NOTE — PROGRESS NOTES
Send normal/stable results letter. Your results are normal/stable. If not signed up, consider getting my chart to get your results on-line. We can help you to sign up. No urinary track infection.
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
## 2018-12-31 RX ORDER — OXYBUTYNIN CHLORIDE 5 MG/1
5 TABLET ORAL
Qty: 30 TAB | Refills: 5 | Status: SHIPPED | OUTPATIENT
Start: 2018-12-31 | End: 2019-05-10 | Stop reason: SDUPTHER

## 2018-12-31 NOTE — TELEPHONE ENCOUNTER
Patient claims she has lost this medication - hasnt had it for for a few nights and has to get up every 2 hours at night to urinate - asking if we can please give her more so that she can get some sleep.     Last office visit:  12/11/18  Last filled:  12/11/18 Ditropan 5mg once at night #30 X 1 refill  No changes  Follow up 1/8/19

## 2019-01-08 ENCOUNTER — OFFICE VISIT (OUTPATIENT)
Dept: INTERNAL MEDICINE CLINIC | Age: 79
End: 2019-01-08

## 2019-01-08 ENCOUNTER — DOCUMENTATION ONLY (OUTPATIENT)
Dept: INTERNAL MEDICINE CLINIC | Age: 79
End: 2019-01-08

## 2019-01-08 VITALS
OXYGEN SATURATION: 99 % | DIASTOLIC BLOOD PRESSURE: 64 MMHG | RESPIRATION RATE: 16 BRPM | SYSTOLIC BLOOD PRESSURE: 146 MMHG | HEART RATE: 60 BPM | WEIGHT: 132 LBS | HEIGHT: 61 IN | BODY MASS INDEX: 24.92 KG/M2 | TEMPERATURE: 97.7 F

## 2019-01-08 DIAGNOSIS — M81.0 OSTEOPOROSIS WITHOUT CURRENT PATHOLOGICAL FRACTURE, UNSPECIFIED OSTEOPOROSIS TYPE: ICD-10-CM

## 2019-01-08 DIAGNOSIS — K21.9 GASTROESOPHAGEAL REFLUX DISEASE, ESOPHAGITIS PRESENCE NOT SPECIFIED: ICD-10-CM

## 2019-01-08 DIAGNOSIS — I10 ESSENTIAL HYPERTENSION: ICD-10-CM

## 2019-01-08 DIAGNOSIS — F51.02 INSOMNIA DUE TO PSYCHOLOGICAL STRESS: Primary | ICD-10-CM

## 2019-01-08 DIAGNOSIS — Z96.642 STATUS POST HIP REPLACEMENT, LEFT: ICD-10-CM
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
## 2019-01-08 NOTE — PROGRESS NOTES
Follow up visit to check up on sleep and bladder control since starting Buspar and Ditropan -  Roby Mendoza LPN  2/5/8659  5:48 AM

## 2019-01-08 NOTE — PATIENT INSTRUCTIONS
Try melatonin 3mg or 5 mg at night to help you sleep. Insomnia: Care Instructions Your Care Instructions Insomnia is the inability to sleep well. It is a common problem for most people at some time. Insomnia may make it hard for you to get to sleep, stay asleep, or sleep as long as you need to. This can make you tired and grouchy during the day. It can also make you forgetful, less effective at work, and unhappy. Insomnia can be caused by conditions such as depression or anxiety. Pain can also affect your ability to sleep. When these problems are solved, the insomnia usually clears up. But sometimes bad sleep habits can cause insomnia. If insomnia is affecting your work or your enjoyment of life, you can take steps to improve your sleep. Follow-up care is a key part of your treatment and safety. Be sure to make and go to all appointments, and call your doctor if you are having problems. It's also a good idea to know your test results and keep a list of the medicines you take. How can you care for yourself at home? What to avoid · Do not have drinks with caffeine, such as coffee or black tea, for 8 hours before bed. · Do not smoke or use other types of tobacco near bedtime. Nicotine is a stimulant and can keep you awake. · Avoid drinking alcohol late in the evening, because it can cause you to wake in the middle of the night. · Do not eat a big meal close to bedtime. If you are hungry, eat a light snack. · Do not drink a lot of water close to bedtime, because the need to urinate may wake you up during the night. · Do not read or watch TV in bed. Use the bed only for sleeping and sexual activity. What to try · Go to bed at the same time every night, and wake up at the same time every morning. Do not take naps during the day. · Keep your bedroom quiet, dark, and cool. · Sleep on a comfortable pillow and mattress.  
· If watching the clock makes you anxious, turn it facing away from you so you cannot see the time. · If you worry when you lie down, start a worry book. Well before bedtime, write down your worries, and then set the book and your concerns aside. · Try meditation or other relaxation techniques before you go to bed. · If you cannot fall asleep, get up and go to another room until you feel sleepy. Do something relaxing. Repeat your bedtime routine before you go to bed again. · Make your house quiet and calm about an hour before bedtime. Turn down the lights, turn off the TV, log off the computer, and turn down the volume on music. This can help you relax after a busy day. When should you call for help? Watch closely for changes in your health, and be sure to contact your doctor if: 
  · Your efforts to improve your sleep do not work.  
  · Your insomnia gets worse.  
  · You have been feeling down, depressed, or hopeless or have lost interest in things that you usually enjoy. Where can you learn more? Go to http://radha-zunilda.info/. Enter P513 in the search box to learn more about \"Insomnia: Care Instructions. \" Current as of: June 29, 2018 Content Version: 11.8 © 1308-4268 [employer]. Care instructions adapted under license by [employer] (which disclaims liability or warranty for this information). If you have questions about a medical condition or this instruction, always ask your healthcare professional. [address] any warranty or liability for your use of this information.

## 2019-01-08 NOTE — PROGRESS NOTES
Per verbal order of Dakotah Jeffrey MD and written request from [facility] on behalf of patient faxed office notes from 10/1/18 through 1/8/19 to [phone] - confirmation of receipt received  Carlton Isabel LPN  7/0/3017  1:18 PM

## 2019-02-13 ENCOUNTER — TELEPHONE (OUTPATIENT)
Dept: INTERNAL MEDICINE CLINIC | Age: 79
End: 2019-02-13

## 2019-02-13 NOTE — LETTER
NOTIFICATION RETURN TO WORK / SCHOOL 
 
2/14/2019 11:59 AM 
 
Ms. Carla Gresham [address] [place] [address]: 
 
Carla Gresham is currently under the care of [address]. She should not return to work. Putting Ms. Margarito Hatch in a stressful work situation would be very unwise. She has osteoporosis. She is at a much higher risk of falling than your average employee. She is 77years old after all. She has already broken her left hip and I am afraid she would break it again or alternatively break her other hip. She should not return to work for her own safety. If there are questions or concerns please have the patient contact our office. Sincerely, Rose Law MD

## 2019-02-13 NOTE — TELEPHONE ENCOUNTER
Patient calls in very concerned that she received a phone call from [facility], her long term disability company, because they say they are cutting off her disability payments because after hearing from all three of her doctors, there is no reason that she should not be able to work- they advised patient that in order to be considered for a longer time they need a letter from Dr Milad Johnson stating specific reasons why she is not able to return to work - message sent to Dr Milad Johnson - letter will need to be faxed to [phone] [place] [address], N  6/56/6411  9:38 PM

## 2019-03-11 ENCOUNTER — OFFICE VISIT (OUTPATIENT)
Dept: INTERNAL MEDICINE CLINIC | Age: 79
End: 2019-03-11

## 2019-03-11 VITALS
TEMPERATURE: 98 F | WEIGHT: 132 LBS | OXYGEN SATURATION: 97 % | SYSTOLIC BLOOD PRESSURE: 160 MMHG | BODY MASS INDEX: 24.92 KG/M2 | RESPIRATION RATE: 16 BRPM | HEIGHT: 61 IN | HEART RATE: 65 BPM | DIASTOLIC BLOOD PRESSURE: 88 MMHG

## 2019-03-11 DIAGNOSIS — Z23 ENCOUNTER FOR IMMUNIZATION: ICD-10-CM

## 2019-03-11 DIAGNOSIS — Z13.39 SCREENING FOR ALCOHOLISM: ICD-10-CM

## 2019-03-11 DIAGNOSIS — Z13.6 SCREENING FOR ISCHEMIC HEART DISEASE: ICD-10-CM

## 2019-03-11 DIAGNOSIS — I10 ESSENTIAL HYPERTENSION: ICD-10-CM

## 2019-03-11 DIAGNOSIS — Z13.31 SCREENING FOR DEPRESSION: ICD-10-CM

## 2019-03-11 DIAGNOSIS — Z00.00 MEDICARE ANNUAL WELLNESS VISIT, SUBSEQUENT: Primary | ICD-10-CM

## 2019-03-11 DIAGNOSIS — Z13.1 SCREENING FOR DIABETES MELLITUS: ICD-10-CM

## 2019-03-11 DIAGNOSIS — Z71.89 COUNSELING REGARDING ADVANCED DIRECTIVES: ICD-10-CM

## 2019-03-11 RX ORDER — LISINOPRIL 10 MG/1
10 TABLET ORAL DAILY
Qty: 90 TAB | Refills: 3 | Status: SHIPPED | OUTPATIENT
Start: 2019-03-11 | End: 2019-05-10 | Stop reason: SDUPTHER

## 2019-03-11 NOTE — PATIENT INSTRUCTIONS
Medicare Wellness Visit, Female     The best way to live healthy is to have a lifestyle where you eat a well-balanced diet, exercise regularly, limit alcohol use, and quit all forms of tobacco/nicotine, if applicable. Regular preventive services are another way to keep healthy. Preventive services (vaccines, screening tests, monitoring & exams) can help personalize your care plan, which helps you manage your own care. Screening tests can find health problems at the earliest stages, when they are easiest to treat. [facility] follows the current, evidence-based guidelines published by the [facility] ([facility]) when recommending preventive services for our patients. Because we follow these guidelines, sometimes recommendations change over time as research supports it. (For example, mammograms used to be recommended annually. Even though Medicare will still pay for an annual mammogram, the newer guidelines recommend a mammogram every two years for women of average risk.)  Of course, you and your doctor may decide to screen more often for some diseases, based on your risk and your health status. Preventive services for you include:  - Medicare offers their members a free annual wellness visit, which is time for you and your primary care provider to discuss and plan for your preventive service needs. Take advantage of this benefit every year!  -All adults over the age of 72 should receive the recommended pneumonia vaccines. Current USPSTF guidelines recommend a series of two vaccines for the best pneumonia protection.   -All adults should have a flu vaccine yearly and a tetanus vaccine every 10 years. All adults age 61 and older should receive a shingles vaccine once in their lifetime.    -A bone mass density test is recommended when a woman turns 65 to screen for osteoporosis. This test is only recommended one time, as a screening.  Some providers will use this same test as a disease monitoring tool if you already have osteoporosis. -All adults age 38-68 who are overweight should have a diabetes screening test once every three years.   -Other screening tests and preventive services for persons with diabetes include: an eye exam to screen for diabetic retinopathy, a kidney function test, a foot exam, and stricter control over your cholesterol.   -Cardiovascular screening for adults with routine risk involves an electrocardiogram (ECG) at intervals determined by your doctor.   -Colorectal cancer screenings should be done for adults age 54-65 with no increased risk factors for colorectal cancer. There are a number of acceptable methods of screening for this type of cancer. Each test has its own benefits and drawbacks. Discuss with your doctor what is most appropriate for you during your annual wellness visit. The different tests include: colonoscopy (considered the best screening method), a fecal occult blood test, a fecal DNA test, and sigmoidoscopy. -Breast cancer screenings are recommended every other year for women of normal risk, age 54-69.  -Cervical cancer screenings for women over age 72 are only recommended with certain risk factors.   -All adults born between [facility] should be screened once for Hepatitis C.      Here is a list of your current Health Maintenance items (your personalized list of preventive services) with a due date:  Health Maintenance Due   Topic Date Due    Shingles Vaccine (2 of 2) 01/03/2019    Annual Well Visit  01/09/2019
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
## 2019-03-11 NOTE — PROGRESS NOTES
1. Have you been to the ER, urgent care clinic since your last visit? Hospitalized since your last visit? No    2. Have you seen or consulted any other health care providers outside of the [address] [place] [address] since your last visit? Include any pap smears or colon screening.   No

## 2019-03-11 NOTE — PROGRESS NOTES
====Geetha Padilla Invitation====    Patient was invited to [facility] on this date and given the information folder for review. Recommended appointment with Geetha Padilla facilitator for ACP conversation regarding advance directives. [] Yes  [] No  Referral sent to [facility] Choices team member or Coordinator for follow-up    [] Yes  [] No  Patient scheduled an appointment.        Site of Referral:__[facility]_________

## 2019-03-12 LAB
CHOLEST SERPL-MCNC: 231 MG/DL (ref 100–199)
HDLC SERPL-MCNC: 44 MG/DL
INTERPRETATION, 910389: NORMAL
LDLC SERPL CALC-MCNC: 149 MG/DL (ref 0–99)
TRIGL SERPL-MCNC: 192 MG/DL (ref 0–149)
VLDLC SERPL CALC-MCNC: 38 MG/DL (ref 5–40)

## 2019-05-10 DIAGNOSIS — M17.12 PRIMARY OSTEOARTHRITIS OF LEFT KNEE: ICD-10-CM

## 2019-05-10 DIAGNOSIS — I10 ESSENTIAL HYPERTENSION: ICD-10-CM

## 2019-05-10 NOTE — TELEPHONE ENCOUNTER
Requested Prescriptions     Pending Prescriptions Disp Refills    busPIRone (BUSPAR) 5 mg tablet 60 Tab 5     Sig: Take 1 Tab by mouth two (2) times a day. For nerves    alendronate (FOSAMAX) 70 mg tablet 12 Tab 1     Sig: Take 1 Tab by mouth every seven (7) days.  lisinopril (PRINIVIL, ZESTRIL) 10 mg tablet 90 Tab 3     Sig: Take 1 Tab by mouth daily.  oxybutynin (DITROPAN) 5 mg tablet 30 Tab 5     Sig: Take 1 Tab by mouth nightly.  raNITIdine (ZANTAC) 150 mg tablet 180 Tab 1     Sig: TAKE ONE TABLET BY MOUTH TWICE DAILY FOR  ACID  REFLUX    traZODone (DESYREL) 50 mg tablet 90 Tab 1     Sig: Take 1 Tab by mouth nightly.

## 2019-05-10 NOTE — TELEPHONE ENCOUNTER
Requested Prescriptions     Pending Prescriptions Disp Refills    busPIRone (BUSPAR) 5 mg tablet 60 Tab 5     Sig: Take 1 Tab by mouth two (2) times a day. For nerves    alendronate (FOSAMAX) 70 mg tablet 12 Tab 1     Sig: Take 1 Tab by mouth every seven (7) days.  lisinopril (PRINIVIL, ZESTRIL) 10 mg tablet 90 Tab 3     Sig: Take 1 Tab by mouth daily.  oxybutynin (DITROPAN) 5 mg tablet 30 Tab 5     Sig: Take 1 Tab by mouth nightly.  raNITIdine (ZANTAC) 150 mg tablet 180 Tab 1     Sig: TAKE ONE TABLET BY MOUTH TWICE DAILY FOR  ACID  REFLUX    traZODone (DESYREL) 50 mg tablet 90 Tab 1     Sig: Take 1 Tab by mouth nightly.      Future Appointments:  6/11/2019  9:15 AM Mey Dawn MD   Last Appointment With Me:  3/11/2019

## 2019-05-11 RX ORDER — TRAZODONE HYDROCHLORIDE 50 MG/1
50 TABLET ORAL
Qty: 90 TAB | Refills: 1 | Status: SHIPPED | OUTPATIENT
Start: 2019-05-11 | End: 2019-06-11 | Stop reason: SDUPTHER

## 2019-05-11 RX ORDER — RANITIDINE 150 MG/1
TABLET, FILM COATED ORAL
Qty: 180 TAB | Refills: 1 | Status: SHIPPED | OUTPATIENT
Start: 2019-05-11 | End: 2019-06-11 | Stop reason: SDUPTHER

## 2019-05-11 RX ORDER — BUSPIRONE HYDROCHLORIDE 5 MG/1
5 TABLET ORAL 2 TIMES DAILY
Qty: 60 TAB | Refills: 5 | Status: SHIPPED | OUTPATIENT
Start: 2019-05-11 | End: 2019-06-11 | Stop reason: SDUPTHER

## 2019-05-11 RX ORDER — ALENDRONATE SODIUM 70 MG/1
70 TABLET ORAL
Qty: 12 TAB | Refills: 1 | Status: SHIPPED | OUTPATIENT
Start: 2019-05-11 | End: 2019-06-11 | Stop reason: SDUPTHER

## 2019-05-11 RX ORDER — OXYBUTYNIN CHLORIDE 5 MG/1
5 TABLET ORAL
Qty: 30 TAB | Refills: 5 | Status: SHIPPED | OUTPATIENT
Start: 2019-05-11 | End: 2019-06-11 | Stop reason: SDUPTHER

## 2019-05-11 RX ORDER — LISINOPRIL 10 MG/1
5 TABLET ORAL DAILY
Qty: 45 TAB | Refills: 1 | Status: SHIPPED | OUTPATIENT
Start: 2019-05-11 | End: 2019-06-11

## 2019-05-14 DIAGNOSIS — M17.12 PRIMARY OSTEOARTHRITIS OF LEFT KNEE: ICD-10-CM

## 2019-05-14 DIAGNOSIS — I10 ESSENTIAL HYPERTENSION: ICD-10-CM

## 2019-05-14 RX ORDER — ALENDRONATE SODIUM 70 MG/1
70 TABLET ORAL
Qty: 12 TAB | Refills: 1 | Status: CANCELLED | OUTPATIENT
Start: 2019-05-14

## 2019-05-14 RX ORDER — TRAZODONE HYDROCHLORIDE 50 MG/1
50 TABLET ORAL
Qty: 90 TAB | Refills: 1 | Status: CANCELLED | OUTPATIENT
Start: 2019-05-14

## 2019-05-14 RX ORDER — LISINOPRIL 10 MG/1
5 TABLET ORAL DAILY
Qty: 45 TAB | Refills: 1 | Status: CANCELLED | OUTPATIENT
Start: 2019-05-14

## 2019-05-14 RX ORDER — BUSPIRONE HYDROCHLORIDE 5 MG/1
5 TABLET ORAL 2 TIMES DAILY
Qty: 60 TAB | Refills: 5 | Status: CANCELLED | OUTPATIENT
Start: 2019-05-14

## 2019-05-14 RX ORDER — RANITIDINE 150 MG/1
TABLET, FILM COATED ORAL
Qty: 180 TAB | Refills: 1 | Status: CANCELLED | OUTPATIENT
Start: 2019-05-14

## 2019-05-14 RX ORDER — OXYBUTYNIN CHLORIDE 5 MG/1
5 TABLET ORAL
Qty: 30 TAB | Refills: 5 | Status: CANCELLED | OUTPATIENT
Start: 2019-05-14

## 2019-05-14 NOTE — TELEPHONE ENCOUNTER
Requested Prescriptions     Pending Prescriptions Disp Refills    alendronate (FOSAMAX) 70 mg tablet 12 Tab 1     Sig: Take 1 Tab by mouth every seven (7) days.  busPIRone (BUSPAR) 5 mg tablet 60 Tab 5     Sig: Take 1 Tab by mouth two (2) times a day. For nerves    lisinopril (PRINIVIL, ZESTRIL) 10 mg tablet 45 Tab 1     Sig: Take 0.5 Tabs by mouth daily.  oxybutynin (DITROPAN) 5 mg tablet 30 Tab 5     Sig: Take 1 Tab by mouth nightly.  raNITIdine (ZANTAC) 150 mg tablet 180 Tab 1     Sig: TAKE ONE TABLET BY MOUTH TWICE DAILY FOR  ACID  REFLUX    traZODone (DESYREL) 50 mg tablet 90 Tab 1     Sig: Take 1 Tab by mouth nightly.

## 2019-05-14 NOTE — TELEPHONE ENCOUNTER
Medications have already been sent as refills to [address] on 05/11/2019  Jonathan Robles LPN  2/20/3844  2:19 AM

## 2019-05-21 ENCOUNTER — TELEPHONE (OUTPATIENT)
Dept: INTERNAL MEDICINE CLINIC | Age: 79
End: 2019-05-21

## 2019-05-21 NOTE — TELEPHONE ENCOUNTER
Patient has a difficult time getting her mail from her mailbox because [place] end of [place] road - she is S/P hip fracture and knee surgery and is requesting if Dr Mery Baumann can write her a letter explaining why she needs to have her mailbox placed closer to her residence so that she can get to it easier - message sent to Dr Mery Baumann - patient will  this letter if possible  Gabriela Sofia LPN  5/27/1726  5:15 PM

## 2019-05-21 NOTE — LETTER
NOTIFICATION OF ACCOMMODATION 
 
5/23/2019 2:05 PM 
 
Ms. Lukasz Gifford [address] To Whom It May Concern: 
 
Lukasz Gifford is currently under the care of [address]. She has hip and knee arthritis. She has had hip fracture surgery. It would help her greatly if her mail could be delivered closer to her residence, at the [facility]. If there are questions or concerns please have the patient contact our office. Sincerely, Kaylin Oseguera MD

## 2019-06-11 ENCOUNTER — OFFICE VISIT (OUTPATIENT)
Dept: INTERNAL MEDICINE CLINIC | Age: 79
End: 2019-06-11

## 2019-06-11 VITALS
SYSTOLIC BLOOD PRESSURE: 164 MMHG | BODY MASS INDEX: 24.55 KG/M2 | RESPIRATION RATE: 16 BRPM | WEIGHT: 130 LBS | HEIGHT: 61 IN | OXYGEN SATURATION: 96 % | TEMPERATURE: 97.5 F | HEART RATE: 74 BPM | DIASTOLIC BLOOD PRESSURE: 88 MMHG

## 2019-06-11 DIAGNOSIS — I10 ESSENTIAL HYPERTENSION: Primary | ICD-10-CM

## 2019-06-11 DIAGNOSIS — F51.02 INSOMNIA DUE TO PSYCHOLOGICAL STRESS: ICD-10-CM

## 2019-06-11 DIAGNOSIS — M81.0 AGE RELATED OSTEOPOROSIS, UNSPECIFIED PATHOLOGICAL FRACTURE PRESENCE: ICD-10-CM

## 2019-06-11 DIAGNOSIS — N32.89 IRRITABLE BLADDER: ICD-10-CM

## 2019-06-11 DIAGNOSIS — F51.02 INSOMNIA DUE TO STRESS: ICD-10-CM

## 2019-06-11 PROBLEM — S72.002D HIP FRACTURE REQUIRING OPERATIVE REPAIR, LEFT, CLOSED, WITH ROUTINE HEALING, SUBSEQUENT ENCOUNTER: Status: ACTIVE | Noted: 2019-06-11

## 2019-06-11 PROBLEM — K21.9 GERD (GASTROESOPHAGEAL REFLUX DISEASE): Status: ACTIVE | Noted: 2019-06-11

## 2019-06-11 RX ORDER — OXYBUTYNIN CHLORIDE 5 MG/1
5 TABLET ORAL
Qty: 90 TAB | Refills: 1 | Status: SHIPPED | OUTPATIENT
Start: 2019-06-11 | End: 2019-07-23 | Stop reason: SINTOL

## 2019-06-11 RX ORDER — RANITIDINE 150 MG/1
TABLET, FILM COATED ORAL
Qty: 180 TAB | Refills: 1 | Status: SHIPPED | OUTPATIENT
Start: 2019-06-11 | End: 2019-11-27 | Stop reason: SDUPTHER

## 2019-06-11 RX ORDER — TRAZODONE HYDROCHLORIDE 50 MG/1
50 TABLET ORAL
Qty: 90 TAB | Refills: 1 | Status: SHIPPED | OUTPATIENT
Start: 2019-06-11 | End: 2019-11-27 | Stop reason: SDUPTHER

## 2019-06-11 RX ORDER — BUSPIRONE HYDROCHLORIDE 5 MG/1
5 TABLET ORAL 2 TIMES DAILY
Qty: 180 TAB | Refills: 1 | Status: SHIPPED | OUTPATIENT
Start: 2019-06-11 | End: 2019-11-27 | Stop reason: SDUPTHER

## 2019-06-11 RX ORDER — LISINOPRIL 20 MG/1
20 TABLET ORAL DAILY
Qty: 90 TAB | Refills: 1 | Status: SHIPPED | OUTPATIENT
Start: 2019-06-11 | End: 2019-11-20

## 2019-06-11 RX ORDER — ALENDRONATE SODIUM 70 MG/1
70 TABLET ORAL
Qty: 12 TAB | Refills: 1 | Status: SHIPPED | OUTPATIENT
Start: 2019-06-11 | End: 2020-02-25 | Stop reason: SDUPTHER

## 2019-06-11 NOTE — PROGRESS NOTES
Chief Complaint   Patient presents with    Hypertension     follow up     I have reviewed the patient's medical history in detail and updated the computerized patient record. Health Maintenance reviewed. 1. Have you been to the ER, urgent care clinic since your last visit? Hospitalized since your last visit?no    2. Have you seen or consulted any other health care providers outside of the [address] [place] [address] [place] since your last visit? Include any pap smears or colon screening. No      Encouraged pt to discuss pt's wishes with spouse/partner/family and bring them in the next appt to follow thru with the Advanced Directive    Fall Risk Assessment, last 12 mths 6/11/2019   Able to walk? Yes   Fall in past 12 months? No   Fall with injury? -   Number of falls in past 12 months -   Fall Risk Score -       3 most recent PHQ Screens 6/11/2019   Little interest or pleasure in doing things Several days   Feeling down, depressed, irritable, or hopeless Several days   Total Score PHQ 2 2       Abuse Screening Questionnaire 6/11/2019   Do you ever feel afraid of your partner? N   Are you in a relationship with someone who physically or mentally threatens you? N   Is it safe for you to go home?  Y       ADL Assessment 6/11/2019   Feeding yourself No Help Needed   Getting from bed to chair No Help Needed   Getting dressed No Help Needed   Bathing or showering No Help Needed   Walk across the room (includes cane/walker) No Help Needed   Using the telphone No Help Needed   Taking your medications No Help Needed   Preparing meals No Help Needed   Managing money (expenses/bills) No Help Needed   Moderately strenuous housework (laundry) No Help Needed   Shopping for personal items (toiletries/medicines) No Help Needed   Shopping for groceries No Help Needed   Driving No Help Needed   Climbing a flight of stairs No Help Needed   Getting to places beyond walking distances No Help Needed
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
## 2019-06-11 NOTE — PATIENT INSTRUCTIONS
Try to reduce ditropan to half a pill daily, do exercises instead to help with your bladder. Maybe try to stop to see if you need this. Kegel Exercises: Care Instructions Your Care Instructions Kegel exercises strengthen muscles around the bladder. These muscles control the flow of urine. Kegel exercises are sometime called \"pelvic floor\" exercises. They can help prevent urine leakage and keep the pelvic organs in place. A woman who just had a baby might want to try Kegel exercises. They can strengthen pelvic muscles that have been weakened by pregnancy and childbirth. A man or woman may use Kegel exercises to treat urine leakage. You do Kegel exercises by tightening the muscles you use when you urinate. You will likely need to do these exercises for several weeks to get better. Follow-up care is a key part of your treatment and safety. Be sure to make and go to all appointments, and call your doctor if you are having problems. It's also a good idea to know your test results and keep a list of the medicines you take. How can you care for yourself at home? · Do Kegel exercises. ? Find the muscles you need to strengthen. To do this, tighten the muscles that stop your urine while you are going to the bathroom. These are the same muscles you squeeze during Kegel exercises. ? Squeeze the muscles as hard as you can. Your belly and thighs should not move. ? Hold the squeeze for 3 seconds. Then relax for 3 seconds. ? Start with 3 seconds, and then add 1 second each week until you are able to squeeze for 10 seconds. ? Repeat the exercise 10 to 15 times for each session. Do three or more sessions each day. · You can check to see if you are using the right muscles. Place a finger in your vagina and squeeze around it. You are doing them right when you feel pressure around your finger. Your doctor may also suggest that you put special weights in your vagina while you do the exercises. · Do not smoke. It can irritate the bladder. If you need help quitting, talk to your doctor about stop-smoking programs and medicines. These can increase your chances of quitting for good. Where can you learn more? Go to http://radha-zunilda.info/. Enter E364 in the search box to learn more about \"Kegel Exercises: Care Instructions. \" Current as of: March 20, 2018 Content Version: 11.9 © 4607-8642 [employer]. Care instructions adapted under license by [employer] (which disclaims liability or warranty for this information). If you have questions about a medical condition or this instruction, always ask your healthcare professional. [address] any warranty or liability for your use of this information.

## 2019-06-12 LAB
BUN SERPL-MCNC: 26 MG/DL (ref 8–27)
BUN/CREAT SERPL: 19 (ref 12–28)
CALCIUM SERPL-MCNC: 9.6 MG/DL (ref 8.7–10.3)
CHLORIDE SERPL-SCNC: 101 MMOL/L (ref 96–106)
CO2 SERPL-SCNC: 23 MMOL/L (ref 20–29)
CREAT SERPL-MCNC: 1.36 MG/DL (ref 0.57–1)
GLUCOSE SERPL-MCNC: 97 MG/DL (ref 65–99)
INTERPRETATION: NORMAL
POTASSIUM SERPL-SCNC: 4.7 MMOL/L (ref 3.5–5.2)
SODIUM SERPL-SCNC: 137 MMOL/L (ref 134–144)

## 2019-06-18 ENCOUNTER — CLINICAL SUPPORT (OUTPATIENT)
Dept: INTERNAL MEDICINE CLINIC | Age: 79
End: 2019-06-18

## 2019-06-18 VITALS
OXYGEN SATURATION: 97 % | HEART RATE: 83 BPM | RESPIRATION RATE: 16 BRPM | DIASTOLIC BLOOD PRESSURE: 80 MMHG | SYSTOLIC BLOOD PRESSURE: 120 MMHG

## 2019-06-18 DIAGNOSIS — I10 ESSENTIAL HYPERTENSION: Primary | ICD-10-CM

## 2019-06-18 NOTE — PROGRESS NOTES
Patient states he has not started taking the increased Lisinopril dose of 10mg yet - just received that from her pharmacy - she is still taking Lisinopril 5mg daily - here today for BP check and repeat BMP per order of Byron Ann MD - labs drawn and sent out to lab  Andria Adler LPN  8/17/9359  18:06 AM

## 2019-06-18 NOTE — PROGRESS NOTES
Patient came in today (06/18/2019) for repeat BMP and blood pressure check  Chidi Becker LPN  6/19/8788  8:49 PM

## 2019-06-19 LAB
BUN SERPL-MCNC: 27 MG/DL (ref 8–27)
BUN/CREAT SERPL: 24 (ref 12–28)
CALCIUM SERPL-MCNC: 10.3 MG/DL (ref 8.7–10.3)
CHLORIDE SERPL-SCNC: 105 MMOL/L (ref 96–106)
CO2 SERPL-SCNC: 23 MMOL/L (ref 20–29)
CREAT SERPL-MCNC: 1.14 MG/DL (ref 0.57–1)
GLUCOSE SERPL-MCNC: 107 MG/DL (ref 65–99)
INTERPRETATION: NORMAL
POTASSIUM SERPL-SCNC: 5.3 MMOL/L (ref 3.5–5.2)
SODIUM SERPL-SCNC: 141 MMOL/L (ref 134–144)

## 2019-06-19 NOTE — PROGRESS NOTES
Send normal/stable results letter. Your results are normal/stable. If not signed up, consider getting my chart to get your results on-line. We can help you to sign up.   Kidney function better/stable

## 2019-06-20 ENCOUNTER — TELEPHONE (OUTPATIENT)
Dept: INTERNAL MEDICINE CLINIC | Age: 79
End: 2019-06-20

## 2019-06-20 NOTE — TELEPHONE ENCOUNTER
----- Message from Minna Blizzard, MD sent at 6/19/2019  1:13 PM EDT -----  Send normal/stable results letter. Your results are normal/stable. If not signed up, consider getting my chart to get your results on-line. We can help you to sign up.   Kidney function better/stable

## 2019-06-20 NOTE — TELEPHONE ENCOUNTER
Discussed with patient kidney function better/ stable per order of Tamie Alcantara MD - patient will follow up in July 2019 as scheduled  Shea Allan LPN  0/14/4585  30:67 AM

## 2019-07-16 ENCOUNTER — CLINICAL SUPPORT (OUTPATIENT)
Dept: INTERNAL MEDICINE CLINIC | Age: 79
End: 2019-07-16

## 2019-07-16 VITALS
OXYGEN SATURATION: 97 % | TEMPERATURE: 97.8 F | HEART RATE: 92 BPM | RESPIRATION RATE: 16 BRPM | SYSTOLIC BLOOD PRESSURE: 170 MMHG | DIASTOLIC BLOOD PRESSURE: 80 MMHG

## 2019-07-16 DIAGNOSIS — I10 ESSENTIAL HYPERTENSION: Primary | ICD-10-CM

## 2019-07-16 NOTE — PROGRESS NOTES
Patient here for blood pressure check - fell at home 1 week ago - feels some soreness - appointment scheduled for Dr Rosalee Espinal in 1 week  - after speaking with Dr Rosalee Espinal, advised that we could fit her into our schedule this morning, otherwise keep her appt for next week and make sure she takes her medication every day as scheduled - patient elected to wait because she had to leave - will monitor her BP at home   Jeffrey Lee LPN  4/23/0869  6:12 AM

## 2019-07-23 ENCOUNTER — OFFICE VISIT (OUTPATIENT)
Dept: INTERNAL MEDICINE CLINIC | Age: 79
End: 2019-07-23

## 2019-07-23 VITALS
WEIGHT: 130 LBS | TEMPERATURE: 98.3 F | RESPIRATION RATE: 16 BRPM | HEART RATE: 90 BPM | DIASTOLIC BLOOD PRESSURE: 84 MMHG | SYSTOLIC BLOOD PRESSURE: 160 MMHG | OXYGEN SATURATION: 97 % | HEIGHT: 61 IN | BODY MASS INDEX: 24.55 KG/M2

## 2019-07-23 DIAGNOSIS — N32.89 IRRITABLE BLADDER: ICD-10-CM
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
## 2019-07-23 DIAGNOSIS — S89.91XA INJURY OF RIGHT KNEE, INITIAL ENCOUNTER: Primary | ICD-10-CM

## 2019-07-23 NOTE — PROGRESS NOTES
Reina Ramachandran 2 weeks ago at home - fell onto knees - swollen and bruised area under right knee - decreased energy - fatigued - elevated blood pressure - follow up Thea Hernandez LPN  9/08/5487  4:30 AM

## 2019-07-23 NOTE — PROGRESS NOTES
HISTORY OF PRESENT ILLNESS  Teresa Jara is a 77 y.o. female. Leg Pain    The history is provided by the patient. This is a new problem. The current episode started more than 1 week ago. The problem occurs hourly. The problem has not changed since onset. The pain is present in the right upper leg and right knee. The quality of the pain is described as aching. The pain is moderate. Pertinent negatives include full range of motion. There has been a history of trauma (fell 12 days ago). Fall   The history is provided by the patient and caregiver. The accident occurred more than 1 week ago. The fall occurred while standing. She fell from a height of 1 - 2 ft. She landed on hard floor. There was no blood loss. The point of impact was the left knee and right knee. She was not ambulatory at the scene (laid there for couple of hours). There was no entrapment after the fall. There was no alcohol use involved in the accident. Says a similar woozy sensation occurred when working for [facility] a year or 2 ago. No known cardiac disease. No issues with vision. Has been taking her Ditropan less continues for sleep medicine trazodone. 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 F51.02    GERD (gastroesophageal reflux disease) K21.9         Review of Systems   Respiratory: Negative for shortness of breath. Cardiovascular: Negative for chest pain. Gastrointestinal: Negative for blood in stool. Genitourinary: Negative for dysuria. Neurological: Positive for dizziness. Negative for focal weakness.         Just before she fell     Social History     Socioeconomic History    Marital status:      Spouse name: Not on file    Number of children: 3    Years of education: Not on file    Highest education level: Not on file   Occupational History    Occupation:      Comment: retired semi   Social Needs    Financial resource strain: Not on file    Food insecurity:     Worry: Not on file     Inability: Not on file   [place] needs:     Medical: Not on file     Non-medical: Not on file   Tobacco Use    Smoking status: Former Smoker     Packs/day: 1.00     Years: 0.00     Pack years: 0.00     Last attempt to quit: 1990     Years since quittin.2    Smokeless tobacco: Never Used   Substance and Sexual Activity    Alcohol use: No     Alcohol/week: 0.0 standard drinks    Drug use: No    Sexual activity: Never   Lifestyle    Physical activity:     Days per week: Not on file     Minutes per session: Not on file    Stress: Not on file   Relationships    Social connections:     Talks on phone: Not on file     Gets together: Not on file     Attends Spiritism service: Not on file     Active member of club or organization: Not on file     Attends meetings of clubs or organizations: Not on file     Relationship status: Not on file    Intimate partner violence:     Fear of current or ex partner: Not on file     Emotionally abused: Not on file     Physically abused: Not on file     Forced sexual activity: Not on file   Other Topics Concern    Not on file   Social History Narrative    Son lives with her, son passed away  2016 of an aneurysm, now lives alone   Remaining son checks in on her frequently. Physical Exam  Visit Vitals  /84 (BP 1 Location: Left arm, BP Patient Position: At rest) Comment (BP 1 Location): manual   Pulse 90   Temp 98.3 °F (36.8 °C) (Oral)   Resp 16   Ht 5' 1\" (1.549 m)   Wt 130 lb (59 kg)   SpO2 97%   BMI 24.56 kg/m²     Well developed well nourished no acute distress. Pupils equal round react to light, extraocular muscles intact.   Tympanic membranes within normal limits throat unremarkable  Cranial nerves II through XII are intact. Neck unremarkable  Heart regular rate and rhythm without clicks murmurs rubs  Lungs are clear to auscultation  Abdomen soft. Extremities, motor 5 out of 5 bilaterally, reflexes 2+ equal bilaterally. Fading bruises on her knees slight tenderness to palpation mild. Normal range of motion  ASSESSMENT and PLAN  Encounter Diagnoses   Name Primary?  Injury of right knee, initial encounter Yes    Fall, initial encounter     Irritable bladder      Orders Placed This Encounter    AMB SUPPLY ORDER    XR KNEE RT MAX 2 VWS    REFERRAL TO OPTOMETRY    AMB POC EKG ROUTINE W/ 12 LEADS, INTER & REP     Current Outpatient Medications   Medication Sig Dispense Refill    busPIRone (BUSPAR) 5 mg tablet Take 1 Tab by mouth two (2) times a day. For nerves 180 Tab 1    alendronate (FOSAMAX) 70 mg tablet Take 1 Tab by mouth every seven (7) days. 12 Tab 1    lisinopril (PRINIVIL, ZESTRIL) 20 mg tablet Take 1 Tab by mouth daily. 90 Tab 1    raNITIdine (ZANTAC) 150 mg tablet TAKE ONE TABLET BY MOUTH TWICE DAILY FOR  ACID  REFLUX 180 Tab 1    traZODone (DESYREL) 50 mg tablet Take 1 Tab by mouth nightly. 90 Tab 1    cholecalciferol, vitamin D3, (VITAMIN D3) 2,000 unit tab Take 1 Tab by mouth daily. 90 Tab 1    Calcium-Cholecalciferol, D3, 500 mg(1,250mg) -400 unit tab Take  by mouth.  co-enzyme Q-10 (CO Q-10) 100 mg capsule Take 1 capsule by mouth daily. 90 capsule 1     DO PT and go see opto to have vision checked  Stop Ditropan do Kegel exercises instead. Need to wean her off trazodone, substitute melatonin for that. We spent some time talking about [facility]. I showed her the rather long list of medications that are inappropriatly prescribed for the elderly. Discussed possible side affects, precautions, and drug interactions and possible benefits of the medication(s).     Follow-up and Dispositions    · Return in about 1 month (around 8/20/2019) for routine follow up.

## 2019-07-23 NOTE — PATIENT INSTRUCTIONS
Do kegals instead of taking ditropan. Try to get off trazodone, increases your falls risk       Kegel Exercises: Care Instructions  Your Care Instructions    Kegel exercises strengthen muscles around the bladder. These muscles control the flow of urine. Kegel exercises are sometime called \"pelvic floor\" exercises. They can help prevent urine leakage and keep the pelvic organs in place. A woman who just had a baby might want to try Kegel exercises. They can strengthen pelvic muscles that have been weakened by pregnancy and childbirth. A man or woman may use Kegel exercises to treat urine leakage. You do Kegel exercises by tightening the muscles you use when you urinate. You will likely need to do these exercises for several weeks to get better. Follow-up care is a key part of your treatment and safety. Be sure to make and go to all appointments, and call your doctor if you are having problems. It's also a good idea to know your test results and keep a list of the medicines you take. How can you care for yourself at home? · Do Kegel exercises. ? Find the muscles you need to strengthen. To do this, tighten the muscles that stop your urine while you are going to the bathroom. These are the same muscles you squeeze during Kegel exercises. ? Squeeze the muscles as hard as you can. Your belly and thighs should not move. ? Hold the squeeze for 3 seconds. Then relax for 3 seconds. ? Start with 3 seconds, and then add 1 second each week until you are able to squeeze for 10 seconds. ? Repeat the exercise 10 to 15 times for each session. Do three or more sessions each day. · You can check to see if you are using the right muscles. Place a finger in your vagina and squeeze around it. You are doing them right when you feel pressure around your finger. Your doctor may also suggest that you put special weights in your vagina while you do the exercises. · Do not smoke. It can irritate the bladder.  If you need help quitting, talk to your doctor about stop-smoking programs and medicines. These can increase your chances of quitting for good. Where can you learn more? Go to http://radha-zunilda.info/. Enter I033 in the search box to learn more about \"Kegel Exercises: Care Instructions. \"  Current as of: December 19, 2018  Content Version: 12.1  © 5665-1331 [employer]. Care instructions adapted under license by [employer] (which disclaims liability or warranty for this information). If you have questions about a medical condition or this instruction, always ask your healthcare professional. [place] [address] any warranty or liability for your use of this information. Preventing Falls: Care Instructions  Your Care Instructions    Getting around your home safely can be a challenge if you have injuries or health problems that make it easy for you to fall. Loose rugs and furniture in walkways are among the dangers for many older people who have problems walking or who have poor eyesight. People who have conditions such as arthritis, osteoporosis, or dementia also have to be careful not to fall. You can make your home safer with a few simple measures. Follow-up care is a key part of your treatment and safety. Be sure to make and go to all appointments, and call your doctor if you are having problems. It's also a good idea to know your test results and keep a list of the medicines you take. How can you care for yourself at home? Taking care of yourself  · You may get dizzy if you do not drink enough water. To prevent dehydration, drink plenty of fluids, enough so that your urine is light yellow or clear like water. Choose water and other caffeine-free clear liquids. If you have kidney, heart, or liver disease and have to limit fluids, talk with your doctor before you increase the amount of fluids you drink.   · Exercise regularly to improve your strength, muscle tone, and balance. Walk if you can. Swimming may be a good choice if you cannot walk easily. · Have your vision and hearing checked each year or any time you notice a change. If you have trouble seeing and hearing, you might not be able to avoid objects and could lose your balance. · Know the side effects of the medicines you take. Ask your doctor or pharmacist whether the medicines you take can affect your balance. Sleeping pills or sedatives can affect your balance. · Limit the amount of alcohol you drink. Alcohol can impair your balance and other senses. · Ask your doctor whether calluses or corns on your feet need to be removed. If you wear loose-fitting shoes because of calluses or corns, you can lose your balance and fall. · Talk to your doctor if you have numbness in your feet. Preventing falls at home  · Remove raised doorway thresholds, throw rugs, and clutter. Repair loose carpet or raised areas in the floor. · Move furniture and electrical cords to keep them out of walking paths. · Use nonskid floor wax, and wipe up spills right away, especially on ceramic tile floors. · If you use a walker or cane, put rubber tips on it. If you use crutches, clean the bottoms of them regularly with an abrasive pad, such as steel wool. · Keep your house well lit, especially [place], and outside walkways. Use night-lights in areas such as hallways and bathrooms. Add extra light switches or use remote switches (such as switches that go on or off when you clap your hands) to make it easier to turn lights on if you have to get up during the night. · Install sturdy handrails on stairways. · Move items in your cabinets so that the things you use a lot are on the lower shelves (about waist level). · Keep a cordless phone and a flashlight with new batteries by your bed. If possible, put a phone in each of the main rooms of your house, or carry a cell phone in case you fall and cannot reach a phone.  Or, you can wear a device around your neck or wrist. You push a button that sends a signal for help. · Wear low-heeled shoes that fit well and give your feet good support. Use footwear with nonskid soles. Check the heels and soles of your shoes for wear. Repair or replace worn heels or soles. · Do not wear socks without shoes on wood floors. · Walk on the grass when the sidewalks are slippery. If you live in an area that gets snow and ice in the winter, sprinkle salt on slippery steps and sidewalks. Preventing falls in the bath  · Install grab bars and nonskid mats inside and outside your shower or tub and near the toilet and sinks. · Use shower chairs and bath benches. · Use a hand-held shower head that will allow you to sit while showering. · Get into a tub or shower by putting the weaker leg in first. Get out of a tub or shower with your strong side first.  · Repair loose toilet seats and consider installing a raised toilet seat to make getting on and off the toilet easier. · Keep your bathroom door unlocked while you are in the shower. Where can you learn more? Go to http://radha-zunilda.info/. Enter 0476 79 69 71 in the search box to learn more about \"Preventing Falls: Care Instructions. \"  Current as of: November 7, 2018  Content Version: 12.1  © 2139-9928 [employer]. Care instructions adapted under license by [employer] (which disclaims liability or warranty for this information). If you have questions about a medical condition or this instruction, always ask your healthcare professional. [place] [address] any warranty or liability for your use of this information.

## 2019-07-24 ENCOUNTER — TELEPHONE (OUTPATIENT)
Dept: INTERNAL MEDICINE CLINIC | Age: 79
End: 2019-07-24

## 2019-07-24 RX ORDER — DICLOFENAC SODIUM 10 MG/G
GEL TOPICAL EVERY 6 HOURS
Qty: 1 EACH | Refills: 5 | Status: SHIPPED | OUTPATIENT
Start: 2019-07-24 | End: 2019-07-24 | Stop reason: SDUPTHER

## 2019-07-24 RX ORDER — DICLOFENAC SODIUM 10 MG/G
GEL TOPICAL EVERY 6 HOURS
Qty: 1 EACH | Refills: 0 | Status: SHIPPED | OUTPATIENT
Start: 2019-07-24 | End: 2019-11-20 | Stop reason: SDUPTHER

## 2019-07-24 NOTE — TELEPHONE ENCOUNTER
Received results of knee Xray - please review - patient c/o that pain just under her right knee just to the inside of the leg is very bad , makes her sick to her stomach - Is there anything she can take to help with the pain from her severe osteoarthritis  David Ray LPN  2/29/6950  42:56 AM

## 2019-07-25 ENCOUNTER — DOCUMENTATION ONLY (OUTPATIENT)
Dept: INTERNAL MEDICINE CLINIC | Age: 79
End: 2019-07-25

## 2019-08-19 NOTE — PROGRESS NOTES
Send normal/stable results letter. Your results are normal/stable. If not signed up, consider getting my chart to get your results on-line. We can help you to sign up. 10y 5m

## 2019-08-20 ENCOUNTER — OFFICE VISIT (OUTPATIENT)
Dept: INTERNAL MEDICINE CLINIC | Age: 79
End: 2019-08-20

## 2019-08-20 VITALS
RESPIRATION RATE: 20 BRPM | OXYGEN SATURATION: 97 % | SYSTOLIC BLOOD PRESSURE: 140 MMHG | TEMPERATURE: 98.1 F | WEIGHT: 133 LBS | BODY MASS INDEX: 25.11 KG/M2 | HEIGHT: 61 IN | HEART RATE: 74 BPM | DIASTOLIC BLOOD PRESSURE: 70 MMHG

## 2019-08-20 DIAGNOSIS — I10 ESSENTIAL HYPERTENSION: Primary | ICD-10-CM

## 2019-08-20 DIAGNOSIS — M81.0 AGE RELATED OSTEOPOROSIS, UNSPECIFIED PATHOLOGICAL FRACTURE PRESENCE: ICD-10-CM

## 2019-08-20 DIAGNOSIS — Z23 ENCOUNTER FOR IMMUNIZATION: ICD-10-CM

## 2019-08-20 DIAGNOSIS — F41.8 SITUATIONAL ANXIETY: ICD-10-CM

## 2019-08-20 NOTE — PROGRESS NOTES
Subjective: Harlow Dakins is a 77 y.o. female who presents for follow up of hypertension. New concerns: knee much better with voltaren gel, no falls lately. Son has been very ill, but [facility] is talk about sending him home once his cardiac status fully stabalizes. Current Outpatient Medications   Medication Sig Dispense Refill    diclofenac (VOLTAREN) 1 % gel Apply  to affected area every six (6) hours. 1 Each 0    busPIRone (BUSPAR) 5 mg tablet Take 1 Tab by mouth two (2) times a day. For nerves 180 Tab 1    alendronate (FOSAMAX) 70 mg tablet Take 1 Tab by mouth every seven (7) days. 12 Tab 1    lisinopril (PRINIVIL, ZESTRIL) 20 mg tablet Take 1 Tab by mouth daily. 90 Tab 1    raNITIdine (ZANTAC) 150 mg tablet TAKE ONE TABLET BY MOUTH TWICE DAILY FOR  ACID  REFLUX 180 Tab 1    traZODone (DESYREL) 50 mg tablet Take 1 Tab by mouth nightly. 90 Tab 1    Calcium-Cholecalciferol, D3, 500 mg(1,250mg) -400 unit tab Take  by mouth. Allergies   Allergen Reactions    Nsaids (Non-Steroidal Anti-Inflammatory Drug) Rash    Tolmetin Rash    Naprosyn [Naproxen] Nausea Only       Diet and Lifestyle: nonsmoker    Cardiovascular ROS: taking medications as instructed, no medication side effects noted, no TIA's, no chest pain on exertion, no dyspnea on exertion, no swelling of ankles. Review of Systems, additional:  Pertinent items are noted in HPI.       Patient Active Problem List    Diagnosis Date Noted    Hip fracture requiring operative repair, left, closed, with routine healing, subsequent encounter 06/11/2019    Irritable bladder 06/11/2019    Insomnia due to stress 06/11/2019    GERD (gastroesophageal reflux disease) 06/11/2019    Diverticulosis 05/08/2014    Age related osteoporosis 12/13/2012    Essential hypertension 05/11/2010    Hyperlipidemia 05/11/2010     Social History     Tobacco Use    Smoking status: Former Smoker     Packs/day: 1.00     Years: 0.00     Pack years: 0.00 Last attempt to quit: 1990     Years since quittin.3    Smokeless tobacco: Never Used   Substance Use Topics    Alcohol use: No     Alcohol/week: 0.0 standard drinks        Lab Results   Component Value Date/Time    WBC 7.7 05/15/2017 09:53 AM    HGB 11.5 05/15/2017 09:53 AM    HCT 35.9 05/15/2017 09:53 AM    PLATELET 168  09:53 AM    MCV 88 05/15/2017 09:53 AM     Lab Results   Component Value Date/Time    Cholesterol, total 231 (H) 2019 10:24 AM    HDL Cholesterol 44 2019 10:24 AM    LDL, calculated 149 (H) 2019 10:24 AM    Triglyceride 192 (H) 2019 10:24 AM     Lab Results   Component Value Date/Time    ALT (SGPT) 9 2016 02:19 PM    AST (SGOT) 13 2016 02:19 PM    Alk. phosphatase 68 2016 02:19 PM    Bilirubin, total 0.5 2016 02:19 PM    Albumin 4.2 2016 02:19 PM    Protein, total 6.7 2016 02:19 PM    INR 1.0 2014 09:34 AM    Prothrombin time 10.7 2014 09:34 AM    PLATELET 183  09:53 AM     Lab Results   Component Value Date/Time    GFR est non-AA 46 (L) 2019 10:27 AM    GFR est AA 53 (L) 2019 10:27 AM    Creatinine 1.14 (H) 2019 10:27 AM    BUN 27 2019 10:27 AM    Sodium 141 2019 10:27 AM    Potassium 5.3 (H) 2019 10:27 AM    Chloride 105 2019 10:27 AM    CO2 23 2019 10:27 AM     Lab Results   Component Value Date/Time    Glucose 107 (H) 2019 10:27 AM    Glucose  2017 01:54 PM             Objective:     Physical exam significant for the following:     eldelry NAD    Visit Vitals  /70 (BP 1 Location: Left arm, BP Patient Position: At rest) Comment (BP 1 Location): manual   Pulse 74   Temp 98.1 °F (36.7 °C) (Oral)   Resp 20   Ht 5' 1\" (1.549 m)   Wt 133 lb (60.3 kg)   SpO2 97%   BMI 25.13 kg/m²     Appearance: alert, well appearing, and in no distress.   General exam: CVS exam BP noted to be well controlled today in office, S1, S2 normal, no gallop, no murmur, chest clear, no JVD, no HSM, no edema. .   Assessment/Plan:     hypertension stable    . ICD-10-CM ICD-9-CM    1. Essential hypertension I10 401.9 ADMIN PNEUMOCOCCAL VACCINE   2. Encounter for immunization Z23 V03.89 PNEUMOCOCCAL POLYSACCHARIDE VACCINE, 23-VALENT, ADULT OR IMMUNOSUPPRESSED PT DOSE,      varicella-zoster recombinant, PF, (SHINGRIX) 50 mcg/0.5 mL susr injection      ADMIN PNEUMOCOCCAL VACCINE   3. Age related osteoporosis, unspecified pathological fracture presence M81.0 733.01 ADMIN PNEUMOCOCCAL VACCINE   4. Situational anxiety F41.8 300.09 ADMIN PNEUMOCOCCAL VACCINE     Orders Placed This Encounter    PNEUMOCOCCAL POLYSACCHARIDE VACCINE, 23-VALENT, ADULT OR IMMUNOSUPPRESSED PT DOSE,    ADMIN PNEUMOCOCCAL VACCINE    varicella-zoster recombinant, PF, (SHINGRIX) 50 mcg/0.5 mL susr injection     Si.5 mL by IntraMUSCular route once for 1 dose. Dispense:  0.5 mL     Refill:  0     Osteoporosis cont fosamax  Despite son's illness is doing OK  Follow-up and Dispositions    · Return in about 3 months (around 2019) for routine follow up.

## 2019-09-28 DIAGNOSIS — S89.91XA INJURY OF RIGHT KNEE, INITIAL ENCOUNTER: ICD-10-CM

## 2019-11-20 ENCOUNTER — OFFICE VISIT (OUTPATIENT)
Dept: INTERNAL MEDICINE CLINIC | Age: 79
End: 2019-11-20

## 2019-11-20 VITALS
RESPIRATION RATE: 16 BRPM | SYSTOLIC BLOOD PRESSURE: 112 MMHG | WEIGHT: 131 LBS | BODY MASS INDEX: 24.73 KG/M2 | HEIGHT: 61 IN | HEART RATE: 101 BPM | DIASTOLIC BLOOD PRESSURE: 56 MMHG | OXYGEN SATURATION: 96 % | TEMPERATURE: 97.7 F

## 2019-11-20 DIAGNOSIS — I10 ESSENTIAL HYPERTENSION: ICD-10-CM

## 2019-11-20 DIAGNOSIS — Z23 ENCOUNTER FOR IMMUNIZATION: Primary | ICD-10-CM

## 2019-11-20 DIAGNOSIS — J30.1 NON-SEASONAL ALLERGIC RHINITIS DUE TO POLLEN: ICD-10-CM

## 2019-11-20 RX ORDER — LISINOPRIL 10 MG/1
10 TABLET ORAL DAILY
Qty: 90 TAB | Refills: 3 | Status: SHIPPED | OUTPATIENT
Start: 2019-11-20 | End: 2020-02-11 | Stop reason: SDUPTHER

## 2019-11-20 RX ORDER — FLUTICASONE PROPIONATE 50 MCG
2 SPRAY, SUSPENSION (ML) NASAL DAILY
Qty: 1 BOTTLE | Refills: 1 | Status: SHIPPED | OUTPATIENT
Start: 2019-11-20 | End: 2020-06-03 | Stop reason: CLARIF

## 2019-11-20 NOTE — PROGRESS NOTES
Follow up for blood pressure - nose stuffiness at night - refill of diclofenac  Paulette Giron LPN  41/43/7506  8:23 AM

## 2019-11-20 NOTE — PROGRESS NOTES
Subjective: Mabel Agosto is a [de-identified] y.o. female who presents for follow up of hypertension. New concerns: head congestion x months, OTC have not helped. Otherwise feels okay her son is been quite ill seems to be making some recovery. Current Outpatient Medications   Medication Sig Dispense Refill    diclofenac (VOLTAREN) 1 % gel Apply  to affected area every six (6) hours. 1 Each 0    busPIRone (BUSPAR) 5 mg tablet Take 1 Tab by mouth two (2) times a day. For nerves 180 Tab 1    alendronate (FOSAMAX) 70 mg tablet Take 1 Tab by mouth every seven (7) days. 12 Tab 1    lisinopril (PRINIVIL, ZESTRIL) 20 mg tablet Take 1 Tab by mouth daily. 90 Tab 1    raNITIdine (ZANTAC) 150 mg tablet TAKE ONE TABLET BY MOUTH TWICE DAILY FOR  ACID  REFLUX 180 Tab 1    traZODone (DESYREL) 50 mg tablet Take 1 Tab by mouth nightly. 90 Tab 1    Calcium-Cholecalciferol, D3, 500 mg(1,250mg) -400 unit tab Take  by mouth. Allergies   Allergen Reactions    Nsaids (Non-Steroidal Anti-Inflammatory Drug) Rash    Tolmetin Rash    Naprosyn [Naproxen] Nausea Only       Diet and Lifestyle: nonsmoker    Cardiovascular ROS: taking medications as instructed, no medication side effects noted, no TIA's, no chest pain on exertion, no dyspnea on exertion, no swelling of ankles. Review of Systems, additional:  Pertinent items are noted in HPI.       Patient Active Problem List    Diagnosis Date Noted    Hip fracture requiring operative repair, left, closed, with routine healing, subsequent encounter 06/11/2019    Irritable bladder 06/11/2019    Insomnia due to stress 06/11/2019    GERD (gastroesophageal reflux disease) 06/11/2019    Diverticulosis 05/08/2014    Age related osteoporosis 12/13/2012    Essential hypertension 05/11/2010    Hyperlipidemia 05/11/2010     Social History     Tobacco Use    Smoking status: Former Smoker     Packs/day: 1.00     Years: 0.00     Pack years: 0.00     Last attempt to quit: 1990     Years since quittin.5    Smokeless tobacco: Never Used   Substance Use Topics    Alcohol use: No     Alcohol/week: 0.0 standard drinks        Lab Results   Component Value Date/Time    Cholesterol, total 231 (H) 2019 10:24 AM    HDL Cholesterol 44 2019 10:24 AM    LDL, calculated 149 (H) 2019 10:24 AM    Triglyceride 192 (H) 2019 10:24 AM     Lab Results   Component Value Date/Time    GFR est non-AA 46 (L) 2019 10:27 AM    GFR est AA 53 (L) 2019 10:27 AM    Creatinine 1.14 (H) 2019 10:27 AM    BUN 27 2019 10:27 AM    Sodium 141 2019 10:27 AM    Potassium 5.3 (H) 2019 10:27 AM    Chloride 105 2019 10:27 AM    CO2 23 2019 10:27 AM     Lab Results   Component Value Date/Time    Glucose 107 (H) 2019 10:27 AM    Glucose  2017 01:54 PM             Objective:     Physical exam significant for the following:     edelry    Visit Vitals  /80 (BP 1 Location: Left arm, BP Patient Position: At rest)   Pulse (!) 101   Temp 97.7 °F (36.5 °C) (Oral)   Resp 16   Ht 5' 1\" (1.549 m)   Wt 131 lb (59.4 kg)   SpO2 96%   BMI 24.75 kg/m²     Appearance: alert, well appearing, and in no distress. General exam: CVS exam BP noted to be well controlled today in office, S1, S2 normal, no gallop, no murmur, chest clear, no JVD, no HSM, no edema. .   Assessment/Plan:     hypertension stable too low. dec lisinopril  Trial of nasal steroids      ICD-10-CM ICD-9-CM    1. Encounter for immunization Z23 V03.89 INFLUENZA VACCINE INACTIVATED (IIV), SUBUNIT, ADJUVANTED, IM      ADMIN INFLUENZA VIRUS VAC   2. Essential hypertension I10 401.9 lisinopril (PRINIVIL, ZESTRIL) 10 mg tablet      METABOLIC PANEL, BASIC      ADMIN INFLUENZA VIRUS VAC   3.  Non-seasonal allergic rhinitis due to pollen J30.1 477.0 fluticasone propionate (FLONASE) 50 mcg/actuation nasal spray      ADMIN INFLUENZA VIRUS VAC     Orders Placed This Encounter    INFLUENZA VACCINE INACTIVATED (IIV), SUBUNIT, ADJUVANTED, IM    METABOLIC PANEL, BASIC     Standing Status:   Future     Standing Expiration Date:   2020    ADMIN INFLUENZA VIRUS VAC    lisinopril (PRINIVIL, ZESTRIL) 10 mg tablet     Sig: Take 1 Tab by mouth daily. Dispense:  90 Tab     Refill:  3    fluticasone propionate (FLONASE) 50 mcg/actuation nasal spray     Si Sprays by Both Nostrils route daily.      Dispense:  1 Bottle     Refill:  1     Follow-up and Dispositions    · Return in about 4 months (around 3/20/2020) for medicare annual.

## 2019-11-27 DIAGNOSIS — F51.02 INSOMNIA DUE TO PSYCHOLOGICAL STRESS: ICD-10-CM

## 2019-11-28 RX ORDER — OXYBUTYNIN CHLORIDE 5 MG/1
TABLET ORAL
Qty: 90 TAB | Refills: 0 | Status: SHIPPED | OUTPATIENT
Start: 2019-11-28 | End: 2020-02-03

## 2019-11-28 RX ORDER — TRAZODONE HYDROCHLORIDE 50 MG/1
TABLET ORAL
Qty: 90 TAB | Refills: 0 | Status: SHIPPED | OUTPATIENT
Start: 2019-11-28 | End: 2020-02-03

## 2019-11-28 RX ORDER — RANITIDINE 150 MG/1
TABLET, FILM COATED ORAL
Qty: 180 TAB | Refills: 0 | Status: SHIPPED | OUTPATIENT
Start: 2019-11-28 | End: 2020-02-03

## 2019-11-28 RX ORDER — BUSPIRONE HYDROCHLORIDE 5 MG/1
TABLET ORAL
Qty: 180 TAB | Refills: 0 | Status: SHIPPED | OUTPATIENT
Start: 2019-11-28 | End: 2020-02-03

## 2019-12-03 ENCOUNTER — CLINICAL SUPPORT (OUTPATIENT)
Dept: INTERNAL MEDICINE CLINIC | Age: 79
End: 2019-12-03

## 2019-12-03 VITALS
SYSTOLIC BLOOD PRESSURE: 144 MMHG | OXYGEN SATURATION: 98 % | RESPIRATION RATE: 16 BRPM | HEART RATE: 72 BPM | DIASTOLIC BLOOD PRESSURE: 84 MMHG

## 2019-12-03 DIAGNOSIS — I10 ESSENTIAL HYPERTENSION: Primary | ICD-10-CM

## 2019-12-03 NOTE — PROGRESS NOTES
Patient in for blood pressure check - manual check today was 144/84 left arm at rest - patient will follow up with Dr Brandon White as scheduled  Crys Rivas LPN  49/5/0097  55:12 AM

## 2020-02-03 DIAGNOSIS — F51.02 INSOMNIA DUE TO PSYCHOLOGICAL STRESS: ICD-10-CM

## 2020-02-03 RX ORDER — BUSPIRONE HYDROCHLORIDE 5 MG/1
TABLET ORAL
Qty: 180 TAB | Refills: 0 | Status: SHIPPED | OUTPATIENT
Start: 2020-02-03 | End: 2020-02-25 | Stop reason: SDUPTHER

## 2020-02-03 RX ORDER — OXYBUTYNIN CHLORIDE 5 MG/1
TABLET ORAL
Qty: 90 TAB | Refills: 0 | Status: SHIPPED | OUTPATIENT
Start: 2020-02-03 | End: 2020-02-25 | Stop reason: ALTCHOICE

## 2020-02-03 RX ORDER — TRAZODONE HYDROCHLORIDE 50 MG/1
TABLET ORAL
Qty: 90 TAB | Refills: 0 | Status: SHIPPED | OUTPATIENT
Start: 2020-02-03 | End: 2020-02-25 | Stop reason: SDUPTHER

## 2020-02-03 RX ORDER — RANITIDINE 150 MG/1
TABLET, FILM COATED ORAL
Qty: 180 TAB | Refills: 0 | Status: SHIPPED | OUTPATIENT
Start: 2020-02-03 | End: 2020-02-25 | Stop reason: SDUPTHER

## 2020-02-11 DIAGNOSIS — I10 ESSENTIAL HYPERTENSION: ICD-10-CM

## 2020-02-11 RX ORDER — LISINOPRIL 10 MG/1
10 TABLET ORAL DAILY
Qty: 90 TAB | Refills: 1 | Status: SHIPPED | OUTPATIENT
Start: 2020-02-11 | End: 2020-07-07 | Stop reason: SDUPTHER

## 2020-02-11 NOTE — TELEPHONE ENCOUNTER
Requested Prescriptions     Pending Prescriptions Disp Refills    lisinopril (PRINIVIL, ZESTRIL) 10 mg tablet 90 Tab 1     Sig: Take 1 Tab by mouth daily.      Patient now uses [address] for mail order - can we please send a new prescription for this to [address], LPN  2/44/8698  98:76 AM  Follow up 3/20/2020

## 2020-02-25 ENCOUNTER — OFFICE VISIT (OUTPATIENT)
Dept: INTERNAL MEDICINE CLINIC | Age: 80
End: 2020-02-25

## 2020-02-25 VITALS
HEART RATE: 79 BPM | SYSTOLIC BLOOD PRESSURE: 126 MMHG | OXYGEN SATURATION: 96 % | HEIGHT: 61 IN | BODY MASS INDEX: 24.92 KG/M2 | RESPIRATION RATE: 16 BRPM | WEIGHT: 132 LBS | TEMPERATURE: 97.2 F | DIASTOLIC BLOOD PRESSURE: 72 MMHG

## 2020-02-25 DIAGNOSIS — I10 ESSENTIAL HYPERTENSION: ICD-10-CM

## 2020-02-25 DIAGNOSIS — K21.9 GASTROESOPHAGEAL REFLUX DISEASE, ESOPHAGITIS PRESENCE NOT SPECIFIED: ICD-10-CM

## 2020-02-25 DIAGNOSIS — N32.89 IRRITABLE BLADDER: ICD-10-CM

## 2020-02-25 DIAGNOSIS — R22.0 MASS OF HEAD: Primary | ICD-10-CM

## 2020-02-25 DIAGNOSIS — S72.002D HIP FRACTURE REQUIRING OPERATIVE REPAIR, LEFT, CLOSED, WITH ROUTINE HEALING, SUBSEQUENT ENCOUNTER: ICD-10-CM

## 2020-02-25 DIAGNOSIS — F51.02 INSOMNIA DUE TO PSYCHOLOGICAL STRESS: ICD-10-CM

## 2020-02-25 DIAGNOSIS — M80.00XD AGE-RELATED OSTEOPOROSIS WITH CURRENT PATHOLOGICAL FRACTURE WITH ROUTINE HEALING, SUBSEQUENT ENCOUNTER: ICD-10-CM

## 2020-02-25 RX ORDER — TRAZODONE HYDROCHLORIDE 50 MG/1
TABLET ORAL
Qty: 90 TAB | Refills: 3 | Status: SHIPPED | OUTPATIENT
Start: 2020-02-25 | End: 2020-09-01

## 2020-02-25 RX ORDER — ALENDRONATE SODIUM 70 MG/1
70 TABLET ORAL
Qty: 12 TAB | Refills: 3 | Status: SHIPPED | OUTPATIENT
Start: 2020-02-25 | End: 2020-11-18 | Stop reason: SDUPTHER

## 2020-02-25 RX ORDER — RANITIDINE 150 MG/1
TABLET, FILM COATED ORAL
Qty: 180 TAB | Refills: 3 | Status: SHIPPED | OUTPATIENT
Start: 2020-02-25 | End: 2020-04-23 | Stop reason: RX

## 2020-02-25 RX ORDER — BUSPIRONE HYDROCHLORIDE 5 MG/1
TABLET ORAL
Qty: 180 TAB | Refills: 3 | Status: SHIPPED | OUTPATIENT
Start: 2020-02-25 | End: 2021-03-30

## 2020-02-25 NOTE — PROGRESS NOTES
Chief Complaint   Patient presents with    Cyst     head cyst x2 years     I have reviewed the patient's medical history in detail and updated the computerized patient record. Health Maintenance reviewed. 1. Have you been to the ER, urgent care clinic since your last visit? Hospitalized since your last visit?no    2. Have you seen or consulted any other health care providers outside of the [address] [place] [address] since your last visit? Include any pap smears or colon screening. No      Encouraged pt to discuss pt's wishes with spouse/partner/family and bring them in the next appt to follow thru with the Advanced Directive      Fall Risk Assessment, last 12 mths 2/25/2020   Able to walk? Yes   Fall in past 12 months? Yes   Fall with injury? Yes   Number of falls in past 12 months 2   Fall Risk Score 3       3 most recent PHQ Screens 2/25/2020   Little interest or pleasure in doing things Several days   Feeling down, depressed, irritable, or hopeless Several days   Total Score PHQ 2 2       Abuse Screening Questionnaire 2/25/2020   Do you ever feel afraid of your partner? N   Are you in a relationship with someone who physically or mentally threatens you? N   Is it safe for you to go home?  Y       ADL Assessment 2/25/2020   Feeding yourself No Help Needed   Getting from bed to chair No Help Needed   Getting dressed No Help Needed   Bathing or showering No Help Needed   Walk across the room (includes cane/walker) No Help Needed   Using the telphone No Help Needed   Taking your medications No Help Needed   Preparing meals No Help Needed   Managing money (expenses/bills) No Help Needed   Moderately strenuous housework (laundry) No Help Needed   Shopping for personal items (toiletries/medicines) No Help Needed   Shopping for groceries No Help Needed   Driving No Help Needed   Climbing a flight of stairs No Help Needed   Getting to places beyond walking distances No Help Needed

## 2020-02-25 NOTE — PATIENT INSTRUCTIONS
Kegel Exercises: Care Instructions Your Care Instructions Kegel exercises strengthen muscles around the bladder. These muscles control the flow of urine. Kegel exercises are sometime called \"pelvic floor\" exercises. They can help prevent urine leakage and keep the pelvic organs in place. A woman who just had a baby might want to try Kegel exercises. They can strengthen pelvic muscles that have been weakened by pregnancy and childbirth. A man or woman may use Kegel exercises to treat urine leakage. You do Kegel exercises by tightening the muscles you use when you urinate. You will likely need to do these exercises for several weeks to get better. Follow-up care is a key part of your treatment and safety. Be sure to make and go to all appointments, and call your doctor if you are having problems. It's also a good idea to know your test results and keep a list of the medicines you take. How can you care for yourself at home? · Do Kegel exercises. ? Find the muscles you need to strengthen. To do this, tighten the muscles that stop your urine while you are going to the bathroom. These are the same muscles you squeeze during Kegel exercises. ? Squeeze the muscles as hard as you can. Your belly and thighs should not move. ? Hold the squeeze for 3 seconds. Then relax for 3 seconds. ? Start with 3 seconds, and then add 1 second each week until you are able to squeeze for 10 seconds. ? Repeat the exercise 10 to 15 times for each session. Do three or more sessions each day. · You can check to see if you are using the right muscles. Place a finger in your vagina and squeeze around it. You are doing them right when you feel pressure around your finger. Your doctor may also suggest that you put special weights in your vagina while you do the exercises. · Do not smoke. It can irritate the bladder. If you need help quitting, talk to your doctor about stop-smoking programs and medicines.  These can increase your chances of quitting for good. Where can you learn more? Go to http://radha-zunilda.info/. Enter V935 in the search box to learn more about \"Kegel Exercises: Care Instructions. \" Current as of: December 19, 2018 Content Version: 12.2 © 1938-4800 [employer]. Care instructions adapted under license by [employer] (which disclaims liability or warranty for this information). If you have questions about a medical condition or this instruction, always ask your healthcare professional. [address] any warranty or liability for your use of this information.

## 2020-02-25 NOTE — PROGRESS NOTES
HISTORY OF PRESENT ILLNESS  Jose Francisco Brush is a [de-identified] y.o. female. Cyst   The history is provided by the patient. This is a chronic problem. Episode onset: 3 years or longer. The problem occurs daily. The problem has not changed since onset. Pertinent negatives include no chest pain and no shortness of breath. Associated symptoms comments: Left scalp lesion. Exacerbated by: comb or brush. Treatments tried: 1 x neosporin peroxide. The treatment provided no relief. Some issues with sleep lately, despite trazodone. No specic cause of the anxiety. 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 Respiratory: Negative for shortness of breath. Cardiovascular: Negative for chest pain.      Social History     Socioeconomic History    Marital status:      Spouse name: Not on file    Number of children: 3    Years of education: Not on file    Highest education level: Not on file   Occupational History    Occupation: [employer]     Comment: retired semi   Social Needs    Financial resource strain: Not on file    Food insecurity:     Worry: Not on file     Inability: Not on file   CustomMade needs:     Medical: Not on file     Non-medical: Not on file   Tobacco Use    Smoking status: Former Smoker     Packs/day: 1.00     Years: 0.00     Pack years: 0.00     Last attempt to quit: 1990     Years since quittin.8    Smokeless tobacco: Never Used   Substance and Sexual Activity    Alcohol use: No     Alcohol/week: 0.0 standard drinks    Drug use: No    Sexual activity: Never   Lifestyle    Physical activity:     Days per week: Not on file     Minutes per session: Not on file    Stress: Not on file   Relationships    Social connections:     Talks on phone: Not on file     Gets together: Not on file     Attends Bahai service: Not on file     Active member of club or organization: Not on file     Attends meetings of clubs or organizations: Not on file     Relationship status: Not on file    Intimate partner violence:     Fear of current or ex partner: Not on file     Emotionally abused: Not on file     Physically abused: Not on file     Forced sexual activity: Not on file   Other Topics Concern    Not on file   Social History Narrative    Son lives with her, son passed away  2016 of an aneurysm, now lives alone       Physical Exam   Visit Vitals  /72   Pulse 79   Temp 97.2 °F (36.2 °C) (Temporal)   Resp 16   Ht 5' 1\" (1.549 m)   Wt 132 lb (59.9 kg)   SpO2 96%   BMI 24.94 kg/m²     WD WN female NAD  Heart RRR without murmers clicks or rubs  Lungs CTA  Abdo soft nontender  Ext no edema    3x3 scalp lesion raised benign     ASSESSMENT and PLAN  Encounter Diagnoses   Name Primary?     Mass of head Yes    Irritable bladder     Insomnia due to psychological stress     Essential hypertension     Hip fracture requiring operative repair, left, closed, with routine healing, subsequent encounter     Age-related osteoporosis with current pathological fracture with routine healing, subsequent encounter     Gastroesophageal reflux disease, esophagitis presence not specified      Orders Placed This Encounter    REFERRAL TO SURGERY    traZODone (DESYREL) 50 mg tablet    busPIRone (BUSPAR) 5 mg tablet    alendronate (FOSAMAX) 70 mg tablet    raNITIdine (ZANTAC) 150 mg tablet    DISCONTD: mirabegron ER (MYRBETRIQ) 25 mg ER tablet    mirabegron ER (MYRBETRIQ) 25 mg ER tablet     Change bladder med to above less cholinergic fall risk  OK buspar for anxiety  Send to see surgeon    Follow-up and Dispositions    · Return in about 3 months (around 5/25/2020) for routine follow up.

## 2020-04-23 ENCOUNTER — TELEPHONE (OUTPATIENT)
Dept: INTERNAL MEDICINE CLINIC | Age: 80
End: 2020-04-23

## 2020-04-23 DIAGNOSIS — K21.9 GASTROESOPHAGEAL REFLUX DISEASE, ESOPHAGITIS PRESENCE NOT SPECIFIED: Primary | ICD-10-CM

## 2020-04-23 RX ORDER — OMEPRAZOLE 20 MG/1
20 CAPSULE, DELAYED RELEASE ORAL DAILY
Qty: 90 CAP | Refills: 1 | Status: SHIPPED | OUTPATIENT
Start: 2020-04-23 | End: 2020-09-17

## 2020-04-23 NOTE — TELEPHONE ENCOUNTER
[facility] that Ranitidine has been pulled from the market and is unavailable - will need a new prescription for either Omeprazole, Pantoprazole, Esomeprazole sent in to [address] to replace it  Stefany Cole LPN  1/77/0208  9:72 PM

## 2020-05-13 ENCOUNTER — TELEPHONE (OUTPATIENT)
Dept: INTERNAL MEDICINE CLINIC | Age: 80
End: 2020-05-13

## 2020-05-13 NOTE — TELEPHONE ENCOUNTER
Patient has bough a new car - she already has permanent handicap plates on her previous vehicle, this new car is an addition to that one - she is asking if  Dr Adrian Fontaine complete his portion of the handicap placard form to use in the new vehicle that she can take to [facility] - please notify her when this is complete and the form can be mailed to her home  Bethel Price LPN  5/01/4233  76:38 AM

## 2020-06-01 ENCOUNTER — TELEPHONE (OUTPATIENT)
Dept: INTERNAL MEDICINE CLINIC | Age: 80
End: 2020-06-01

## 2020-06-01 DIAGNOSIS — Z01.818 PRE-OP EXAM: ICD-10-CM

## 2020-06-01 DIAGNOSIS — I10 ESSENTIAL HYPERTENSION: Primary | ICD-10-CM

## 2020-06-01 NOTE — TELEPHONE ENCOUNTER
Patient saw Dr Alysia Zeepda this morning and is scheduled for surgery on June 12, 2020 - anesthesia needs an EKG and CBC and BMP done prior to this and an approval from her PCP - scheduled for a pre op with Dr Carine Mack on June 4, 2020 - please can we have an order for CBC and BMP to be done at [address]  8/6/1908  7:08 PM

## 2020-06-03 NOTE — PERIOP NOTES
Patient lives in [facility] and is going to have covid-19 test done at [facility] on 6/8.

## 2020-06-03 NOTE — PERIOP NOTES
[facility]  Preoperative Instructions        Surgery Date 6/12/20          Time of Arrival 0700    1. On the day of your surgery, please report to the Surgical Services Registration Desk and sign in at your designated time. The Surgery Center is located to the right of the Emergency Room. 2. You must have someone with you to drive you home. You should not drive a car for 24 hours following surgery. Please make arrangements for a friend or family member to stay with you for the first 24 hours after your surgery. 3. Do not have anything to eat or drink (including water, gum, mints, coffee, juice) after midnight ? 6/11/20? Rosalind Dye ? This may not apply to medications prescribed by your physician. ?(Please note below the special instructions with medications to take the morning of your procedure.)    4. We recommend you do not drink any alcoholic beverages for 24 hours before and after your surgery. 5. Contact your surgeons office for instructions on the following medications: non-steroidal anti-inflammatory drugs (i.e. Advil, Aleve), vitamins, and supplements. (Some surgeons will want you to stop these medications prior to surgery and others may allow you to take them)  **If you are currently taking Plavix, Coumadin, Aspirin and/or other blood-thinning agents, contact your surgeon for instructions. ** Your surgeon will partner with the physician prescribing these medications to determine if it is safe to stop or if you need to continue taking. Please do not stop taking these medications without instructions from your surgeon    6. Wear comfortable clothes. Wear glasses instead of contacts. Do not bring any money or jewelry. Please bring picture ID, insurance card, and any prearranged co-payment or hospital payment. Do not wear make-up, particularly mascara the morning of your surgery. Do not wear nail polish, particularly if you are having foot /hand surgery.   Wear your hair loose or down, no ponytails, buns, ojshua pins or clips. All body piercings must be removed. Please shower with antibacterial soap for three consecutive days before and on the morning of surgery, but do not apply any lotions, powders or deodorants after the shower on the day of surgery. Please use a fresh towels after each shower. Please sleep in clean clothes and change bed linens the night before surgery. Please do not shave for 48 hours prior to surgery. Shaving of the face is acceptable. 7. You should understand that if you do not follow these instructions your surgery may be cancelled. If your physical condition changes (I.e. fever, cold or flu) please contact your surgeon as soon as possible. 8. It is important that you be on time. If a situation occurs where you may be late, please call [phone] (OR Holding Area). 9. If you have any questions and or problems, please call [phone] (Pre-admission Testing). 10. Your surgery time may be subject to change. You will receive a phone call the evening prior if your time changes. 11.  If having outpatient surgery, you must have someone to drive you here, stay with you during the duration of your stay, and to drive you home at time of discharge. 12.   In an effort to improve the efficiency, privacy, and safety for all of our Pre-op patients visitors are not allowed in the Holding area. Once you arrive and are registered your family/visitors will be asked to remain in your vehicle. The Pre-op staff will call you when they are ready for you to enter the building and will explain to you and your family/visitors that the Pre-op phase is beginning. At this time, if your procedure is outpatient, your family member will be asked to stay in their vehicle until the surgery is complete and you are ready for discharge.  If you are going to be admitted after your surgery, once you are called to come inside the building, your family will be able to leave the parking lot. At this time the staff will also ask for your designated spokesperson information in the event that the physician or staff need to provide an update or obtain any pertinent information. The designated spokesperson will be notified if the physician needs to speak to family during the pre-operative phase.and/or in the post op phase. Special Instructions: covid testing on 6/8 at [address] EXCEPT:  lisinopril    I understand a pre-operative phone call will be made to verify my surgery time. In the event that I am not available, I give permission for a message to be left on my answering service and/or with another person?   Yes [phone]         ___________________      __________   _________    (Signature of Patient)             (Witness)                (Date and Time)

## 2020-06-04 LAB
ALBUMIN SERPL-MCNC: 4.1 G/DL (ref 3.7–4.7)
ALBUMIN/GLOB SERPL: 1.6 {RATIO} (ref 1.2–2.2)
ALP SERPL-CCNC: 86 IU/L (ref 39–117)
ALT SERPL-CCNC: 7 IU/L (ref 0–32)
AST SERPL-CCNC: 12 IU/L (ref 0–40)
BASOPHILS # BLD AUTO: 0.1 X10E3/UL (ref 0–0.2)
BASOPHILS NFR BLD AUTO: 1 %
BILIRUB SERPL-MCNC: 0.2 MG/DL (ref 0–1.2)
BUN SERPL-MCNC: 20 MG/DL (ref 8–27)
BUN/CREAT SERPL: 16 (ref 12–28)
CALCIUM SERPL-MCNC: 9.3 MG/DL (ref 8.7–10.3)
CHLORIDE SERPL-SCNC: 102 MMOL/L (ref 96–106)
CO2 SERPL-SCNC: 24 MMOL/L (ref 20–29)
CREAT SERPL-MCNC: 1.27 MG/DL (ref 0.57–1)
EOSINOPHIL # BLD AUTO: 0.3 X10E3/UL (ref 0–0.4)
EOSINOPHIL NFR BLD AUTO: 3 %
ERYTHROCYTE [DISTWIDTH] IN BLOOD BY AUTOMATED COUNT: 13.4 % (ref 11.7–15.4)
GLOBULIN SER CALC-MCNC: 2.6 G/DL (ref 1.5–4.5)
GLUCOSE SERPL-MCNC: 132 MG/DL (ref 65–99)
HCT VFR BLD AUTO: 33.9 % (ref 34–46.6)
HGB BLD-MCNC: 11.2 G/DL (ref 11.1–15.9)
IMM GRANULOCYTES # BLD AUTO: 0 X10E3/UL (ref 0–0.1)
IMM GRANULOCYTES NFR BLD AUTO: 0 %
INTERPRETATION: NORMAL
LYMPHOCYTES # BLD AUTO: 2.1 X10E3/UL (ref 0.7–3.1)
LYMPHOCYTES NFR BLD AUTO: 22 %
MCH RBC QN AUTO: 28.8 PG (ref 26.6–33)
MCHC RBC AUTO-ENTMCNC: 33 G/DL (ref 31.5–35.7)
MCV RBC AUTO: 87 FL (ref 79–97)
MONOCYTES # BLD AUTO: 0.8 X10E3/UL (ref 0.1–0.9)
MONOCYTES NFR BLD AUTO: 8 %
NEUTROPHILS # BLD AUTO: 6.3 X10E3/UL (ref 1.4–7)
NEUTROPHILS NFR BLD AUTO: 66 %
PLATELET # BLD AUTO: 278 X10E3/UL (ref 150–450)
POTASSIUM SERPL-SCNC: 4.3 MMOL/L (ref 3.5–5.2)
PROT SERPL-MCNC: 6.7 G/DL (ref 6–8.5)
RBC # BLD AUTO: 3.89 X10E6/UL (ref 3.77–5.28)
SODIUM SERPL-SCNC: 138 MMOL/L (ref 134–144)
WBC # BLD AUTO: 9.7 X10E3/UL (ref 3.4–10.8)

## 2020-06-08 ENCOUNTER — OFFICE VISIT (OUTPATIENT)
Dept: PRIMARY CARE CLINIC | Age: 80
End: 2020-06-08

## 2020-06-08 DIAGNOSIS — Z20.828 EXPOSURE TO SARS-ASSOCIATED CORONAVIRUS: Primary | ICD-10-CM

## 2020-06-09 ENCOUNTER — DOCUMENTATION ONLY (OUTPATIENT)
Dept: INTERNAL MEDICINE CLINIC | Age: 80
End: 2020-06-09

## 2020-06-09 ENCOUNTER — OFFICE VISIT (OUTPATIENT)
Dept: INTERNAL MEDICINE CLINIC | Age: 80
End: 2020-06-09

## 2020-06-09 VITALS
OXYGEN SATURATION: 95 % | DIASTOLIC BLOOD PRESSURE: 84 MMHG | BODY MASS INDEX: 24.7 KG/M2 | TEMPERATURE: 98 F | WEIGHT: 139.4 LBS | SYSTOLIC BLOOD PRESSURE: 138 MMHG | RESPIRATION RATE: 16 BRPM | HEART RATE: 82 BPM | HEIGHT: 63 IN

## 2020-06-09 DIAGNOSIS — F41.8 SITUATIONAL ANXIETY: ICD-10-CM

## 2020-06-09 DIAGNOSIS — Z01.818 PREOPERATIVE GENERAL PHYSICAL EXAMINATION: ICD-10-CM

## 2020-06-09 DIAGNOSIS — Z01.818 PRE-OP EXAM: Primary | ICD-10-CM

## 2020-06-09 DIAGNOSIS — I10 ESSENTIAL HYPERTENSION: ICD-10-CM

## 2020-06-09 DIAGNOSIS — N32.89 IRRITABLE BLADDER: ICD-10-CM

## 2020-06-09 DIAGNOSIS — C44.40 SKIN CANCER OF SCALP: ICD-10-CM

## 2020-06-09 NOTE — PROGRESS NOTES
Faxed EKG and clearance notes to [facility] for [facility] on 6/12/2020 - faxed to [phone] - confirmation of receipt received  Chip Maria LPN  9/1/6273  8:27 PM

## 2020-06-09 NOTE — PROGRESS NOTES
.  Chief Complaint   Patient presents with    Pre-op Exam     1. Have you been to the ER, urgent care clinic since your last visit? Hospitalized since your last visit? no    2. Have you seen or consulted any other health care providers outside of the [address] [place] [address] since your last visit? Include any pap smears or colon screening.  No  .Dalia Crimes

## 2020-06-09 NOTE — TELEPHONE ENCOUNTER
Send normal/stable results letter. Your results are normal/stable. If not signed up, consider getting my chart to get your results on-line. We can help you to sign up. Mildly elevated blood sugar, watch sweet intake.

## 2020-06-09 NOTE — PROGRESS NOTES
Preoperative Evaluation    Date of Exam: 2020    Sweta Mejia is a [de-identified] y.o. female (:1940) who presents for preoperative evaluation. Leoncio is going to remove skin CA, had corona testing  Surgery 20    Latex Allergy: no    Problem List:     Patient Active Problem List    Diagnosis Date Noted    Hip fracture requiring operative repair, left, closed, with routine healing, subsequent encounter 2019    Irritable bladder 2019    Insomnia due to stress 2019    GERD (gastroesophageal reflux disease) 2019    Diverticulosis 2014    Age related osteoporosis 2012    Essential hypertension 2010    Hyperlipidemia 2010     Medical History:     Past Medical History:   Diagnosis Date    Arthritis     Carotid stenosis, bilateral     <50%    Chronic sinusitis     Diverticulosis     GERD (gastroesophageal reflux disease)     Hyperlipidemia 2010    Hypertension     Insomnia due to stress     Irritable bladder     Osteoporosis     Prediabetes     Psychiatric disorder     anxiety    Shingles      Allergies: Allergies   Allergen Reactions    Nsaids (Non-Steroidal Anti-Inflammatory Drug) Rash    Tolmetin Rash    Naprosyn [Naproxen] Nausea Only      Medications:     Current Outpatient Medications   Medication Sig    omeprazole (PRILOSEC) 20 mg capsule Take 1 Cap by mouth daily.  oxybutynin (DITROPAN) 5 mg tablet TAKE 1 TABLET EVERY NIGHT    traZODone (DESYREL) 50 mg tablet TAKE 1 TABLET EVERY NIGHT    busPIRone (BUSPAR) 5 mg tablet TAKE 1 TABLET TWICE DAILY FOR NERVES    alendronate (FOSAMAX) 70 mg tablet Take 1 Tab by mouth every seven (7) days.  lisinopril (PRINIVIL, ZESTRIL) 10 mg tablet Take 1 Tab by mouth daily.  Calcium-Cholecalciferol, D3, 500 mg(1,250mg) -400 unit tab Take  by mouth. No current facility-administered medications for this visit.       Surgical History:     Past Surgical History:   Procedure Laterality Date    ABDOMEN SURGERY PROC UNLISTED      HX CATARACT REMOVAL      HX COLECTOMY  2014    for a perf    HX COLONOSCOPY      with EGD    HX GYN      hysterectomy    HX GYN      vaginal delivery x 3    HX HIP FRACTURE TX Left 2018    HX KNEE REPLACEMENT Left 2016    HX ORTHOPAEDIC Left 2017    Hip Fx Rx       Social History:     Social History     Socioeconomic History    Marital status:      Spouse name: Not on file    Number of children: 3    Years of education: Not on file    Highest education level: Not on file   Occupational History    Occupation:      Comment: retired semi   Tobacco Use    Smoking status: Former Smoker     Packs/day: 1.00     Years: 0.00     Pack years: 0.00     Last attempt to quit: 1990     Years since quittin.1    Smokeless tobacco: Never Used   Substance and Sexual Activity    Alcohol use: No     Alcohol/week: 0.0 standard drinks    Drug use: No    Sexual activity: Never   Social History Narrative    Son lives with her, son passed away  2016 of an aneurysm, now lives alone       Anesthesia Complications: None  History of abnormal bleeding : None  History of Blood Transfusions: yes and with german many yras ago  Health Care Directive or Living Will: no    Reports taking blood pressure medications without side affects. No complaints of exertional chest pain, excessive shortness of breath or focal weakness. Minimal swelling in lower legs or dizziness with standing. Objective:       Visit Vitals  /84   Pulse 82   Temp 98 °F (36.7 °C) (Oral)   Resp 16   Ht 5' 3\" (1.6 m)   Wt 139 lb 6.4 oz (63.2 kg)   SpO2 95%   BMI 24.69 kg/m²     WD WN female NAD  Heart RRR without murmers clicks or rubs  Lungs CTA  Abdo soft nontender  Ext no edema  Hard 2-3 cm firm mass left parietal area      DIAGNOSTICS:   1.  EKG: EKG FINDINGS - unchanged from previous tracings, normal sinus rhythm, nonspecific ST and T waves changes  3. Labs:   Lab Results   Component Value Date/Time    WBC 9.7 06/01/2020 03:17 PM    HGB 11.2 06/01/2020 03:17 PM    HCT 33.9 (L) 06/01/2020 03:17 PM    PLATELET 239 46/27/5730 03:17 PM    MCV 87 06/01/2020 03:17 PM     Lab Results   Component Value Date/Time    ALT (SGPT) 7 06/01/2020 03:17 PM    Alk. phosphatase 86 06/01/2020 03:17 PM    Bilirubin, total 0.2 06/01/2020 03:17 PM    Albumin 4.1 06/01/2020 03:17 PM    Protein, total 6.7 06/01/2020 03:17 PM    INR 1.0 05/01/2014 09:34 AM    Prothrombin time 10.7 05/01/2014 09:34 AM    PLATELET 394 80/99/8193 03:17 PM     Lab Results   Component Value Date/Time    GFR est non-AA 40 (L) 06/01/2020 03:17 PM    GFR est AA 46 (L) 06/01/2020 03:17 PM    Creatinine 1.27 (H) 06/01/2020 03:17 PM    BUN 20 06/01/2020 03:17 PM    Sodium 138 06/01/2020 03:17 PM    Potassium 4.3 06/01/2020 03:17 PM    Chloride 102 06/01/2020 03:17 PM    CO2 24 06/01/2020 03:17 PM     Lab Results   Component Value Date/Time    Glucose 132 (H) 06/01/2020 03:17 PM    Glucose  04/04/2017 01:54 PM        IMPRESSION:   Low risk for planned surgery  No contraindications to planned surgery      ICD-10-CM ICD-9-CM    1. Pre-op exam Z01.818 V72.84 AMB POC EKG ROUTINE W/ 12 LEADS, INTER & REP      AMB POC EKG ROUTINE W/ 12 LEADS, INTER & REP   2. Preoperative general physical examination Z01.818 V72.83    3. Skin cancer of scalp C44.40 173.40    4. Essential hypertension I10 401.9    5. Situational anxiety F41.8 300.09    6.  Irritable bladder N32.89 596.89      Orders Placed This Encounter    AMB POC EKG ROUTINE W/ 12 LEADS, INTER & REP     Order Specific Question:   Reason for Exam:     Answer:   pre op       Nabila Cannon MD   6/9/2020

## 2020-06-10 LAB — SARS-COV-2, NAA: NOT DETECTED

## 2020-06-12 ENCOUNTER — ANESTHESIA (OUTPATIENT)
Dept: SURGERY | Age: 80
End: 2020-06-12
Payer: MEDICARE

## 2020-06-12 ENCOUNTER — ANESTHESIA EVENT (OUTPATIENT)
Dept: SURGERY | Age: 80
End: 2020-06-12
Payer: MEDICARE

## 2020-06-12 ENCOUNTER — HOSPITAL ENCOUNTER (OUTPATIENT)
Age: 80
Setting detail: OUTPATIENT SURGERY
Discharge: HOME | End: 2020-06-12
Attending: PLASTIC SURGERY | Admitting: PLASTIC SURGERY
Payer: MEDICARE

## 2020-06-12 VITALS
DIASTOLIC BLOOD PRESSURE: 54 MMHG | BODY MASS INDEX: 24.06 KG/M2 | RESPIRATION RATE: 16 BRPM | WEIGHT: 135.8 LBS | OXYGEN SATURATION: 100 % | TEMPERATURE: 98 F | HEIGHT: 63 IN | SYSTOLIC BLOOD PRESSURE: 161 MMHG | HEART RATE: 62 BPM

## 2020-06-12 DIAGNOSIS — C44.42 SQUAMOUS CELL CARCINOMA OF SCALP: Primary | ICD-10-CM

## 2020-06-12 PROCEDURE — 77030018836 HC SOL IRR NACL ICUM -A: Performed by: PLASTIC SURGERY

## 2020-06-12 PROCEDURE — 74011250636 HC RX REV CODE- 250/636: Performed by: ANESTHESIOLOGY

## 2020-06-12 PROCEDURE — 77030002986 HC SUT PROL J&J -A: Performed by: PLASTIC SURGERY

## 2020-06-12 PROCEDURE — 76010000138 HC OR TIME 0.5 TO 1 HR: Performed by: PLASTIC SURGERY

## 2020-06-12 PROCEDURE — 74011250636 HC RX REV CODE- 250/636: Performed by: NURSE ANESTHETIST, CERTIFIED REGISTERED

## 2020-06-12 PROCEDURE — 74011000250 HC RX REV CODE- 250: Performed by: NURSE ANESTHETIST, CERTIFIED REGISTERED

## 2020-06-12 PROCEDURE — 77030031139 HC SUT VCRL2 J&J -A: Performed by: PLASTIC SURGERY

## 2020-06-12 PROCEDURE — 77030040361 HC SLV COMPR DVT MDII -B: Performed by: PLASTIC SURGERY

## 2020-06-12 PROCEDURE — 74011250637 HC RX REV CODE- 250/637: Performed by: PLASTIC SURGERY

## 2020-06-12 PROCEDURE — 76060000032 HC ANESTHESIA 0.5 TO 1 HR: Performed by: PLASTIC SURGERY

## 2020-06-12 PROCEDURE — 74011000250 HC RX REV CODE- 250: Performed by: PLASTIC SURGERY

## 2020-06-12 PROCEDURE — 76210000020 HC REC RM PH II FIRST 0.5 HR: Performed by: PLASTIC SURGERY

## 2020-06-12 PROCEDURE — 74011250637 HC RX REV CODE- 250/637: Performed by: ANESTHESIOLOGY

## 2020-06-12 PROCEDURE — 88331 PATH CONSLTJ SURG 1 BLK 1SPC: CPT

## 2020-06-12 PROCEDURE — 76210000063 HC OR PH I REC FIRST 0.5 HR: Performed by: PLASTIC SURGERY

## 2020-06-12 PROCEDURE — 77030013629 HC ELECTRD NDL STRY -B: Performed by: PLASTIC SURGERY

## 2020-06-12 PROCEDURE — 77030002996 HC SUT SLK J&J -A: Performed by: PLASTIC SURGERY

## 2020-06-12 PROCEDURE — 77030011640 HC PAD GRND REM COVD -A: Performed by: PLASTIC SURGERY

## 2020-06-12 PROCEDURE — 88305 TISSUE EXAM BY PATHOLOGIST: CPT

## 2020-06-12 RX ORDER — LIDOCAINE HYDROCHLORIDE 10 MG/ML
0.1 INJECTION, SOLUTION EPIDURAL; INFILTRATION; INTRACAUDAL; PERINEURAL AS NEEDED
Status: DISCONTINUED | OUTPATIENT
Start: 2020-06-12 | End: 2020-06-12 | Stop reason: HOSPADM

## 2020-06-12 RX ORDER — FENTANYL CITRATE 50 UG/ML
25 INJECTION, SOLUTION INTRAMUSCULAR; INTRAVENOUS
Status: CANCELLED | OUTPATIENT
Start: 2020-06-12

## 2020-06-12 RX ORDER — LIDOCAINE HYDROCHLORIDE 20 MG/ML
INJECTION, SOLUTION EPIDURAL; INFILTRATION; INTRACAUDAL; PERINEURAL AS NEEDED
Status: DISCONTINUED | OUTPATIENT
Start: 2020-06-12 | End: 2020-06-12 | Stop reason: HOSPADM

## 2020-06-12 RX ORDER — LABETALOL HYDROCHLORIDE 5 MG/ML
INJECTION, SOLUTION INTRAVENOUS AS NEEDED
Status: DISCONTINUED | OUTPATIENT
Start: 2020-06-12 | End: 2020-06-12 | Stop reason: HOSPADM

## 2020-06-12 RX ORDER — KETAMINE HYDROCHLORIDE 100 MG/ML
INJECTION, SOLUTION INTRAMUSCULAR; INTRAVENOUS AS NEEDED
Status: DISCONTINUED | OUTPATIENT
Start: 2020-06-12 | End: 2020-06-12 | Stop reason: HOSPADM

## 2020-06-12 RX ORDER — SODIUM CHLORIDE 0.9 % (FLUSH) 0.9 %
5-40 SYRINGE (ML) INJECTION EVERY 8 HOURS
Status: DISCONTINUED | OUTPATIENT
Start: 2020-06-12 | End: 2020-06-12 | Stop reason: HOSPADM

## 2020-06-12 RX ORDER — SODIUM CHLORIDE 0.9 % (FLUSH) 0.9 %
5-40 SYRINGE (ML) INJECTION AS NEEDED
Status: CANCELLED | OUTPATIENT
Start: 2020-06-12

## 2020-06-12 RX ORDER — SODIUM CHLORIDE, SODIUM LACTATE, POTASSIUM CHLORIDE, CALCIUM CHLORIDE 600; 310; 30; 20 MG/100ML; MG/100ML; MG/100ML; MG/100ML
50 INJECTION, SOLUTION INTRAVENOUS CONTINUOUS
Status: DISCONTINUED | OUTPATIENT
Start: 2020-06-12 | End: 2020-06-12 | Stop reason: HOSPADM

## 2020-06-12 RX ORDER — PROPOFOL 10 MG/ML
INJECTION, EMULSION INTRAVENOUS
Status: DISCONTINUED | OUTPATIENT
Start: 2020-06-12 | End: 2020-06-12 | Stop reason: HOSPADM

## 2020-06-12 RX ORDER — SODIUM CHLORIDE 0.9 % (FLUSH) 0.9 %
5-40 SYRINGE (ML) INJECTION AS NEEDED
Status: DISCONTINUED | OUTPATIENT
Start: 2020-06-12 | End: 2020-06-12 | Stop reason: HOSPADM

## 2020-06-12 RX ORDER — SODIUM CHLORIDE, SODIUM LACTATE, POTASSIUM CHLORIDE, CALCIUM CHLORIDE 600; 310; 30; 20 MG/100ML; MG/100ML; MG/100ML; MG/100ML
25 INJECTION, SOLUTION INTRAVENOUS CONTINUOUS
Status: DISCONTINUED | OUTPATIENT
Start: 2020-06-12 | End: 2020-06-12 | Stop reason: HOSPADM

## 2020-06-12 RX ORDER — SODIUM CHLORIDE, SODIUM LACTATE, POTASSIUM CHLORIDE, CALCIUM CHLORIDE 600; 310; 30; 20 MG/100ML; MG/100ML; MG/100ML; MG/100ML
INJECTION, SOLUTION INTRAVENOUS
Status: DISCONTINUED | OUTPATIENT
Start: 2020-06-12 | End: 2020-06-12 | Stop reason: HOSPADM

## 2020-06-12 RX ORDER — HYDROCODONE BITARTRATE AND ACETAMINOPHEN 5; 325 MG/1; MG/1
1 TABLET ORAL
Qty: 10 TAB | Refills: 0 | Status: SHIPPED | OUTPATIENT
Start: 2020-06-12 | End: 2020-06-12 | Stop reason: SDUPTHER

## 2020-06-12 RX ORDER — HYDROCODONE BITARTRATE AND ACETAMINOPHEN 5; 325 MG/1; MG/1
1 TABLET ORAL
Qty: 10 TAB | Refills: 0 | Status: SHIPPED | OUTPATIENT
Start: 2020-06-12 | End: 2020-06-15

## 2020-06-12 RX ORDER — FENTANYL CITRATE 50 UG/ML
50 INJECTION, SOLUTION INTRAMUSCULAR; INTRAVENOUS AS NEEDED
Status: DISCONTINUED | OUTPATIENT
Start: 2020-06-12 | End: 2020-06-12 | Stop reason: HOSPADM

## 2020-06-12 RX ORDER — ACETAMINOPHEN 325 MG/1
650 TABLET ORAL ONCE
Status: COMPLETED | OUTPATIENT
Start: 2020-06-12 | End: 2020-06-12

## 2020-06-12 RX ORDER — SODIUM CHLORIDE 0.9 % (FLUSH) 0.9 %
5-40 SYRINGE (ML) INJECTION EVERY 8 HOURS
Status: CANCELLED | OUTPATIENT
Start: 2020-06-12

## 2020-06-12 RX ORDER — ONDANSETRON 2 MG/ML
4 INJECTION INTRAMUSCULAR; INTRAVENOUS AS NEEDED
Status: CANCELLED | OUTPATIENT
Start: 2020-06-12

## 2020-06-12 RX ORDER — PROPOFOL 10 MG/ML
INJECTION, EMULSION INTRAVENOUS AS NEEDED
Status: DISCONTINUED | OUTPATIENT
Start: 2020-06-12 | End: 2020-06-12 | Stop reason: HOSPADM

## 2020-06-12 RX ORDER — HYDROMORPHONE HYDROCHLORIDE 1 MG/ML
0.2 INJECTION, SOLUTION INTRAMUSCULAR; INTRAVENOUS; SUBCUTANEOUS
Status: CANCELLED | OUTPATIENT
Start: 2020-06-12

## 2020-06-12 RX ORDER — MIDAZOLAM HYDROCHLORIDE 1 MG/ML
1 INJECTION, SOLUTION INTRAMUSCULAR; INTRAVENOUS AS NEEDED
Status: DISCONTINUED | OUTPATIENT
Start: 2020-06-12 | End: 2020-06-12 | Stop reason: HOSPADM

## 2020-06-12 RX ADMIN — PROPOFOL 10 MG: 10 INJECTION, EMULSION INTRAVENOUS at 08:35

## 2020-06-12 RX ADMIN — SODIUM CHLORIDE, SODIUM LACTATE, POTASSIUM CHLORIDE, AND CALCIUM CHLORIDE 50 ML/HR: 600; 310; 30; 20 INJECTION, SOLUTION INTRAVENOUS at 07:45

## 2020-06-12 RX ADMIN — PROPOFOL 50 MCG/KG/MIN: 10 INJECTION, EMULSION INTRAVENOUS at 08:29

## 2020-06-12 RX ADMIN — PROPOFOL 10 MG: 10 INJECTION, EMULSION INTRAVENOUS at 08:30

## 2020-06-12 RX ADMIN — PROPOFOL 10 MG: 10 INJECTION, EMULSION INTRAVENOUS at 08:36

## 2020-06-12 RX ADMIN — KETAMINE HYDROCHLORIDE 10 MG: 100 INJECTION, SOLUTION, CONCENTRATE INTRAMUSCULAR; INTRAVENOUS at 08:29

## 2020-06-12 RX ADMIN — SODIUM CHLORIDE, POTASSIUM CHLORIDE, SODIUM LACTATE AND CALCIUM CHLORIDE: 600; 310; 30; 20 INJECTION, SOLUTION INTRAVENOUS at 08:35

## 2020-06-12 RX ADMIN — Medication 3 AMPULE: at 07:46

## 2020-06-12 RX ADMIN — KETAMINE HYDROCHLORIDE 15 MG: 100 INJECTION, SOLUTION, CONCENTRATE INTRAMUSCULAR; INTRAVENOUS at 08:33

## 2020-06-12 RX ADMIN — LABETALOL HYDROCHLORIDE 10 MG: 5 INJECTION, SOLUTION INTRAVENOUS at 08:46

## 2020-06-12 RX ADMIN — LIDOCAINE HYDROCHLORIDE 100 MG: 20 INJECTION, SOLUTION INTRAVENOUS at 08:31

## 2020-06-12 RX ADMIN — ACETAMINOPHEN 650 MG: 325 TABLET, FILM COATED ORAL at 07:46

## 2020-06-12 RX ADMIN — PROPOFOL 10 MG: 10 INJECTION, EMULSION INTRAVENOUS at 08:32

## 2020-06-12 NOTE — PROGRESS NOTES
Patient discharged to home  Iv removed.   Discharge instructions, script, and medication education done with patient and daughter

## 2020-06-12 NOTE — PERIOP NOTES
Handoff Report from Operating Room to PACU    Report received from [facility] and [facility] regarding Alberto Batista. Surgeon(s):  Tyler Coronel MD  And Procedure(s) (LRB):  EXCISION SCALP LESION (N/A)  confirmed   with allergies and dressings discussed. Anesthesia type, drugs, patient history, complications, estimated blood loss, vital signs, intake and output, and last pain medication, lines and temperature were reviewed. 9:50 AM  Dr Chelita Basilio at bedside for sign out   Report off to Fermín Parekh RN in Phase II for d/c  Belongings sent with pt. Daughter Femi Lawton [phone] to transport home.

## 2020-06-12 NOTE — ANESTHESIA PREPROCEDURE EVALUATION
Anesthetic History   No history of anesthetic complications            Review of Systems / Medical History  Patient summary reviewed, nursing notes reviewed and pertinent labs reviewed    Pulmonary  Within defined limits                 Neuro/Psych   Within defined limits           Cardiovascular    Hypertension          PAD    Exercise tolerance: >4 METS  Comments: Hyperlipidemia  Carotid stenosis, bilateral   GI/Hepatic/Renal     GERD          Comments: COLOVAGINAL FISTULA Endo/Other        Arthritis and anemia     Other Findings   Comments: Knee pain, right (719.46)         Chronic sinusitis (473.9)         Other ill-defined conditions (799.89) L rib cage area shingles 4/17/2014   Osteoporosis           Physical Exam    Airway  Mallampati: II    Neck ROM: normal range of motion   Mouth opening: Normal     Cardiovascular  Regular rate and rhythm,  S1 and S2 normal,  no murmur, click, rub, or gallop             Dental    Dentition: Upper partial plate     Pulmonary  Breath sounds clear to auscultation               Abdominal  GI exam deferred       Other Findings            Anesthetic Plan    ASA: 3  Anesthesia type: MAC          Induction: Intravenous  Anesthetic plan and risks discussed with: Patient

## 2020-06-12 NOTE — OP NOTES
[address]  OPERATIVE REPORT    Name:  Ivory Stauffer  MR#:  [id number]  :  1940  ACCOUNT #:  [de-identified]  DATE OF SERVICE:  2020    PREOPERATIVE DIAGNOSIS:  Squamous carcinoma of left scalp. POSTOPERATIVE DIAGNOSIS:  Squamous carcinoma of left scalp. PROCEDURE PERFORMED:  Excision of left scalp squamous carcinoma measuring 4 x 5 cm with rotation flap reconstruction measuring 7 x 6 cm. SURGEON:  Brie Charles MD    ASSISTANT:  Huyen Calderon. ANESTHESIA:  Local with IV sedation. COMPLICATIONS:  None. SPECIMENS REMOVED:  Left scalp squamous carcinoma. IMPLANTS:  None. ESTIMATED BLOOD LOSS:  Less than 10 mL. INDICATIONS:  This 67-year-old white female presented with an exophytic lesion of the left scalp. Biopsy showed squamous carcinoma. Excision and reconstruction were indicated. PROCEDURE:  After informed consent was obtained, under IV sedation, the operative site was infiltrated with 1% lidocaine with epinephrine and then prepped and draped. Under loupe magnification, the lesion was excised with 3-4 mm peripheral margins down to galea. It was marked with a single stitch anteriorly and sent for frozen section, which revealed squamous carcinoma with free margins. Attention was turned to the reconstruction. This defect could not be closed primarily. A curving, posteriorly based rotation flap was designed and incised by incising from the anteromedial aspect of the defect into the right scalp. The flap was sharply elevated in the subgaleal plane, as were the margins of the defect. The flap was scored in a checkerboard fashion, allowing it to expand. Hemostasis was obtained. The flap was rotated into position and secured with 3-0 Vicryl and 3-0 Prolene. The donor site was closed in similar fashion and with staples. A small dog ear was excised at the pivot point and closed similarly.   Her hair was washed, and she was transferred to the recovery room in good condition.       MD SVITLANA [email or web address]  D:  06/12/2020 10:43  T:  06/12/2020 15:19  JOB #:  [id number]  CC:  MD Misael Singer MD

## 2020-06-12 NOTE — DISCHARGE INSTRUCTIONS
May shower tomorrow and wash hair normally. Keep incisions clean. Keep head elevated as much as possible. No heavy lifting or vigorous activity for a few days. DISCHARGE SUMMARY from Nurse    PATIENT INSTRUCTIONS:    After general anesthesia or intravenous sedation, for 24 hours or while taking prescription Narcotics:  · Limit your activities  · Do not drive and operate hazardous machinery  · Do not make important personal or business decisions  · Do  not drink alcoholic beverages  · If you have not urinated within 8 hours after discharge, please contact your surgeon on call. Report the following to your surgeon:  · Excessive pain, swelling, redness or odor of or around the surgical area  · Temperature over 100.5  · Nausea and vomiting lasting longer than 4 hours or if unable to take medications  · Any signs of decreased circulation or nerve impairment to extremity: change in color, persistent  numbness, tingling, coldness or increase pain  · Any questions    TO PREVENT AN INFECTION      1. [address] YOUR HANDS     To prevent infection, good handwashing is the most important thing you or your caregiver can do.  Wash your hands with soap and water or use the hand  we gave you before you touch any wounds. 2. SHOWER     Use the antibacterial soap we gave you when you take a shower.  Shower with this soap until your wounds are healed.  To reach all areas of your body, you may need someone to help you.  Dont forget to clean your belly button with every shower. 3.  USE CLEAN SHEETS     Use freshly cleaned sheets on your bed after surgery.  To keep the surgery site clean, do not allow pets to sleep with you while your wound is still healing. 4. STOP SMOKING     Stop smoking, or at least cut back on smoking     Smoking slows your healing. 5.  CONTROL YOUR BLOOD SUGAR     High blood sugars slow wound healing.      If you are diabetic, control your blood sugar levels before and after your surgery. A common side effect of anesthesia following surgery is nausea and/or vomiting. In order to decrease symptoms, it is wise to avoid foods that are high in fat, greasy foods, milk products, and spicy foods for the first 24 hours. Acceptable foods for the first 24 hours following surgery include but are not limited to:     soup   broth    toast    crackers    applesauce    bananas    mashed potatoes,   soft or scrambled eggs   oatmeal    jello    It is important to eat when taking your pain medication. This will help to prevent nausea. If possible, please try to time your meals with your medications. It is very important to stay hydrated following surgery. Sip fluids frequently while awake. Avoid acidic drinks such as citrus juices and soda for 24 hours. Carbonated beverages may cause bloating and gas. Acceptable fluids include:    - water (flavor packets may add variety)  - coffee or tea (in moderation)  - Gatorade  - John Paul-aid  - apple juice  - cranberry juice    You are encouraged to cough and deep breathe every hour when awake. This will help to prevent respiratory complications following anesthesia. You may want to hug a pillow when coughing and sneezing to add additional support to the surgical area and to decrease discomfort if you had abdominal or chest surgery. If you are discharged home with support stockings, you may remove them after 24 hours. Support stockings are used to help prevent blood clots in the legs following surgery. Please take time to review all of your Home Care Instructions and Medication Information sheets provided in your discharge packet. If you have any questions, please contact your surgeons office. Thank you. Patient Education      Hydrocodone/Acetaminophen (Vicodin, Norco, Lortab) - (By mouth)   Why this medicine is used:   Treats pain.   Contact a nurse or doctor right away if you have:  · Blistering, peeling, red skin rash  · Fast or slow heartbeat, shallow breathing, blue lips, fingernails, or skin  · Anxiety, restlessness, muscle spasms, twitching, seeing or hearing things that are not there  · Dark urine or pale stools, yellow skin or eyes  · Extreme weakness, sweating, seizures, cold or clammy skin  · Lightheadedness, dizziness, fainting, fever, sweating     Common side effects:  · Constipation, nausea, vomiting, loss of appetite, stomach pain  · Tiredness or sleepiness  © 2017 [employer] Information is for End User's use only and may not be sold, redistributed or otherwise used for commercial purposes.

## 2020-06-12 NOTE — ANESTHESIA POSTPROCEDURE EVALUATION
Post-Anesthesia Evaluation and Assessment    Patient: Conchita Agee MRN: [id number]  SSN: [id number]    YOB: 1940  Age: [de-identified] y.o. Sex: female      I have evaluated the patient and they are stable and ready for discharge from the PACU. Cardiovascular Function/Vital Signs  Visit Vitals  /58   Pulse 63   Temp 36.5 °C (97.7 °F)   Resp 15   Ht 5' 3\" (1.6 m)   Wt 61.6 kg (135 lb 12.9 oz)   SpO2 98%   BMI 24.06 kg/m²       Patient is status post MAC anesthesia for Procedure(s):  EXCISION SCALP LESION. Nausea/Vomiting: None    Postoperative hydration reviewed and adequate. Pain:  Pain Scale 1: Numeric (0 - 10) (06/12/20 0945)  Pain Intensity 1: 0 (06/12/20 0945)   Managed    Neurological Status:   Neuro (WDL): Within Defined Limits (06/12/20 0923)   At baseline    Mental Status, Level of Consciousness: Alert and  oriented to person, place, and time    Pulmonary Status:   O2 Device: Room air (06/12/20 0945)   Adequate oxygenation and airway patent    Complications related to anesthesia: None    Post-anesthesia assessment completed.  No concerns    Signed By: Rafaela Kaiser MD     June 12, 2020

## 2020-06-12 NOTE — BRIEF OP NOTE
Brief Postoperative Note    Patient: Alberto Batista  YOB: 1940  MRN: [id number]    Date of Procedure: 6/12/2020     Pre-Op Diagnosis: SCALP LESION    Post-Op Diagnosis: Same as preoperative diagnosis.       Procedure(s):  EXCISION SCALP SCC 4 x 5 cm with rotation flap recon 7 x 6cm    Surgeon(s):  Tyler Coronel MD    Surgical Assistant: None    Anesthesia: MAC     Estimated Blood Loss (mL): less than 50     Complications: None    Specimens:   ID Type Source Tests Collected by Time Destination   1 : Left Scalp Squamous Carcinoma, Stitch Inferior Frozen Section Scalp  Tyler Coronel MD 6/12/2020 1764 Pathology        Implants: * No implants in log *    Drains: * No LDAs found *    Findings: see note    Electronically Signed by Brie Charles MD on 6/12/2020 at 9:19 AM Jaswinder Pacheco)

## 2020-07-07 ENCOUNTER — OFFICE VISIT (OUTPATIENT)
Dept: INTERNAL MEDICINE CLINIC | Age: 80
End: 2020-07-07

## 2020-07-07 VITALS
OXYGEN SATURATION: 95 % | RESPIRATION RATE: 16 BRPM | HEART RATE: 79 BPM | HEIGHT: 63 IN | DIASTOLIC BLOOD PRESSURE: 74 MMHG | BODY MASS INDEX: 24.27 KG/M2 | WEIGHT: 137 LBS | TEMPERATURE: 97.6 F | SYSTOLIC BLOOD PRESSURE: 140 MMHG

## 2020-07-07 DIAGNOSIS — Z00.00 MEDICARE ANNUAL WELLNESS VISIT, SUBSEQUENT: Primary | ICD-10-CM

## 2020-07-07 DIAGNOSIS — Z13.6 SCREENING FOR ISCHEMIC HEART DISEASE: ICD-10-CM

## 2020-07-07 DIAGNOSIS — Z13.39 SCREENING FOR ALCOHOLISM: ICD-10-CM

## 2020-07-07 DIAGNOSIS — I10 ESSENTIAL HYPERTENSION: ICD-10-CM

## 2020-07-07 DIAGNOSIS — Z13.31 SCREENING FOR DEPRESSION: ICD-10-CM

## 2020-07-07 DIAGNOSIS — Z13.1 SCREENING FOR DIABETES MELLITUS: ICD-10-CM

## 2020-07-07 RX ORDER — LISINOPRIL 10 MG/1
10 TABLET ORAL DAILY
Qty: 90 TAB | Refills: 3 | Status: SHIPPED | OUTPATIENT
Start: 2020-07-07 | End: 2020-10-07

## 2020-07-07 NOTE — PROGRESS NOTES
This is the Subsequent Medicare Annual Wellness Exam, performed 12 months or more after the Initial AWV or the last Subsequent AWV    I have reviewed the patient's medical history in detail and updated the computerized patient record. History     Recovering from recent scalp skin cancer excision. Had a slight complication of infection but it is getting better. Fairly large portion of the scalp removed, tells me cancer removed entirely. Sounds like may have had skin grafting. 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 F51.02    GERD (gastroesophageal reflux disease) K21.9     Past Medical History:   Diagnosis Date    Arthritis     Carotid stenosis, bilateral 2011    <50%    Chronic sinusitis     Diverticulosis     GERD (gastroesophageal reflux disease)     Hyperlipidemia 5/11/2010    Hypertension     Insomnia due to stress     Irritable bladder     Osteoporosis     Prediabetes     Psychiatric disorder     anxiety    Shingles       Past Surgical History:   Procedure Laterality Date    ABDOMEN SURGERY PROC UNLISTED      HX CATARACT REMOVAL  2014    HX COLECTOMY  04/2014    for a perf    HX COLONOSCOPY  2011    with EGD    HX GYN      hysterectomy    HX GYN      vaginal delivery x 3    HX HIP FRACTURE TX Left 04/19/2018    HX KNEE REPLACEMENT Left 07/28/2016    HX ORTHOPAEDIC Left 2017    Hip Fx Rx     Current Outpatient Medications   Medication Sig Dispense Refill    omeprazole (PRILOSEC) 20 mg capsule Take 1 Cap by mouth daily.  90 Cap 1    oxybutynin (DITROPAN) 5 mg tablet TAKE 1 TABLET EVERY NIGHT 90 Tab 1    traZODone (DESYREL) 50 mg tablet TAKE 1 TABLET EVERY NIGHT 90 Tab 3    busPIRone (BUSPAR) 5 mg tablet TAKE 1 TABLET TWICE DAILY FOR NERVES 180 Tab 3    alendronate (FOSAMAX) 70 mg tablet Take 1 Tab by mouth every seven (7) days. 12 Tab 3    lisinopril (PRINIVIL, ZESTRIL) 10 mg tablet Take 1 Tab by mouth daily. 90 Tab 1    Calcium-Cholecalciferol, D3, 500 mg(1,250mg) -400 unit tab Take  by mouth. Allergies   Allergen Reactions    Nsaids (Non-Steroidal Anti-Inflammatory Drug) Rash    Tolmetin Rash    Naprosyn [Naproxen] Nausea Only       Family History   Problem Relation Age of Onset    Diabetes Mother     Diabetes Sister     Stroke Son      Social History     Tobacco Use    Smoking status: Former Smoker     Packs/day: 1.00     Years: 0.00     Pack years: 0.00     Last attempt to quit: 1990     Years since quittin.1    Smokeless tobacco: Never Used   Substance Use Topics    Alcohol use: No     Alcohol/week: 0.0 standard drinks       Depression Risk Factor Screening:     3 most recent PHQ Screens 2020   Little interest or pleasure in doing things Not at all   Feeling down, depressed, irritable, or hopeless Not at all   Total Score PHQ 2 0       Alcohol Risk Factor Screening:   Do you average 1 drink per night or more than 7 drinks a week:  No    On any one occasion in the past three months have you have had more than 3 drinks containing alcohol:  No      Functional Ability and Level of Safety:   Hearing: The patient wears hearing aids. Activities of Daily Living: The home contains: no safety equipment. Patient does total self care     Ambulation: with mild difficulty     Fall Risk:  Fall Risk Assessment, last 12 mths 2020   Able to walk? Yes   Fall in past 12 months? Yes   Fall with injury?  No   Number of falls in past 12 months 2   Fall Risk Score 2     Abuse Screen:  Patient is not abused       Cognitive Screening   Has your family/caregiver stated any concerns about your memory: no     Cognitive Screening: Abnormal - Clock Drawing Test    Patient Care Team Patient Care Team:  Elodia Waldron MD as PCP - General (Family Practice)  Elodia Waldron MD as PCP - [facility] Empaneled Provider  Surgeon [facility] [address]    Visit Vitals  /74   Pulse 79   Temp 97.6 °F (36.4 °C) (Oral)   Resp 16   Ht 5' 3\" (1.6 m)   Wt 137 lb (62.1 kg)   SpO2 95%   BMI 24.27 kg/m²     WD WN female NAD, elderly, scalp looks like it is healing well  Heart RRR without murmers clicks or rubs  Lungs CTA  Abdo soft nontender  Ext no edema    Assessment/Plan   Education and counseling provided:  Cardiovascular screening blood test  Diabetes screening test    Diagnoses and all orders for this visit:    1. Medicare annual wellness visit, subsequent    2. Screening for alcoholism  -     [place] ANNUAL ALCOHOL SCREEN 15 MIN    3. Screening for depression  -     DEPRESSION SCREEN ANNUAL    4. Screening for diabetes mellitus    5.  Screening for ischemic heart disease        Health Maintenance Due   Topic Date Due    Shingrix Vaccine Age 49> (2 of 2) 01/03/2019    GLAUCOMA SCREENING Q2Y  06/09/2019    Medicare Yearly Exam  03/11/2020

## 2020-07-07 NOTE — PATIENT INSTRUCTIONS
Medicare Wellness Visit, Female The best way to live healthy is to have a lifestyle where you eat a well-balanced diet, exercise regularly, limit alcohol use, and quit all forms of tobacco/nicotine, if applicable. Regular preventive services are another way to keep healthy. Preventive services (vaccines, screening tests, monitoring & exams) can help personalize your care plan, which helps you manage your own care. Screening tests can find health problems at the earliest stages, when they are easiest to treat. [facility] follows the current, evidence-based guidelines published by the [facility] ([facility]) when recommending preventive services for our patients. Because we follow these guidelines, sometimes recommendations change over time as research supports it. (For example, mammograms used to be recommended annually. Even though Medicare will still pay for an annual mammogram, the newer guidelines recommend a mammogram every two years for women of average risk). Of course, you and your doctor may decide to screen more often for some diseases, based on your risk and your co-morbidities (chronic disease you are already diagnosed with). Preventive services for you include: - Medicare offers their members a free annual wellness visit, which is time for you and your primary care provider to discuss and plan for your preventive service needs. Take advantage of this benefit every year! 
-All adults over the age of 72 should receive the recommended pneumonia vaccines. Current USPSTF guidelines recommend a series of two vaccines for the best pneumonia protection.  
-All adults should have a flu vaccine yearly and a tetanus vaccine every 10 years.  
-All adults age 48 and older should receive the shingles vaccines (series of two vaccines). -All adults age 38-68 who are overweight should have a diabetes screening test once every three years. -All adults born between [birth year] and 1965 should be screened once for Hepatitis C. 
-Other screening tests and preventive services for persons with diabetes include: an eye exam to screen for diabetic retinopathy, a kidney function test, a foot exam, and stricter control over your cholesterol.  
-Cardiovascular screening for adults with routine risk involves an electrocardiogram (ECG) at intervals determined by your doctor.  
-Colorectal cancer screenings should be done for adults age 54-65 with no increased risk factors for colorectal cancer. There are a number of acceptable methods of screening for this type of cancer. Each test has its own benefits and drawbacks. Discuss with your doctor what is most appropriate for you during your annual wellness visit. The different tests include: colonoscopy (considered the best screening method), a fecal occult blood test, a fecal DNA test, and sigmoidoscopy. 
 
-A bone mass density test is recommended when a woman turns 65 to screen for osteoporosis. This test is only recommended one time, as a screening. Some providers will use this same test as a disease monitoring tool if you already have osteoporosis. -Breast cancer screenings are recommended every other year for women of normal risk, age 54-69. 
-Cervical cancer screenings for women over age 72 are only recommended with certain risk factors. Here is a list of your current Health Maintenance items (your personalized list of preventive services) with a due date: 
Health Maintenance Due Topic Date Due  Shingles Vaccine (2 of 2) 01/03/2019  Glaucoma Screening   06/09/2019 Marcella [place] Annual Well Visit  03/11/2020 Consider a relaxation sleep CD like: Chakra Relaxation or Sleep like a baby! These are available at [facility] or [address]. New phone apps like Sleep Well can be very helpful. Consider cognitive behavioral therapy if the above do not work. Melatonin 10 mg at night or doxylamine (unisom) Blood sugar is up a little, please lose weight and reduce carbohydrates to reduce the chances that you will get diabetes. Blood sugar is not at a diabetes level. CUT OUT PEPSIs

## 2020-07-07 NOTE — PROGRESS NOTES
Chief Complaint   Patient presents with    Annual Wellness Visit     Fall Risk Assessment, last 12 mths 7/7/2020   Able to walk? Yes   Fall in past 12 months? Yes   Fall with injury? No   Number of falls in past 12 months 2   Fall Risk Score 2       3 most recent PHQ Screens 7/7/2020   Little interest or pleasure in doing things Not at all   Feeling down, depressed, irritable, or hopeless Not at all   Total Score PHQ 2 0       Abuse Screening Questionnaire 7/7/2020   Do you ever feel afraid of your partner? N   Are you in a relationship with someone who physically or mentally threatens you? N   Is it safe for you to go home?  Y       ADL Assessment 7/7/2020   Feeding yourself No Help Needed   Getting from bed to chair No Help Needed   Getting dressed No Help Needed   Bathing or showering No Help Needed   Walk across the room (includes cane/walker) No Help Needed   Using the telphone No Help Needed   Taking your medications No Help Needed   Preparing meals No Help Needed   Managing money (expenses/bills) No Help Needed   Moderately strenuous housework (laundry) No Help Needed   Shopping for personal items (toiletries/medicines) No Help Needed   Shopping for groceries No Help Needed   Driving No Help Needed   Climbing a flight of stairs No Help Needed   Getting to places beyond walking distances No Help Needed

## 2020-07-11 LAB
BUN SERPL-MCNC: 19 MG/DL (ref 8–27)
BUN/CREAT SERPL: 19 (ref 12–28)
CALCIUM SERPL-MCNC: 9.9 MG/DL (ref 8.7–10.3)
CHLORIDE SERPL-SCNC: 102 MMOL/L (ref 96–106)
CHOLEST SERPL-MCNC: 219 MG/DL (ref 100–199)
CO2 SERPL-SCNC: 21 MMOL/L (ref 20–29)
CREAT SERPL-MCNC: 1.02 MG/DL (ref 0.57–1)
GLUCOSE SERPL-MCNC: 137 MG/DL (ref 65–99)
HDLC SERPL-MCNC: 47 MG/DL
INTERPRETATION, 910389: NORMAL
INTERPRETATION: NORMAL
LDLC SERPL CALC-MCNC: 137 MG/DL (ref 0–99)
PDF IMAGE, 910387: NORMAL
POTASSIUM SERPL-SCNC: 4.6 MMOL/L (ref 3.5–5.2)
SODIUM SERPL-SCNC: 140 MMOL/L (ref 134–144)
TRIGL SERPL-MCNC: 175 MG/DL (ref 0–149)
VLDLC SERPL CALC-MCNC: 35 MG/DL (ref 5–40)

## 2020-09-01 DIAGNOSIS — F51.02 INSOMNIA DUE TO PSYCHOLOGICAL STRESS: ICD-10-CM

## 2020-09-01 RX ORDER — TRAZODONE HYDROCHLORIDE 50 MG/1
TABLET ORAL
Qty: 90 TAB | Refills: 0 | Status: SHIPPED | OUTPATIENT
Start: 2020-09-01 | End: 2020-10-07 | Stop reason: SDUPTHER

## 2020-09-16 DIAGNOSIS — K21.9 GASTROESOPHAGEAL REFLUX DISEASE, ESOPHAGITIS PRESENCE NOT SPECIFIED: ICD-10-CM

## 2020-09-17 RX ORDER — OMEPRAZOLE 20 MG/1
CAPSULE, DELAYED RELEASE ORAL
Qty: 90 CAP | Refills: 1 | Status: SHIPPED | OUTPATIENT
Start: 2020-09-17 | End: 2021-03-10 | Stop reason: SDUPTHER

## 2020-10-07 ENCOUNTER — OFFICE VISIT (OUTPATIENT)
Dept: INTERNAL MEDICINE CLINIC | Age: 80
End: 2020-10-07
Payer: MEDICARE

## 2020-10-07 VITALS
OXYGEN SATURATION: 95 % | TEMPERATURE: 98.1 F | HEART RATE: 73 BPM | BODY MASS INDEX: 24.91 KG/M2 | SYSTOLIC BLOOD PRESSURE: 156 MMHG | RESPIRATION RATE: 16 BRPM | HEIGHT: 63 IN | WEIGHT: 140.6 LBS | DIASTOLIC BLOOD PRESSURE: 82 MMHG

## 2020-10-07 DIAGNOSIS — Z13.5 SCREENING FOR GLAUCOMA: ICD-10-CM

## 2020-10-07 DIAGNOSIS — F51.02 INSOMNIA DUE TO PSYCHOLOGICAL STRESS: ICD-10-CM

## 2020-10-07 DIAGNOSIS — N32.89 IRRITABLE BLADDER: ICD-10-CM

## 2020-10-07 DIAGNOSIS — I10 ESSENTIAL HYPERTENSION: ICD-10-CM

## 2020-10-07 DIAGNOSIS — Z23 ENCOUNTER FOR IMMUNIZATION: Primary | ICD-10-CM

## 2020-10-07 DIAGNOSIS — Z23 NEEDS FLU SHOT: ICD-10-CM

## 2020-10-07 PROBLEM — R73.9 ELEVATED BLOOD SUGAR: Status: ACTIVE | Noted: 2020-10-07

## 2020-10-07 PROCEDURE — G8754 DIAS BP LESS 90: HCPCS | Performed by: FAMILY MEDICINE

## 2020-10-07 PROCEDURE — 3288F FALL RISK ASSESSMENT DOCD: CPT | Performed by: FAMILY MEDICINE

## 2020-10-07 PROCEDURE — G8432 DEP SCR NOT DOC, RNG: HCPCS | Performed by: FAMILY MEDICINE

## 2020-10-07 PROCEDURE — G8427 DOCREV CUR MEDS BY ELIG CLIN: HCPCS | Performed by: FAMILY MEDICINE

## 2020-10-07 PROCEDURE — 1090F PRES/ABSN URINE INCON ASSESS: CPT | Performed by: FAMILY MEDICINE

## 2020-10-07 PROCEDURE — G8536 NO DOC ELDER MAL SCRN: HCPCS | Performed by: FAMILY MEDICINE

## 2020-10-07 PROCEDURE — 1100F PTFALLS ASSESS-DOCD GE2>/YR: CPT | Performed by: FAMILY MEDICINE

## 2020-10-07 PROCEDURE — G8420 CALC BMI NORM PARAMETERS: HCPCS | Performed by: FAMILY MEDICINE

## 2020-10-07 PROCEDURE — 90694 VACC AIIV4 NO PRSRV 0.5ML IM: CPT

## 2020-10-07 PROCEDURE — 99214 OFFICE O/P EST MOD 30 MIN: CPT | Performed by: FAMILY MEDICINE

## 2020-10-07 PROCEDURE — G8753 SYS BP > OR = 140: HCPCS | Performed by: FAMILY MEDICINE

## 2020-10-07 PROCEDURE — G0008 ADMIN INFLUENZA VIRUS VAC: HCPCS | Performed by: FAMILY MEDICINE

## 2020-10-07 RX ORDER — TRAZODONE HYDROCHLORIDE 50 MG/1
TABLET ORAL
Qty: 90 TAB | Refills: 3 | Status: SHIPPED | OUTPATIENT
Start: 2020-10-07 | End: 2021-07-31

## 2020-10-07 RX ORDER — LISINOPRIL 20 MG/1
20 TABLET ORAL DAILY
Qty: 90 TAB | Refills: 3 | Status: SHIPPED | OUTPATIENT
Start: 2020-10-07 | End: 2020-11-18 | Stop reason: ALTCHOICE

## 2020-10-07 RX ORDER — OXYBUTYNIN CHLORIDE 5 MG/1
TABLET ORAL
Qty: 90 TAB | Refills: 3 | Status: SHIPPED | OUTPATIENT
Start: 2020-10-07 | End: 2021-07-20 | Stop reason: ALTCHOICE

## 2020-10-07 NOTE — PROGRESS NOTES
Subjective: Wendie Iniguez is a 79 y.o. female who presents for follow up of hypertension. New concerns: feels well      Diet and Lifestyle: nonsmoker    Cardiovascular ROS: taking medications as instructed, no medication side effects noted, no TIA's, no chest pain on exertion, no dyspnea on exertion, no swelling of ankles. Review of Systems, additional:  Pertinent items are noted in HPI. Patient Active Problem List    Diagnosis Date Noted    Hip fracture requiring operative repair, left, closed, with routine healing, subsequent encounter 2019    Irritable bladder 2019    Insomnia due to stress 2019    GERD (gastroesophageal reflux disease) 2019    Diverticulosis 2014    Age related osteoporosis 2012    Essential hypertension 2010    Hyperlipidemia 2010     Current Outpatient Medications   Medication Sig Dispense Refill    omeprazole (PRILOSEC) 20 mg capsule TAKE 1 CAPSULE EVERY DAY 90 Cap 1    traZODone (DESYREL) 50 mg tablet Take 1 tablet by mouth nightly 90 Tab 0    lisinopriL (PRINIVIL, ZESTRIL) 10 mg tablet Take 1 Tab by mouth daily. 90 Tab 3    oxybutynin (DITROPAN) 5 mg tablet TAKE 1 TABLET EVERY NIGHT 90 Tab 1    busPIRone (BUSPAR) 5 mg tablet TAKE 1 TABLET TWICE DAILY FOR NERVES 180 Tab 3    alendronate (FOSAMAX) 70 mg tablet Take 1 Tab by mouth every seven (7) days. 12 Tab 3    Calcium-Cholecalciferol, D3, 500 mg(1,250mg) -400 unit tab Take  by mouth.        Allergies   Allergen Reactions    Nsaids (Non-Steroidal Anti-Inflammatory Drug) Rash    Tolmetin Rash    Naprosyn [Naproxen] Nausea Only     Social History     Tobacco Use    Smoking status: Former Smoker     Packs/day: 1.00     Years: 0.00     Pack years: 0.00     Last attempt to quit: 1990     Years since quittin.4    Smokeless tobacco: Never Used   Substance Use Topics    Alcohol use: No     Alcohol/week: 0.0 standard drinks        Lab Results Component Value Date/Time    Cholesterol, total 219 (H) 07/10/2020 08:32 AM    HDL Cholesterol 47 07/10/2020 08:32 AM    LDL, calculated 137 (H) 07/10/2020 08:32 AM    Triglyceride 175 (H) 07/10/2020 08:32 AM     Lab Results   Component Value Date/Time    GFR est non-AA 52 (L) 07/10/2020 08:32 AM    GFR est AA 60 07/10/2020 08:32 AM    Creatinine 1.02 (H) 07/10/2020 08:32 AM    BUN 19 07/10/2020 08:32 AM    Sodium 140 07/10/2020 08:32 AM    Potassium 4.6 07/10/2020 08:32 AM    Chloride 102 07/10/2020 08:32 AM    CO2 21 07/10/2020 08:32 AM     Lab Results   Component Value Date/Time    Glucose 137 (H) 07/10/2020 08:32 AM    Glucose  04/04/2017 01:54 PM             Objective:     Physical exam significant for the following: WNL    Visit Vitals  BP (!) 156/82   Pulse 73   Temp 98.1 °F (36.7 °C) (Temporal)   Resp 16   Ht 5' 3\" (1.6 m)   Wt 140 lb 9.6 oz (63.8 kg)   SpO2 95%   BMI 24.91 kg/m²     Appearance: alert, well appearing, and in no distress. General exam: CVS exam BP noted to be moderately elevated today in office, S1, S2 normal, no gallop, no murmur, chest clear, no JVD, no HSM, no edema. .   Assessment/Plan:     hypertension stable, asymptomatic, borderline controlled. Increase her lisinopril. ICD-10-CM ICD-9-CM    1. Encounter for immunization  Z23 V03.89 ADMIN INFLUENZA VIRUS VAC      CANCELED: INFLUENZA VACCINE INACTIVATED (IIV), SUBUNIT, ADJUVANTED, IM   2. Insomnia due to psychological stress  F51.02 307.41 traZODone (DESYREL) 50 mg tablet      ADMIN INFLUENZA VIRUS VAC   3. Irritable bladder  N32.89 596.89 oxybutynin (DITROPAN) 5 mg tablet      ADMIN INFLUENZA VIRUS VAC   4. Essential hypertension  O74 920.9 METABOLIC PANEL, BASIC      lisinopriL (PRINIVIL, ZESTRIL) 20 mg tablet      ADMIN INFLUENZA VIRUS VAC   5. Screening for glaucoma  Z13.5 V80.1 REFERRAL TO OPTOMETRY      ADMIN INFLUENZA VIRUS VAC   6.  Needs flu shot  Z23 V04.81 FLU (FLUAD QUAD INFLUENZA VACCINE,QUAD,ADJUVANTED) ADMIN INFLUENZA VIRUS VAC     Orders Placed This Encounter    Influenza Vaccine, QUAD, 65 Yrs +  IM  (Fluad 81331 )    METABOLIC PANEL, BASIC     Standing Status:   Future     Standing Expiration Date:   4/9/2021    REFERRAL TO OPTOMETRY     Referral Priority:   Routine     Referral Type:   Consultation     Referral Reason:   Specialty Services Required     Referred to Provider:   Cindy Beasley OD    ADMIN INFLUENZA VIRUS VAC    traZODone (DESYREL) 50 mg tablet     Sig: Take 1 tablet by mouth nightly     Dispense:  90 Tab     Refill:  3    oxybutynin (DITROPAN) 5 mg tablet     Sig: TAKE 1 TABLET EVERY NIGHT     Dispense:  90 Tab     Refill:  3    lisinopriL (PRINIVIL, ZESTRIL) 20 mg tablet     Sig: Take 1 Tab by mouth daily. Dispense:  90 Tab     Refill:  3     Current Outpatient Medications   Medication Sig Dispense Refill    traZODone (DESYREL) 50 mg tablet Take 1 tablet by mouth nightly 90 Tab 3    oxybutynin (DITROPAN) 5 mg tablet TAKE 1 TABLET EVERY NIGHT 90 Tab 3    lisinopriL (PRINIVIL, ZESTRIL) 20 mg tablet Take 1 Tab by mouth daily. 90 Tab 3    omeprazole (PRILOSEC) 20 mg capsule TAKE 1 CAPSULE EVERY DAY 90 Cap 1    busPIRone (BUSPAR) 5 mg tablet TAKE 1 TABLET TWICE DAILY FOR NERVES 180 Tab 3    alendronate (FOSAMAX) 70 mg tablet Take 1 Tab by mouth every seven (7) days. 12 Tab 3    Calcium-Cholecalciferol, D3, 500 mg(1,250mg) -400 unit tab Take  by mouth. Follow-up and Dispositions    · Return in about 6 weeks (around 11/18/2020) for routine follow up.

## 2020-10-07 NOTE — PATIENT INSTRUCTIONS
Vaccine Information Statement    Influenza (Flu) Vaccine (Inactivated or Recombinant): What You Need to Know    Many Vaccine Information Statements are available in [place] and other languages. See www.immunize.org/vis  Hojas de información sobre vacunas están disponibles en español y en muchos otros idiomas. Visite www.immunize.org/vis    1. Why get vaccinated? Influenza vaccine can prevent influenza (flu). Flu is a contagious disease that spreads around the [facility] every year, usually between October and May. Anyone can get the flu, but it is more dangerous for some people. Infants and young children, people 72years of age and older, pregnant women, and people with certain health conditions or a weakened immune system are at greatest risk of flu complications. Pneumonia, bronchitis, sinus infections and ear infections are examples of flu-related complications. If you have a medical condition, such as heart disease, cancer or diabetes, flu can make it worse. Flu can cause fever and chills, sore throat, muscle aches, fatigue, cough, headache, and runny or stuffy nose. Some people may have vomiting and diarrhea, though this is more common in children than adults. Each year thousands of people in the [facility] die from flu, and many more are hospitalized. Flu vaccine prevents millions of illnesses and flu-related visits to the doctor each year. 2. Influenza vaccines     CDC recommends everyone 10months of age and older get vaccinated every flu season. Children 6 months through 6years of age may need 2 doses during a single flu season. Everyone else needs only 1 dose each flu season. It takes about 2 weeks for protection to develop after vaccination. There are many flu viruses, and they are always changing. Each year a new flu vaccine is made to protect against three or four viruses that are likely to cause disease in the upcoming flu season.  Even when the vaccine doesnt exactly match these viruses, it may still provide some protection. Influenza vaccine does not cause flu. Influenza vaccine may be given at the same time as other vaccines. 3. Talk with your health care provider    Tell your vaccine provider if the person getting the vaccine:   Has had an allergic reaction after a previous dose of influenza vaccine, or has any severe, life-threatening allergies.  Has ever had Guillain-Barré Syndrome (also called GBS). In some cases, your health care provider may decide to postpone influenza vaccination to a future visit. People with minor illnesses, such as a cold, may be vaccinated. People who are moderately or severely ill should usually wait until they recover before getting influenza vaccine. Your health care provider can give you more information. 4. Risks of a reaction     Soreness, redness, and swelling where shot is given, fever, muscle aches, and headache can happen after influenza vaccine.  There may be a very small increased risk of Guillain-Barré Syndrome (GBS) after inactivated influenza vaccine (the flu shot). Charlie Polite children who get the flu shot along with pneumococcal vaccine (PCV13), and/or DTaP vaccine at the same time might be slightly more likely to have a seizure caused by fever. Tell your health care provider if a child who is getting flu vaccine has ever had a seizure. People sometimes faint after medical procedures, including vaccination. Tell your provider if you feel dizzy or have vision changes or ringing in the ears. As with any medicine, there is a very remote chance of a vaccine causing a severe allergic reaction, other serious injury, or death. 5. What if there is a serious problem? An allergic reaction could occur after the vaccinated person leaves the clinic.  If you see signs of a severe allergic reaction (hives, swelling of the face and throat, difficulty breathing, a fast heartbeat, dizziness, or weakness), call 9-1-1 and get the person to the nearest hospital.    For other signs that concern you, call your health care provider. Adverse reactions should be reported to the Vaccine Adverse Event Reporting System (VAERS). Your health care provider will usually file this report, or you can do it yourself. Visit the VAERS website at www.vaers. [facility].gov or call 3-148.686.6162. VAERS is only for reporting reactions, and VAERS staff do not give medical advice. 6. The National Vaccine Injury Compensation Program    The [facility] Vaccine Injury Compensation Program (VICP) is a federal program that was created to compensate people who may have been injured by certain vaccines. Visit the VICP website at www.[facility].gov/vaccinecompensation or call 4-629.164.3385 to learn about the program and about filing a claim. There is a time limit to file a claim for compensation. 7. How can I learn more?  Ask your health care provider.  Call your local or state health department.  Contact the Centers for Disease Control and Prevention (CDC):  - Call 0-207.806.8068 (1-800-CDC-INFO) or  - Visit CDCs influenza website at www.cdc.gov/flu    Vaccine Information Statement (Interim)  Inactivated Influenza Vaccine   8/15/2019  [id number]   Department of Health and Human Services  Centers for Disease Control and Prevention    Office Use Only

## 2020-11-05 LAB
BUN SERPL-MCNC: 26 MG/DL (ref 8–27)
BUN/CREAT SERPL: 18 (ref 12–28)
CALCIUM SERPL-MCNC: 10.5 MG/DL (ref 8.7–10.3)
CHLORIDE SERPL-SCNC: 106 MMOL/L (ref 96–106)
CO2 SERPL-SCNC: 19 MMOL/L (ref 20–29)
CREAT SERPL-MCNC: 1.43 MG/DL (ref 0.57–1)
GLUCOSE SERPL-MCNC: 119 MG/DL (ref 65–99)
INTERPRETATION: NORMAL
POTASSIUM SERPL-SCNC: 4.5 MMOL/L (ref 3.5–5.2)
SODIUM SERPL-SCNC: 141 MMOL/L (ref 134–144)

## 2020-11-06 ENCOUNTER — TELEPHONE (OUTPATIENT)
Dept: INTERNAL MEDICINE CLINIC | Age: 80
End: 2020-11-06

## 2020-11-06 RX ORDER — AMLODIPINE BESYLATE 5 MG/1
5 TABLET ORAL DAILY
Qty: 30 TAB | Refills: 5 | Status: SHIPPED | OUTPATIENT
Start: 2020-11-06 | End: 2020-11-18 | Stop reason: SDUPTHER

## 2020-11-06 NOTE — LETTER
11/6/2020 3:35 PM 
 
Ms. Wendie Iniguez [address] Dear Ms. Isabelle Ricketts: Your recent lab showed the following abnomality increased creatinine possibly some kidney damage. As we discussed stop lisinopril start new medication amlodipine We need you to return to re-check the laboratory value again in a week or so. 
  
 
 
 
Sincerely, Hubert Lopez MD

## 2020-11-06 NOTE — TELEPHONE ENCOUNTER
Stop lisinopril  Start amlodipine. Discussed possible side affects, precautions, and drug interactions and possible benefits of the medication(s). Send results letter as below:  Your recent lab showed the following abnomality increased creatinine possibly some kidney damage. As we discussed stop lisinopril start new medication amlodipine  We need you to return to re-check the laboratory value again in a week or so.

## 2020-11-11 ENCOUNTER — TELEPHONE (OUTPATIENT)
Dept: INTERNAL MEDICINE CLINIC | Age: 80
End: 2020-11-11

## 2020-11-11 DIAGNOSIS — I10 ESSENTIAL HYPERTENSION: Primary | ICD-10-CM

## 2020-11-11 NOTE — TELEPHONE ENCOUNTER
Patient has an appointment next week for a follow up and Dr Ivey Apo wanted labs done prior to her visit - orders will need to be put into chart to be faxed to [address] [place] [address] today if possible  Roger Love LPN  [phone]  0:31 AM

## 2020-11-18 ENCOUNTER — OFFICE VISIT (OUTPATIENT)
Dept: INTERNAL MEDICINE CLINIC | Age: 80
End: 2020-11-18
Payer: MEDICARE

## 2020-11-18 VITALS
BODY MASS INDEX: 24.8 KG/M2 | SYSTOLIC BLOOD PRESSURE: 120 MMHG | WEIGHT: 140 LBS | HEIGHT: 63 IN | DIASTOLIC BLOOD PRESSURE: 70 MMHG | HEART RATE: 88 BPM | RESPIRATION RATE: 18 BRPM | OXYGEN SATURATION: 95 % | TEMPERATURE: 97.6 F

## 2020-11-18 DIAGNOSIS — N18.2 CRI (CHRONIC RENAL INSUFFICIENCY), STAGE 2 (MILD): ICD-10-CM

## 2020-11-18 DIAGNOSIS — K21.9 GASTROESOPHAGEAL REFLUX DISEASE WITHOUT ESOPHAGITIS: ICD-10-CM

## 2020-11-18 DIAGNOSIS — I10 ESSENTIAL HYPERTENSION: Primary | ICD-10-CM

## 2020-11-18 DIAGNOSIS — N32.89 IRRITABLE BLADDER: ICD-10-CM

## 2020-11-18 DIAGNOSIS — M80.00XD AGE-RELATED OSTEOPOROSIS WITH CURRENT PATHOLOGICAL FRACTURE WITH ROUTINE HEALING, SUBSEQUENT ENCOUNTER: ICD-10-CM

## 2020-11-18 DIAGNOSIS — S72.002D HIP FRACTURE REQUIRING OPERATIVE REPAIR, LEFT, CLOSED, WITH ROUTINE HEALING, SUBSEQUENT ENCOUNTER: ICD-10-CM

## 2020-11-18 PROCEDURE — 1101F PT FALLS ASSESS-DOCD LE1/YR: CPT | Performed by: FAMILY MEDICINE

## 2020-11-18 PROCEDURE — G8752 SYS BP LESS 140: HCPCS | Performed by: FAMILY MEDICINE

## 2020-11-18 PROCEDURE — G8510 SCR DEP NEG, NO PLAN REQD: HCPCS | Performed by: FAMILY MEDICINE

## 2020-11-18 PROCEDURE — G8427 DOCREV CUR MEDS BY ELIG CLIN: HCPCS | Performed by: FAMILY MEDICINE

## 2020-11-18 PROCEDURE — G8536 NO DOC ELDER MAL SCRN: HCPCS | Performed by: FAMILY MEDICINE

## 2020-11-18 PROCEDURE — 99214 OFFICE O/P EST MOD 30 MIN: CPT | Performed by: FAMILY MEDICINE

## 2020-11-18 PROCEDURE — G8420 CALC BMI NORM PARAMETERS: HCPCS | Performed by: FAMILY MEDICINE

## 2020-11-18 PROCEDURE — 1090F PRES/ABSN URINE INCON ASSESS: CPT | Performed by: FAMILY MEDICINE

## 2020-11-18 PROCEDURE — G8754 DIAS BP LESS 90: HCPCS | Performed by: FAMILY MEDICINE

## 2020-11-18 RX ORDER — AMLODIPINE BESYLATE 5 MG/1
5 TABLET ORAL DAILY
Qty: 90 TAB | Refills: 3 | Status: SHIPPED | OUTPATIENT
Start: 2020-11-18 | End: 2021-03-10 | Stop reason: SDUPTHER

## 2020-11-18 RX ORDER — ALENDRONATE SODIUM 70 MG/1
70 TABLET ORAL
Qty: 12 TAB | Refills: 3 | Status: SHIPPED | OUTPATIENT
Start: 2020-11-18 | End: 2020-12-07

## 2020-11-18 NOTE — PROGRESS NOTES
Subjective: Juliana Borges is a 79 y.o. female who presents for follow up of hypertension. New concerns: new BP med amlodipine  Minimal heartburn as long as she takes her medicine, no diarrhea or cough. No recent fall or injury    Diet and Lifestyle: nonsmoker    Cardiovascular ROS: taking medications as instructed, no medication side effects noted, no TIA's, no chest pain on exertion, no dyspnea on exertion, no swelling of ankles. Review of Systems, additional:  Pertinent items are noted in HPI. Patient Active Problem List    Diagnosis Date Noted    Elevated blood sugar 10/07/2020    Hip fracture requiring operative repair, left, closed, with routine healing, subsequent encounter 2019    Irritable bladder 2019    Insomnia due to stress 2019    GERD (gastroesophageal reflux disease) 2019    Diverticulosis 2014    Age related osteoporosis 2012    Essential hypertension 2010    Hyperlipidemia 2010     Current Outpatient Medications   Medication Sig Dispense Refill    amLODIPine (NORVASC) 5 mg tablet Take 1 Tab by mouth daily. 90 Tab 3    alendronate (FOSAMAX) 70 mg tablet Take 1 Tab by mouth every seven (7) days. 12 Tab 3    traZODone (DESYREL) 50 mg tablet Take 1 tablet by mouth nightly 90 Tab 3    oxybutynin (DITROPAN) 5 mg tablet TAKE 1 TABLET EVERY NIGHT 90 Tab 3    omeprazole (PRILOSEC) 20 mg capsule TAKE 1 CAPSULE EVERY DAY 90 Cap 1    busPIRone (BUSPAR) 5 mg tablet TAKE 1 TABLET TWICE DAILY FOR NERVES 180 Tab 3    Calcium-Cholecalciferol, D3, 500 mg(1,250mg) -400 unit tab Take  by mouth.        Allergies   Allergen Reactions    Nsaids (Non-Steroidal Anti-Inflammatory Drug) Rash    Tolmetin Rash    Naprosyn [Naproxen] Nausea Only     Social History     Tobacco Use    Smoking status: Former Smoker     Packs/day: 1.00     Years: 0.00     Pack years: 0.00     Last attempt to quit: 1990     Years since quittin.5    Smokeless tobacco: Never Used   Substance Use Topics    Alcohol use: No     Alcohol/week: 0.0 standard drinks        Lab Results   Component Value Date/Time    GFR est non-AA 34 (L) 11/04/2020 01:07 PM    GFR est AA 40 (L) 11/04/2020 01:07 PM    Creatinine 1.43 (H) 11/04/2020 01:07 PM    BUN 26 11/04/2020 01:07 PM    Sodium 141 11/04/2020 01:07 PM    Potassium 4.5 11/04/2020 01:07 PM    Chloride 106 11/04/2020 01:07 PM    CO2 19 (L) 11/04/2020 01:07 PM     Lab Results   Component Value Date/Time    Glucose 119 (H) 11/04/2020 01:07 PM    Glucose  04/04/2017 01:54 PM             Objective:     Physical exam significant for the following:     Within normal limits    Visit Vitals  /70   Pulse 88   Temp 97.6 °F (36.4 °C) (Temporal)   Resp 18   Ht 5' 3\" (1.6 m)   Wt 140 lb (63.5 kg)   SpO2 95%   BMI 24.80 kg/m²     Appearance: alert, well appearing, and in no distress. General exam: CVS exam BP noted to be well controlled today in office, S1, S2 normal, no gallop, no murmur, chest clear, no JVD, no HSM, no edema. .   Assessment/Plan:     hypertension well controlled, stable. ICD-10-CM ICD-9-CM    1. Essential hypertension  U61 372.7 METABOLIC PANEL, BASIC   2. Age-related osteoporosis with current pathological fracture with routine healing, subsequent encounter  M80.00XD [id number] alendronate (FOSAMAX) 70 mg tablet   3. Gastroesophageal reflux disease without esophagitis  K21.9 530.81    4. Hip fracture requiring operative repair, left, closed, with routine healing, subsequent encounter  S72.002D V54.13    5. Irritable bladder  N32.89 596.89    6. CRI (chronic renal insufficiency), stage 2 (mild)  N18.2 585.2      Orders Placed This Encounter    METABOLIC PANEL, BASIC     Standing Status:   Future     Standing Expiration Date:   5/21/2021    amLODIPine (NORVASC) 5 mg tablet     Sig: Take 1 Tab by mouth daily.      Dispense:  90 Tab     Refill:  3    alendronate (FOSAMAX) 70 mg tablet     Sig: Take 1 Tab by mouth every seven (7) days. Dispense:  12 Tab     Refill:  3       Follow-up and Dispositions    · Return in about 4 months (around 3/18/2021) for routine follow up.

## 2020-11-18 NOTE — PROGRESS NOTES
Chief Complaint   Patient presents with    Hypertension     follow up     Health Maintenance reviewed. I have reviewed the patient's medical history in detail and updated the computerized patient record. Patient has not been out of the country in (6-7 months), NO diarrhea, NO cough, NO chest conjestion, NO temp. Pt has not been around anyone with these symptoms. 1. Have you been to the ER, urgent care clinic since your last visit? Hospitalized since your last visit?no    2. Have you seen or consulted any other health care providers outside of the [facility] since your last visit? Include any pap smears or colon screening. No      Encouraged pt to discuss pt's wishes with spouse/partner/family and bring them in the next appt to follow thru with the Advanced Directive    Fall Risk Assessment, last 12 mths 11/18/2020   Able to walk? Yes   Fall in past 12 months? No   Fall with injury? -   Number of falls in past 12 months -   Fall Risk Score -       3 most recent PHQ Screens 11/18/2020   Little interest or pleasure in doing things Several days   Feeling down, depressed, irritable, or hopeless Several days   Total Score PHQ 2 2       Abuse Screening Questionnaire 11/18/2020   Do you ever feel afraid of your partner? N   Are you in a relationship with someone who physically or mentally threatens you? N   Is it safe for you to go home?  Y       ADL Assessment 11/18/2020   Feeding yourself No Help Needed   Getting from bed to chair No Help Needed   Getting dressed No Help Needed   Bathing or showering No Help Needed   Walk across the room (includes cane/walker) No Help Needed   Using the telphone No Help Needed   Taking your medications No Help Needed   Preparing meals No Help Needed   Managing money (expenses/bills) No Help Needed   Moderately strenuous housework (laundry) No Help Needed   Shopping for personal items (toiletries/medicines) No Help Needed   Shopping for groceries No Help Needed Driving No Help Needed   Climbing a flight of stairs No Help Needed   Getting to places beyond walking distances No Help Needed

## 2020-11-24 LAB
BUN SERPL-MCNC: 22 MG/DL (ref 8–27)
BUN/CREAT SERPL: 19 (ref 12–28)
CALCIUM SERPL-MCNC: 9.8 MG/DL (ref 8.7–10.3)
CHLORIDE SERPL-SCNC: 105 MMOL/L (ref 96–106)
CO2 SERPL-SCNC: 23 MMOL/L (ref 20–29)
CREAT SERPL-MCNC: 1.16 MG/DL (ref 0.57–1)
GLUCOSE SERPL-MCNC: 111 MG/DL (ref 65–99)
INTERPRETATION: NORMAL
POTASSIUM SERPL-SCNC: 4.6 MMOL/L (ref 3.5–5.2)
SODIUM SERPL-SCNC: 141 MMOL/L (ref 134–144)

## 2020-12-07 DIAGNOSIS — M80.00XD AGE-RELATED OSTEOPOROSIS WITH CURRENT PATHOLOGICAL FRACTURE WITH ROUTINE HEALING, SUBSEQUENT ENCOUNTER: ICD-10-CM

## 2020-12-07 RX ORDER — ALENDRONATE SODIUM 70 MG/1
TABLET ORAL
Qty: 12 TAB | Refills: 3 | Status: SHIPPED | OUTPATIENT
Start: 2020-12-07

## 2021-03-10 ENCOUNTER — OFFICE VISIT (OUTPATIENT)
Dept: INTERNAL MEDICINE CLINIC | Age: 81
End: 2021-03-10
Payer: MEDICARE

## 2021-03-10 VITALS
HEIGHT: 63 IN | RESPIRATION RATE: 18 BRPM | HEART RATE: 76 BPM | TEMPERATURE: 97.4 F | WEIGHT: 135 LBS | BODY MASS INDEX: 23.92 KG/M2 | OXYGEN SATURATION: 96 % | DIASTOLIC BLOOD PRESSURE: 79 MMHG | SYSTOLIC BLOOD PRESSURE: 139 MMHG

## 2021-03-10 DIAGNOSIS — Z23 ENCOUNTER FOR IMMUNIZATION: ICD-10-CM

## 2021-03-10 DIAGNOSIS — H61.21 IMPACTED CERUMEN, RIGHT EAR: ICD-10-CM

## 2021-03-10 DIAGNOSIS — I10 ESSENTIAL HYPERTENSION: Primary | ICD-10-CM

## 2021-03-10 DIAGNOSIS — K21.9 GERD WITHOUT ESOPHAGITIS: ICD-10-CM

## 2021-03-10 PROCEDURE — 1090F PRES/ABSN URINE INCON ASSESS: CPT | Performed by: FAMILY MEDICINE

## 2021-03-10 PROCEDURE — G8754 DIAS BP LESS 90: HCPCS | Performed by: FAMILY MEDICINE

## 2021-03-10 PROCEDURE — G8427 DOCREV CUR MEDS BY ELIG CLIN: HCPCS | Performed by: FAMILY MEDICINE

## 2021-03-10 PROCEDURE — G8536 NO DOC ELDER MAL SCRN: HCPCS | Performed by: FAMILY MEDICINE

## 2021-03-10 PROCEDURE — 69210 REMOVE IMPACTED EAR WAX UNI: CPT | Performed by: FAMILY MEDICINE

## 2021-03-10 PROCEDURE — 1101F PT FALLS ASSESS-DOCD LE1/YR: CPT | Performed by: FAMILY MEDICINE

## 2021-03-10 PROCEDURE — G8752 SYS BP LESS 140: HCPCS | Performed by: FAMILY MEDICINE

## 2021-03-10 PROCEDURE — 99214 OFFICE O/P EST MOD 30 MIN: CPT | Performed by: FAMILY MEDICINE

## 2021-03-10 PROCEDURE — G8432 DEP SCR NOT DOC, RNG: HCPCS | Performed by: FAMILY MEDICINE

## 2021-03-10 PROCEDURE — G8420 CALC BMI NORM PARAMETERS: HCPCS | Performed by: FAMILY MEDICINE

## 2021-03-10 RX ORDER — AMLODIPINE BESYLATE 5 MG/1
5 TABLET ORAL DAILY
Qty: 90 TAB | Refills: 3 | Status: SHIPPED | OUTPATIENT
Start: 2021-03-10 | End: 2021-03-31 | Stop reason: SDUPTHER

## 2021-03-10 RX ORDER — OMEPRAZOLE 20 MG/1
CAPSULE, DELAYED RELEASE ORAL
Qty: 90 CAP | Refills: 3 | Status: SHIPPED | OUTPATIENT
Start: 2021-03-10 | End: 2021-12-02

## 2021-03-10 NOTE — PROGRESS NOTES
Chief Complaint   Patient presents with    Hypertension          Health Maintenance reviewed. I have reviewed the patient's medical history in detail and updated the computerized patient record. Patient has not been out of the country in (12 months), NO diarrhea, NO cough, NO chest conjestion, NO temp. Pt has not been around anyone with these symptoms. Encouraged pt to discuss pt's wishes with spouse/partner/family and bring them in the next appt to follow thru with the Advanced Directive    Fall Risk Assessment, last 12 mths 3/10/2021   Able to walk? Yes   Fall in past 12 months? 0   Do you feel unsteady? 0   Are you worried about falling 0   Number of falls in past 12 months -   Fall with injury? -       3 most recent PHQ Screens 3/10/2021   Little interest or pleasure in doing things Nearly every day   Feeling down, depressed, irritable, or hopeless Nearly every day   Total Score PHQ 2 6       Abuse Screening Questionnaire 3/10/2021   Do you ever feel afraid of your partner? N   Are you in a relationship with someone who physically or mentally threatens you? N   Is it safe for you to go home?  Y       ADL Assessment 3/10/2021   Feeding yourself No Help Needed   Getting from bed to chair No Help Needed   Getting dressed No Help Needed   Bathing or showering No Help Needed   Walk across the room (includes cane/walker) No Help Needed   Using the telphone No Help Needed   Taking your medications No Help Needed   Preparing meals No Help Needed   Managing money (expenses/bills) No Help Needed   Moderately strenuous housework (laundry) No Help Needed   Shopping for personal items (toiletries/medicines) No Help Needed   Shopping for groceries No Help Needed   Driving No Help Needed   Climbing a flight of stairs No Help Needed   Getting to places beyond walking distances No Help Needed

## 2021-03-10 NOTE — PROGRESS NOTES
Subjective: Cathy Alcantara is a 79 y.o. female who presents for follow up of hypertension. New concerns: popping in her ears, davian right  Otherwise no c/o    Diet and Lifestyle: nonsmoker    Cardiovascular ROS:   Reports taking blood pressure medications without side affects. No complaints of exertional chest pain, excessive shortness of breath or focal weakness. Minimal swelling in lower legs or dizziness with standing. Review of Systems, additional:  Pertinent items are noted in HPI. Patient Active Problem List    Diagnosis Date Noted    Elevated blood sugar 10/07/2020    Hip fracture requiring operative repair, left, closed, with routine healing, subsequent encounter 2019    Irritable bladder 2019    Insomnia due to stress 2019    GERD (gastroesophageal reflux disease) 2019    Diverticulosis 2014    Age related osteoporosis 2012    Essential hypertension 2010    Hyperlipidemia 2010     Current Outpatient Medications   Medication Sig Dispense Refill    amLODIPine (NORVASC) 5 mg tablet Take 1 Tab by mouth daily. 90 Tab 3    omeprazole (PRILOSEC) 20 mg capsule TAKE 1 CAPSULE EVERY DAY 90 Cap 3    alendronate (FOSAMAX) 70 mg tablet TAKE 1 TABLET EVERY 7 DAYS 12 Tab 3    traZODone (DESYREL) 50 mg tablet Take 1 tablet by mouth nightly 90 Tab 3    oxybutynin (DITROPAN) 5 mg tablet TAKE 1 TABLET EVERY NIGHT 90 Tab 3    busPIRone (BUSPAR) 5 mg tablet TAKE 1 TABLET TWICE DAILY FOR NERVES 180 Tab 3    Calcium-Cholecalciferol, D3, 500 mg(1,250mg) -400 unit tab Take  by mouth.        Allergies   Allergen Reactions    Nsaids (Non-Steroidal Anti-Inflammatory Drug) Rash    Tolmetin Rash    Naprosyn [Naproxen] Nausea Only     Social History     Tobacco Use    Smoking status: Former Smoker     Packs/day: 1.00     Years: 0.00     Pack years: 0.00     Quit date: 1990     Years since quittin.8    Smokeless tobacco: Never Used Substance Use Topics    Alcohol use: No     Alcohol/week: 0.0 standard drinks        Lab Results   Component Value Date/Time    WBC 9.7 06/01/2020 03:17 PM    HGB 11.2 06/01/2020 03:17 PM    HCT 33.9 (L) 06/01/2020 03:17 PM    PLATELET 680 39/93/4203 03:17 PM    MCV 87 06/01/2020 03:17 PM     Lab Results   Component Value Date/Time    ALT (SGPT) 7 06/01/2020 03:17 PM    Alk. phosphatase 86 06/01/2020 03:17 PM    Bilirubin, total 0.2 06/01/2020 03:17 PM    Albumin 4.1 06/01/2020 03:17 PM    Protein, total 6.7 06/01/2020 03:17 PM    INR 1.0 05/01/2014 09:34 AM    Prothrombin time 10.7 05/01/2014 09:34 AM    PLATELET 597 75/15/9013 03:17 PM     Lab Results   Component Value Date/Time    GFR est non-AA 44 (L) 11/23/2020 11:10 AM    GFR est AA 51 (L) 11/23/2020 11:10 AM    Creatinine 1.16 (H) 11/23/2020 11:10 AM    BUN 22 11/23/2020 11:10 AM    Sodium 141 11/23/2020 11:10 AM    Potassium 4.6 11/23/2020 11:10 AM    Chloride 105 11/23/2020 11:10 AM    CO2 23 11/23/2020 11:10 AM     Lab Results   Component Value Date/Time    Glucose 111 (H) 11/23/2020 11:10 AM    Glucose  04/04/2017 01:54 PM             Objective:     Physical exam significant for the following:     elderly  Visit Vitals  /79 (BP 1 Location: Left arm, BP Patient Position: Sitting, BP Cuff Size: Adult)   Pulse 76   Temp 97.4 °F (36.3 °C) (Temporal)   Resp 18   Ht 5' 3\" (1.6 m)   Wt 135 lb (61.2 kg)   SpO2 96%   BMI 23.91 kg/m²     WD WN female NAD  r tm impacted wax left partially cl  Heart RRR without murmers clicks or rubs  Lungs CTA  Abdo soft nontender  Ext no edema  Ears washed out now cl  . Assessment/Plan:     hypertension well controlled, stable      ICD-10-CM ICD-9-CM    1. Essential hypertension  I10 401.9    2. GERD without esophagitis  K21.9 530.81 omeprazole (PRILOSEC) 20 mg capsule   3. Impacted cerumen, right ear  H61.21 380.4 REMOVE IMPACTED EAR WAX   4.  Encounter for immunization  Z23 V03.89      Orders Placed This Encounter    REMOVE IMPACTED EAR WAX    amLODIPine (NORVASC) 5 mg tablet     Sig: Take 1 Tab by mouth daily. Dispense:  90 Tab     Refill:  3    omeprazole (PRILOSEC) 20 mg capsule     Sig: TAKE 1 CAPSULE EVERY DAY     Dispense:  90 Cap     Refill:  3     Above issues stable    We discussed the covid vaccine. Discussed availability and how we prioritize patients to get it. Resources discussed. We discussed how important it is to get the vaccine and the relatively low side affects from it. Appt MOn to go get.     Follow-up and Dispositions    · Return in about 4 months (around 7/10/2021) for medicare annual.

## 2021-03-15 ENCOUNTER — IMMUNIZATION (OUTPATIENT)
Dept: FAMILY MEDICINE CLINIC | Age: 81
End: 2021-03-15
Payer: MEDICARE

## 2021-03-15 DIAGNOSIS — Z23 ENCOUNTER FOR IMMUNIZATION: Primary | ICD-10-CM

## 2021-03-15 PROCEDURE — 91300 COVID-19, MRNA, LNP-S, PF, 30MCG/0.3ML DOSE(PFIZER): CPT

## 2021-03-29 DIAGNOSIS — F51.02 INSOMNIA DUE TO PSYCHOLOGICAL STRESS: ICD-10-CM

## 2021-03-30 RX ORDER — BUSPIRONE HYDROCHLORIDE 5 MG/1
TABLET ORAL
Qty: 180 TAB | Refills: 3 | Status: SHIPPED | OUTPATIENT
Start: 2021-03-30 | End: 2021-11-01 | Stop reason: ALTCHOICE

## 2021-03-31 RX ORDER — AMLODIPINE BESYLATE 5 MG/1
5 TABLET ORAL DAILY
Qty: 90 TAB | Refills: 3 | Status: SHIPPED | OUTPATIENT
Start: 2021-03-31 | End: 2021-09-28 | Stop reason: SDUPTHER

## 2021-04-05 ENCOUNTER — IMMUNIZATION (OUTPATIENT)
Dept: FAMILY MEDICINE CLINIC | Age: 81
End: 2021-04-05
Payer: MEDICARE

## 2021-04-05 DIAGNOSIS — Z23 ENCOUNTER FOR IMMUNIZATION: Primary | ICD-10-CM

## 2021-04-05 PROCEDURE — 91300 COVID-19, MRNA, LNP-S, PF, 30MCG/0.3ML DOSE(PFIZER): CPT

## 2021-07-20 ENCOUNTER — OFFICE VISIT (OUTPATIENT)
Dept: INTERNAL MEDICINE CLINIC | Age: 81
End: 2021-07-20
Payer: MEDICARE

## 2021-07-20 VITALS
OXYGEN SATURATION: 94 % | BODY MASS INDEX: 24.8 KG/M2 | HEART RATE: 76 BPM | WEIGHT: 140 LBS | SYSTOLIC BLOOD PRESSURE: 149 MMHG | HEIGHT: 63 IN | TEMPERATURE: 98.2 F | RESPIRATION RATE: 18 BRPM | DIASTOLIC BLOOD PRESSURE: 72 MMHG

## 2021-07-20 DIAGNOSIS — Z23 ENCOUNTER FOR IMMUNIZATION: ICD-10-CM

## 2021-07-20 DIAGNOSIS — N32.89 IRRITABLE BLADDER: ICD-10-CM

## 2021-07-20 DIAGNOSIS — Z13.39 SCREENING FOR ALCOHOLISM: ICD-10-CM

## 2021-07-20 DIAGNOSIS — E78.2 MIXED HYPERLIPIDEMIA: ICD-10-CM

## 2021-07-20 DIAGNOSIS — I10 ESSENTIAL HYPERTENSION: ICD-10-CM

## 2021-07-20 DIAGNOSIS — Z00.00 MEDICARE ANNUAL WELLNESS VISIT, SUBSEQUENT: Primary | ICD-10-CM

## 2021-07-20 DIAGNOSIS — H60.311 ACUTE DIFFUSE OTITIS EXTERNA OF RIGHT EAR: ICD-10-CM

## 2021-07-20 PROCEDURE — G8427 DOCREV CUR MEDS BY ELIG CLIN: HCPCS | Performed by: FAMILY MEDICINE

## 2021-07-20 PROCEDURE — G8432 DEP SCR NOT DOC, RNG: HCPCS | Performed by: FAMILY MEDICINE

## 2021-07-20 PROCEDURE — 99214 OFFICE O/P EST MOD 30 MIN: CPT | Performed by: FAMILY MEDICINE

## 2021-07-20 PROCEDURE — 1090F PRES/ABSN URINE INCON ASSESS: CPT | Performed by: FAMILY MEDICINE

## 2021-07-20 PROCEDURE — G0439 PPPS, SUBSEQ VISIT: HCPCS | Performed by: FAMILY MEDICINE

## 2021-07-20 PROCEDURE — 1101F PT FALLS ASSESS-DOCD LE1/YR: CPT | Performed by: FAMILY MEDICINE

## 2021-07-20 PROCEDURE — G8420 CALC BMI NORM PARAMETERS: HCPCS | Performed by: FAMILY MEDICINE

## 2021-07-20 PROCEDURE — G8536 NO DOC ELDER MAL SCRN: HCPCS | Performed by: FAMILY MEDICINE

## 2021-07-20 PROCEDURE — G8754 DIAS BP LESS 90: HCPCS | Performed by: FAMILY MEDICINE

## 2021-07-20 PROCEDURE — G8753 SYS BP > OR = 140: HCPCS | Performed by: FAMILY MEDICINE

## 2021-07-20 RX ORDER — ACETIC ACID 20.65 MG/ML
4 SOLUTION AURICULAR (OTIC) 3 TIMES DAILY
Qty: 15 ML | Refills: 0 | Status: SHIPPED | OUTPATIENT
Start: 2021-07-20 | End: 2021-07-27

## 2021-07-20 NOTE — PROGRESS NOTES
This is the Subsequent Medicare Annual Wellness Exam, performed 12 months or more after the Initial AWV or the last Subsequent AWV    I have reviewed the patient's medical history in detail and updated the computerized patient record. Feels OK  C/o ears itch    Visit Vitals  BP (!) 149/72   Pulse 76   Temp 98.2 °F (36.8 °C) (Oral)   Resp 18   Ht 5' 3\" (1.6 m)   Wt 140 lb (63.5 kg)   SpO2 94%   BMI 24.80 kg/m²       WDWN NAD  TM clear, throat wnl  Neck no adenopathy  Heart RRR no C/M/R  Lungs CTA  Abdo soft non tender  Ext No redness swelling or edema      Assessment/Plan   Education and counseling provided:  Cardiovascular screening blood test  Diabetes screening test  Change bladder med ditropan on beers list  Discussed possible side affects, precautions, and drug interactions and possible benefits of the medication(s). C/o ear itching OE RX        ICD-10-CM ICD-9-CM    1. Medicare annual wellness visit, subsequent  Z00.00 V70.0    2. Irritable bladder  N32.89 596.89 mirabegron ER (Myrbetriq) 25 mg ER tablet   3. Encounter for immunization  Z23 V03.89 varicella-zoster recombinant, PF, (SHINGRIX) 50 mcg/0.5 mL susr injection   4. Mixed hyperlipidemia  E78.2 272.2    5. Essential hypertension  W56 394.8 METABOLIC PANEL, BASIC   6. Screening for alcoholism  Z13.39 V79.1 CA ANNUAL ALCOHOL SCREEN 15 MIN     Orders Placed This Encounter    METABOLIC PANEL, BASIC     Standing Status:   Future     Standing Expiration Date:       METABOLIC PANEL, BASIC    CKD REPORT    ANNUAL ALCOHOL SCREEN 8-15 MIN    acetic acid (VOSOL) 2 % otic solution     Sig: Administer 4 Drops into each ear three (3) times daily for 7 days. Dispense:  15 mL     Refill:  0    mirabegron ER (Myrbetriq) 25 mg ER tablet     Sig: Take 1 Tablet by mouth nightly.      Dispense:  30 Tablet     Refill:  3    varicella-zoster recombinant, PF, (SHINGRIX) 50 mcg/0.5 mL susr injection     Si.5 mL by IntraMUSCular route once for 1 dose.     Dispense:  0.5 mL     Refill:  0     Discussed possible side affects, precautions, and drug interactions and possible benefits of the medication(s). Depression Risk Factor Screening     3 most recent PHQ Screens 7/20/2021   Little interest or pleasure in doing things Nearly every day   Feeling down, depressed, irritable, or hopeless Nearly every day   Total Score PHQ 2 6       Alcohol Risk Screen    Do you average more than 1 drink per night or more than 7 drinks a week:  No    On any one occasion in the past three months have you have had more than 3 drinks containing alcohol:  No        Functional Ability and Level of Safety    Hearing: needs further eval sees audiology      Activities of Daily Living: The home contains: grab bars  Patient does total self care      Ambulation: with no difficulty     Fall Risk:  Fall Risk Assessment, last 12 mths 7/20/2021   Able to walk? Yes   Fall in past 12 months? 0   Do you feel unsteady? 0   Are you worried about falling 0   Number of falls in past 12 months -   Fall with injury?  -      Abuse Screen:  Patient is not abused       Cognitive Screening    Has your family/caregiver stated any concerns about your memory: no     Cognitive Screening: Normal - Clock Drawing Test    Health Maintenance Due     Health Maintenance Due   Topic Date Due    Shingrix Vaccine Age 49> (2 of 2) 01/03/2019       Patient Care Team   Patient Care Team:  Celi Hernández MD as PCP - General (Family Medicine)  Celi Hernández MD as PCP - [facility] Empaneled Provider    History     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 F51.02    GERD (gastroesophageal reflux disease) K21.9    Elevated blood sugar R73.9 Past Medical History:   Diagnosis Date    Arthritis     Cancer ([facility] [address]     scalp skin CA    Carotid stenosis, bilateral 2011    <50%    Chronic sinusitis     Diverticulosis     GERD (gastroesophageal reflux disease)     Hyperlipidemia 5/11/2010    Hypertension     Insomnia due to stress     Irritable bladder     Osteoporosis     Prediabetes     Psychiatric disorder     anxiety    Shingles       Past Surgical History:   Procedure Laterality Date    HX CATARACT REMOVAL  2014    HX COLONOSCOPY  2011    with EGD    HX GYN      hysterectomy    HX GYN      vaginal delivery x 3    HX HIP FRACTURE TX Left 04/19/2018    HX KNEE REPLACEMENT Left 07/28/2016    HX ORTHOPAEDIC Left 2017    Hip Fx Rx    HX TOTAL COLECTOMY  04/2014    for a perf    [place] ABDOMEN SURGERY PROC UNLISTED       Current Outpatient Medications   Medication Sig Dispense Refill    acetic acid (VOSOL) 2 % otic solution Administer 4 Drops into each ear three (3) times daily for 7 days. 15 mL 0    mirabegron ER (Myrbetriq) 25 mg ER tablet Take 1 Tablet by mouth nightly. 30 Tablet 3    varicella-zoster recombinant, PF, (SHINGRIX) 50 mcg/0.5 mL susr injection 0.5 mL by IntraMUSCular route once for 1 dose. 0.5 mL 0    amLODIPine (NORVASC) 5 mg tablet Take 1 Tab by mouth daily. 90 Tab 3    busPIRone (BUSPAR) 5 mg tablet TAKE 1 TABLET TWICE DAILY FOR NERVES 180 Tab 3    omeprazole (PRILOSEC) 20 mg capsule TAKE 1 CAPSULE EVERY DAY 90 Cap 3    alendronate (FOSAMAX) 70 mg tablet TAKE 1 TABLET EVERY 7 DAYS 12 Tab 3    traZODone (DESYREL) 50 mg tablet Take 1 tablet by mouth nightly 90 Tab 3    Calcium-Cholecalciferol, D3, 500 mg(1,250mg) -400 unit tab Take  by mouth.        Allergies   Allergen Reactions    Nsaids (Non-Steroidal Anti-Inflammatory Drug) Rash    Tolmetin Rash    Naprosyn [Naproxen] Nausea Only       Family History   Problem Relation Age of Onset    Diabetes Mother     Diabetes Sister     Stroke Son     Breast Cancer Daughter 72     Social History     Tobacco Use    Smoking status: Former Smoker     Packs/day: 1.00     Years: 0.00     Pack years: 0.00     Quit date: 1990     Years since quittin.2    Smokeless tobacco: Never Used   Substance Use Topics    Alcohol use: No     Alcohol/week: 0.0 standard drinks         Dona Harris MD

## 2021-07-20 NOTE — PROGRESS NOTES
Chief Complaint   Patient presents with    Annual Wellness Visit     Clock Draw Test Done    Patient has not been out of the country in (14 months), NO diarrhea, NO cough, NO chest conjestion, NO temp. Pt has not been around anyone with these symptoms. Health Maintenance reviewed. I have reviewed the patient's medical history in detail and updated the computerized patient record. 1. Have you been to the ER, urgent care clinic since your last visit? No Hospitalized since your last visit?  no    2. Have you seen or consulted any other health care providers outside of the [address] [place] [address] since your last visit? No Include any pap smears or colon screening. Encouraged pt to discuss pt's wishes with spouse/partner/family and bring them in the next appt to follow thru with the Advanced Directive    @  [address], last 12 mths 7/20/2021   Able to walk? Yes   Fall in past 12 months? 0   Do you feel unsteady? 0   Are you worried about falling 0   Number of falls in past 12 months -   Fall with injury? -       3 most recent PHQ Screens 7/20/2021   Little interest or pleasure in doing things Nearly every day   Feeling down, depressed, irritable, or hopeless Nearly every day   Total Score PHQ 2 6       Abuse Screening Questionnaire 7/20/2021   Do you ever feel afraid of your partner? N   Are you in a relationship with someone who physically or mentally threatens you? N   Is it safe for you to go home?  Y       ADL Assessment 7/20/2021   Feeding yourself No Help Needed   Getting from bed to chair No Help Needed   Getting dressed No Help Needed   Bathing or showering No Help Needed   Walk across the room (includes cane/walker) No Help Needed   Using the telphone No Help Needed   Taking your medications No Help Needed   Preparing meals No Help Needed   Managing money (expenses/bills) No Help Needed   Moderately strenuous housework (laundry) No Help Needed   Shopping for personal items (toiletries/medicines) No Help Needed   Shopping for groceries No Help Needed   Driving No Help Needed   Climbing a flight of stairs No Help Needed   Getting to places beyond walking distances No Help Needed

## 2021-07-20 NOTE — PATIENT INSTRUCTIONS

## 2021-07-22 DIAGNOSIS — E11.65 TYPE 2 DIABETES MELLITUS WITH HYPERGLYCEMIA, WITHOUT LONG-TERM CURRENT USE OF INSULIN (HCC): Primary | ICD-10-CM

## 2021-07-22 LAB
BUN SERPL-MCNC: 24 MG/DL (ref 8–27)
BUN/CREAT SERPL: 18 (ref 12–28)
CALCIUM SERPL-MCNC: 9.5 MG/DL (ref 8.7–10.3)
CHLORIDE SERPL-SCNC: 103 MMOL/L (ref 96–106)
CO2 SERPL-SCNC: 22 MMOL/L (ref 20–29)
CREAT SERPL-MCNC: 1.3 MG/DL (ref 0.57–1)
GLUCOSE SERPL-MCNC: 278 MG/DL (ref 65–99)
INTERPRETATION: NORMAL
POTASSIUM SERPL-SCNC: 3.9 MMOL/L (ref 3.5–5.2)
SODIUM SERPL-SCNC: 138 MMOL/L (ref 134–144)

## 2021-07-22 NOTE — PROGRESS NOTES
Per order of Ju Baez MD notified patient that her blood glucose level is 278 , which is elevated - patient states that she had eaten 2 meals before going to get that lab test drawn, possibly even something sweet, since its her weakness - advised that Dr Марина Simons has ordered another lab test that she needs to have drawn at her convenience , and A1C level, which will check her blood glucose level over the last 3 months - patient states she can get this done next week  Chandler Bartholomew LPN  6/32/8340  4:81 PM

## 2021-07-27 LAB
EST. AVERAGE GLUCOSE BLD GHB EST-MCNC: 157 MG/DL
HBA1C MFR BLD: 7.1 % (ref 4.8–5.6)

## 2021-07-27 NOTE — PROGRESS NOTES
Send normal/stable results letter. Your results are normal/stable. If not signed up, consider getting my chart to get your results on-line. We can help you to sign up. Diabetes is not as good as it was, but it isn't too bad currently. For now no change in your medications. Please follow the diabetic diet carefully. Reduce starchy foods and sweet foods. Will need to re-check this levels in a few months. If diabetes worsens will need to increase your medications.

## 2021-07-30 DIAGNOSIS — F51.02 INSOMNIA DUE TO PSYCHOLOGICAL STRESS: ICD-10-CM

## 2021-07-31 RX ORDER — TRAZODONE HYDROCHLORIDE 50 MG/1
TABLET ORAL
Qty: 90 TABLET | Refills: 3 | Status: SHIPPED | OUTPATIENT
Start: 2021-07-31 | End: 2022-06-16

## 2021-08-12 RX ORDER — OXYBUTYNIN CHLORIDE 5 MG/1
TABLET ORAL
Qty: 90 TABLET | Refills: 1 | Status: SHIPPED | OUTPATIENT
Start: 2021-08-12 | End: 2021-11-01 | Stop reason: ALTCHOICE

## 2021-09-28 RX ORDER — AMLODIPINE BESYLATE 5 MG/1
5 TABLET ORAL DAILY
Qty: 90 TABLET | Refills: 1 | Status: SHIPPED | OUTPATIENT
Start: 2021-09-28 | End: 2022-02-02 | Stop reason: SDUPTHER

## 2021-09-30 RX ORDER — LISINOPRIL 20 MG/1
TABLET ORAL
Qty: 90 TABLET | Refills: 1 | Status: SHIPPED | OUTPATIENT
Start: 2021-09-30 | End: 2021-11-01 | Stop reason: ALTCHOICE

## 2021-10-19 ENCOUNTER — TELEPHONE (OUTPATIENT)
Dept: INTERNAL MEDICINE CLINIC | Age: 81
End: 2021-10-19

## 2021-10-19 DIAGNOSIS — E11.65 TYPE 2 DIABETES MELLITUS WITH HYPERGLYCEMIA, WITHOUT LONG-TERM CURRENT USE OF INSULIN (HCC): ICD-10-CM

## 2021-10-19 DIAGNOSIS — E78.2 MIXED HYPERLIPIDEMIA: Primary | ICD-10-CM

## 2021-10-19 DIAGNOSIS — I10 ESSENTIAL HYPERTENSION: ICD-10-CM

## 2021-10-26 LAB
BUN SERPL-MCNC: 34 MG/DL (ref 8–27)
BUN/CREAT SERPL: 13 (ref 12–28)
CALCIUM SERPL-MCNC: 9.5 MG/DL (ref 8.7–10.3)
CHLORIDE SERPL-SCNC: 103 MMOL/L (ref 96–106)
CHOLEST SERPL-MCNC: 254 MG/DL (ref 100–199)
CO2 SERPL-SCNC: 20 MMOL/L (ref 20–29)
CREAT SERPL-MCNC: 2.53 MG/DL (ref 0.57–1)
EST. AVERAGE GLUCOSE BLD GHB EST-MCNC: 146 MG/DL
GLUCOSE SERPL-MCNC: 144 MG/DL (ref 65–99)
HBA1C MFR BLD: 6.7 % (ref 4.8–5.6)
HDLC SERPL-MCNC: 40 MG/DL
IMP & REVIEW OF LAB RESULTS: NORMAL
INTERPRETATION: NORMAL
LDLC SERPL CALC-MCNC: 179 MG/DL (ref 0–99)
POTASSIUM SERPL-SCNC: 4.4 MMOL/L (ref 3.5–5.2)
SODIUM SERPL-SCNC: 138 MMOL/L (ref 134–144)
TRIGL SERPL-MCNC: 188 MG/DL (ref 0–149)
VLDLC SERPL CALC-MCNC: 35 MG/DL (ref 5–40)

## 2021-11-01 ENCOUNTER — OFFICE VISIT (OUTPATIENT)
Dept: INTERNAL MEDICINE CLINIC | Age: 81
End: 2021-11-01
Payer: MEDICARE

## 2021-11-01 VITALS
OXYGEN SATURATION: 98 % | RESPIRATION RATE: 20 BRPM | HEIGHT: 63 IN | BODY MASS INDEX: 24.63 KG/M2 | TEMPERATURE: 98.1 F | SYSTOLIC BLOOD PRESSURE: 140 MMHG | WEIGHT: 139 LBS | HEART RATE: 92 BPM | DIASTOLIC BLOOD PRESSURE: 77 MMHG

## 2021-11-01 DIAGNOSIS — Z23 NEEDS FLU SHOT: ICD-10-CM

## 2021-11-01 DIAGNOSIS — E11.00 TYPE 2 DIABETES MELLITUS WITH HYPEROSMOLARITY WITHOUT COMA, WITHOUT LONG-TERM CURRENT USE OF INSULIN (HCC): ICD-10-CM

## 2021-11-01 DIAGNOSIS — I10 ESSENTIAL HYPERTENSION: ICD-10-CM

## 2021-11-01 DIAGNOSIS — N18.4 CRI (CHRONIC RENAL INSUFFICIENCY), STAGE 4 (SEVERE) (HCC): Primary | ICD-10-CM

## 2021-11-01 PROBLEM — E11.9 TYPE 2 DIABETES MELLITUS (HCC): Status: ACTIVE | Noted: 2021-11-01

## 2021-11-01 LAB
BILIRUB UR QL STRIP: NEGATIVE
CREAT UR-MCNC: 101 MG/DL
GLUCOSE UR-MCNC: NEGATIVE MG/DL
KETONES P FAST UR STRIP-MCNC: NEGATIVE MG/DL
MICROALBUMIN UR-MCNC: 5.33 MG/DL
MICROALBUMIN/CREAT UR-RTO: 53 MG/G (ref 0–30)
PH UR STRIP: 5.5 [PH] (ref 4.6–8)
PROT UR QL STRIP: NEGATIVE
SP GR UR STRIP: 1.01 (ref 1–1.03)
UA UROBILINOGEN AMB POC: NORMAL (ref 0.2–1)
URINALYSIS CLARITY POC: CLEAR
URINALYSIS COLOR POC: YELLOW
URINE BLOOD POC: NEGATIVE
URINE LEUKOCYTES POC: NORMAL
URINE NITRITES POC: NEGATIVE

## 2021-11-01 PROCEDURE — 1101F PT FALLS ASSESS-DOCD LE1/YR: CPT | Performed by: FAMILY MEDICINE

## 2021-11-01 PROCEDURE — 1090F PRES/ABSN URINE INCON ASSESS: CPT | Performed by: FAMILY MEDICINE

## 2021-11-01 PROCEDURE — G8427 DOCREV CUR MEDS BY ELIG CLIN: HCPCS | Performed by: FAMILY MEDICINE

## 2021-11-01 PROCEDURE — 99214 OFFICE O/P EST MOD 30 MIN: CPT | Performed by: FAMILY MEDICINE

## 2021-11-01 PROCEDURE — G8420 CALC BMI NORM PARAMETERS: HCPCS | Performed by: FAMILY MEDICINE

## 2021-11-01 PROCEDURE — G8536 NO DOC ELDER MAL SCRN: HCPCS | Performed by: FAMILY MEDICINE

## 2021-11-01 PROCEDURE — G8754 DIAS BP LESS 90: HCPCS | Performed by: FAMILY MEDICINE

## 2021-11-01 PROCEDURE — 81002 URINALYSIS NONAUTO W/O SCOPE: CPT | Performed by: FAMILY MEDICINE

## 2021-11-01 PROCEDURE — G8510 SCR DEP NEG, NO PLAN REQD: HCPCS | Performed by: FAMILY MEDICINE

## 2021-11-01 PROCEDURE — 90694 VACC AIIV4 NO PRSRV 0.5ML IM: CPT | Performed by: FAMILY MEDICINE

## 2021-11-01 PROCEDURE — G0008 ADMIN INFLUENZA VIRUS VAC: HCPCS | Performed by: FAMILY MEDICINE

## 2021-11-01 PROCEDURE — G8753 SYS BP > OR = 140: HCPCS | Performed by: FAMILY MEDICINE

## 2021-11-01 NOTE — PATIENT INSTRUCTIONS
Vaccine Information Statement    Influenza (Flu) Vaccine (Inactivated or Recombinant): What You Need to Know    Many vaccine information statements are available in Italian and other languages. See www.immunize.org/vis. Hojas de información sobre vacunas están disponibles en español y en muchos otros idiomas. Visite www.immunize.org/vis. 1. Why get vaccinated? Influenza vaccine can prevent influenza (flu). Flu is a contagious disease that spreads around the [facility] every year, usually between October and May. Anyone can get the flu, but it is more dangerous for some people. Infants and young children, people 72 years and older, pregnant people, and people with certain health conditions or a weakened immune system are at greatest risk of flu complications. Pneumonia, bronchitis, sinus infections, and ear infections are examples of flu-related complications. If you have a medical condition, such as heart disease, cancer, or diabetes, flu can make it worse. Flu can cause fever and chills, sore throat, muscle aches, fatigue, cough, headache, and runny or stuffy nose. Some people may have vomiting and diarrhea, though this is more common in children than adults. In an average year, thousands of people in the [facility] die from flu, and many more are hospitalized. Flu vaccine prevents millions of illnesses and flu-related visits to the doctor each year. 2. Influenza vaccines     CDC recommends everyone 6 months and older get vaccinated every flu season. Children 6 months through 6years of age may need 2 doses during a single flu season. Everyone else needs only 1 dose each flu season. It takes about 2 weeks for protection to develop after vaccination. There are many flu viruses, and they are always changing. Each year a new flu vaccine is made to protect against the influenza viruses believed to be likely to cause disease in the upcoming flu season.  Even when the vaccine doesnt exactly match these viruses, it may still provide some protection. Influenza vaccine does not cause flu. Influenza vaccine may be given at the same time as other vaccines. 3. Talk with your health care provider    Tell your vaccination provider if the person getting the vaccine:   Has had an allergic reaction after a previous dose of influenza vaccine, or has any severe, life-threatening allergies    Has ever had Guillain-Barré Syndrome (also called GBS)    In some cases, your health care provider may decide to postpone influenza vaccination until a future visit. Influenza vaccine can be administered at any time during pregnancy. People who are or will be pregnant during influenza season should receive inactivated influenza vaccine. People with minor illnesses, such as a cold, may be vaccinated. People who are moderately or severely ill should usually wait until they recover before getting influenza vaccine. Your health care provider can give you more information. 4. Risks of a vaccine reaction     Soreness, redness, and swelling where the shot is given, fever, muscle aches, and headache can happen after influenza vaccination.  There may be a very small increased risk of Guillain-Barré Syndrome (GBS) after inactivated influenza vaccine (the flu shot). Mark Graham children who get the flu shot along with pneumococcal vaccine (PCV13) and/or DTaP vaccine at the same time might be slightly more likely to have a seizure caused by fever. Tell your health care provider if a child who is getting flu vaccine has ever had a seizure. People sometimes faint after medical procedures, including vaccination. Tell your provider if you feel dizzy or have vision changes or ringing in the ears. As with any medicine, there is a very remote chance of a vaccine causing a severe allergic reaction, other serious injury, or death. 5. What if there is a serious problem?     An allergic reaction could occur after the vaccinated person leaves the clinic. If you see signs of a severe allergic reaction (hives, swelling of the face and throat, difficulty breathing, a fast heartbeat, dizziness, or weakness), call 9-1-1 and get the person to the nearest hospital.    For other signs that concern you, call your health care provider. Adverse reactions should be reported to the Vaccine Adverse Event Reporting System (VAERS). Your health care provider will usually file this report, or you can do it yourself. Visit the VAERS website at www.vaers. [facility].gov or call 1-997.311.5394. VAERS is only for reporting reactions, and VAERS staff members do not give medical advice. 6. The National Vaccine Injury Compensation Program    The [facility] Vaccine Injury Compensation Program (VICP) is a federal program that was created to compensate people who may have been injured by certain vaccines. Claims regarding alleged injury or death due to vaccination have a time limit for filing, which may be as short as two years. Visit the VICP website at www.[facility].gov/vaccinecompensation or call 0-287.201.4800 to learn about the program and about filing a claim. 7. How can I learn more?  Ask your health care provider.  Call your local or state health department.  Visit the website of the Food and Drug Administration (FDA) for vaccine package inserts and additional information at www.fda.gov/vaccines-blood-biologics/vaccines.  Contact the Centers for Disease Control and Prevention (CDC):  - Call 3-983.250.8387 (1-800-CDC-INFO) or  - Visit CDCs influenza website at www.cdc.gov/flu. Vaccine Information Statement   Inactivated Influenza Vaccine   8/6/2021  42 AR Mitzy Ibarra [id number]   Department of Health and Human Services  Centers for Disease Control and Prevention    Office Use Only

## 2021-11-01 NOTE — PROGRESS NOTES
Subjective: Abelino Rhodes is a 79 y.o. female who presents for follow up of hypertension. DM    New concerns: [place] did not bring aides, doing well off ACE  Off nsaids Cr inc to 2.5    Diet and Lifestyle: follows a diabetic diet regularly    Cardiovascular ROS:   Reports taking blood pressure medications without side affects. No complaints of exertional chest pain, excessive shortness of breath or focal weakness. Minimal swelling in lower legs or dizziness with standing. Review of Systems, additional:  Pertinent items are noted in HPI. No urination issues    Patient Active Problem List    Diagnosis Date Noted    Type 2 diabetes mellitus 11/01/2021    Elevated blood sugar 10/07/2020    Hip fracture requiring operative repair, left, closed, with routine healing, subsequent encounter 06/11/2019    Irritable bladder 06/11/2019    Insomnia due to stress 06/11/2019    GERD (gastroesophageal reflux disease) 06/11/2019    Diverticulosis 05/08/2014    Age related osteoporosis 12/13/2012    Essential hypertension 05/11/2010    Hyperlipidemia 05/11/2010     Current Outpatient Medications   Medication Sig Dispense Refill    amLODIPine (NORVASC) 5 mg tablet Take 1 Tablet by mouth daily. 90 Tablet 1    traZODone (DESYREL) 50 mg tablet TAKE 1 TABLET EVERY NIGHT 90 Tablet 3    mirabegron ER (Myrbetriq) 25 mg ER tablet Take 1 Tablet by mouth nightly. 30 Tablet 3    omeprazole (PRILOSEC) 20 mg capsule TAKE 1 CAPSULE EVERY DAY 90 Cap 3    alendronate (FOSAMAX) 70 mg tablet TAKE 1 TABLET EVERY 7 DAYS 12 Tab 3    Calcium-Cholecalciferol, D3, 500 mg(1,250mg) -400 unit tab Take  by mouth.        Allergies   Allergen Reactions    Nsaids (Non-Steroidal Anti-Inflammatory Drug) Rash    Tolmetin Rash    Naprosyn [Naproxen] Nausea Only     Past Medical History:   Diagnosis Date    Arthritis     Cancer ([facility] [address]     scalp skin CA    Carotid stenosis, bilateral 2011    <50%    Chronic sinusitis     Diverticulosis  GERD (gastroesophageal reflux disease)     Hyperlipidemia 2010    Hypertension     Insomnia due to stress     Irritable bladder     Osteoporosis     Prediabetes     Psychiatric disorder     anxiety    Shingles      Social History     Tobacco Use    Smoking status: Former Smoker     Packs/day: 1.00     Years: 0.00     Pack years: 0.00     Quit date: 1990     Years since quittin.4    Smokeless tobacco: Never Used   Substance Use Topics    Alcohol use: No     Alcohol/week: 0.0 standard drinks        Lab Results   Component Value Date/Time    GFR est non-AA 17 (L) 10/25/2021 08:37 AM    GFR est AA 20 (L) 10/25/2021 08:37 AM    Creatinine 2.53 (H) 10/25/2021 08:37 AM    BUN 34 (H) 10/25/2021 08:37 AM    Sodium 138 10/25/2021 08:37 AM    Potassium 4.4 10/25/2021 08:37 AM    Chloride 103 10/25/2021 08:37 AM    CO2 20 10/25/2021 08:37 AM     Lab Results   Component Value Date/Time    Glucose 144 (H) 10/25/2021 08:37 AM    Glucose  2017 01:54 PM             Objective:     Physical exam significant for the following:     edlerly  Visit Vitals  BP (!) 140/77 (BP 1 Location: Right arm, BP Patient Position: At rest, BP Cuff Size: Adult)   Pulse 92   Temp 98.1 °F (36.7 °C) (Oral)   Resp 20   Ht 5' 3\" (1.6 m)   Wt 139 lb (63 kg)   SpO2 98%   BMI 24.62 kg/m²     WD WN female NAD  Heart RRR without murmers clicks or rubs  Lungs CTA  Abdo soft nontender  Ext no edema    . Assessment/Plan:     hypertension stable. ICD-10-CM ICD-9-CM    1. CRI (chronic renal insufficiency), stage 4 (severe) ([facility])  N18.4 585.4 AMB POC URINALYSIS DIP STICK MANUAL W/O MICRO   2. Needs flu shot  Z23 V04.81 FLU (FLUAD QUAD INFLUENZA VACCINE,QUAD,ADJUVANTED)      ADMIN INFLUENZA VIRUS VAC   3. Essential hypertension  Q99 976.5 METABOLIC PANEL, BASIC   4.  Type 2 diabetes mellitus with hyperosmolarity without coma, without long-term current use of insulin ([facility])  E11.00 250.20 MICROALBUMIN, UR, RAND W/ MICROALB/CREAT RATIO      REFERRAL TO OPTOMETRY       Orders Placed This Encounter    Influenza Vaccine, QUAD, 65 Yrs +  IM  (Fluad [id number] )    METABOLIC PANEL, BASIC     Standing Status:   Future     Standing Expiration Date:   5/4/2022    MICROALBUMIN, UR, RAND W/ MICROALB/CREAT RATIO     Standing Status:   Future     Standing Expiration Date:   11/1/2022    REFERRAL TO OPTOMETRY     Referral Priority:   Routine     Referral Type:   Consultation     Referral Reason:   Specialty Services Required     Referred to Provider:   Chaim Hedrick OD    AMB POC URINALYSIS DIP STICK MANUAL W/O MICRO    ADMIN INFLUENZA VIRUS VAC     Ref nephro referral  Repeat BMP  Discussed poss need for dialysis  F/u 3 mo

## 2021-11-02 LAB
BUN SERPL-MCNC: 33 MG/DL (ref 8–27)
BUN/CREAT SERPL: 15 (ref 12–28)
CALCIUM SERPL-MCNC: 9.7 MG/DL (ref 8.7–10.3)
CHLORIDE SERPL-SCNC: 103 MMOL/L (ref 96–106)
CO2 SERPL-SCNC: 21 MMOL/L (ref 20–29)
COMMENT, HOLDF: NORMAL
CREAT SERPL-MCNC: 2.25 MG/DL (ref 0.57–1)
GLUCOSE SERPL-MCNC: 112 MG/DL (ref 65–99)
POTASSIUM SERPL-SCNC: 4.3 MMOL/L (ref 3.5–5.2)
SAMPLES BEING HELD,HOLD: NORMAL
SODIUM SERPL-SCNC: 139 MMOL/L (ref 134–144)

## 2021-11-02 NOTE — PROGRESS NOTES
Send normal/stable results letter. Your results are normal/stable. If not signed up, consider getting my chart to get your results on-line. We can help you to sign up. Creatinine elevated still but improved.

## 2021-12-02 DIAGNOSIS — K21.9 GERD WITHOUT ESOPHAGITIS: ICD-10-CM

## 2021-12-02 RX ORDER — OMEPRAZOLE 20 MG/1
CAPSULE, DELAYED RELEASE ORAL
Qty: 90 CAPSULE | Refills: 3 | Status: SHIPPED | OUTPATIENT
Start: 2021-12-02 | End: 2022-09-19

## 2021-12-30 RX ORDER — OXYBUTYNIN CHLORIDE 5 MG/1
TABLET ORAL
Qty: 90 TABLET | Refills: 1 | Status: SHIPPED | OUTPATIENT
Start: 2021-12-30 | End: 2022-06-23

## 2022-02-02 ENCOUNTER — OFFICE VISIT (OUTPATIENT)
Dept: INTERNAL MEDICINE CLINIC | Age: 82
End: 2022-02-02
Payer: MEDICARE

## 2022-02-02 VITALS
RESPIRATION RATE: 20 BRPM | OXYGEN SATURATION: 98 % | DIASTOLIC BLOOD PRESSURE: 82 MMHG | SYSTOLIC BLOOD PRESSURE: 140 MMHG | WEIGHT: 140 LBS | TEMPERATURE: 98.1 F | HEIGHT: 63 IN | BODY MASS INDEX: 24.8 KG/M2 | HEART RATE: 93 BPM

## 2022-02-02 DIAGNOSIS — I10 ESSENTIAL HYPERTENSION: Primary | ICD-10-CM

## 2022-02-02 DIAGNOSIS — N18.4 CRI (CHRONIC RENAL INSUFFICIENCY), STAGE 4 (SEVERE) (HCC): ICD-10-CM

## 2022-02-02 DIAGNOSIS — E11.65 TYPE 2 DIABETES MELLITUS WITH HYPERGLYCEMIA, WITHOUT LONG-TERM CURRENT USE OF INSULIN (HCC): ICD-10-CM

## 2022-02-02 DIAGNOSIS — M80.00XD AGE-RELATED OSTEOPOROSIS WITH CURRENT PATHOLOGICAL FRACTURE WITH ROUTINE HEALING, SUBSEQUENT ENCOUNTER: ICD-10-CM

## 2022-02-02 PROCEDURE — 1101F PT FALLS ASSESS-DOCD LE1/YR: CPT | Performed by: FAMILY MEDICINE

## 2022-02-02 PROCEDURE — G8754 DIAS BP LESS 90: HCPCS | Performed by: FAMILY MEDICINE

## 2022-02-02 PROCEDURE — G8510 SCR DEP NEG, NO PLAN REQD: HCPCS | Performed by: FAMILY MEDICINE

## 2022-02-02 PROCEDURE — G8536 NO DOC ELDER MAL SCRN: HCPCS | Performed by: FAMILY MEDICINE

## 2022-02-02 PROCEDURE — 99214 OFFICE O/P EST MOD 30 MIN: CPT | Performed by: FAMILY MEDICINE

## 2022-02-02 PROCEDURE — G8753 SYS BP > OR = 140: HCPCS | Performed by: FAMILY MEDICINE

## 2022-02-02 PROCEDURE — G8427 DOCREV CUR MEDS BY ELIG CLIN: HCPCS | Performed by: FAMILY MEDICINE

## 2022-02-02 PROCEDURE — 1090F PRES/ABSN URINE INCON ASSESS: CPT | Performed by: FAMILY MEDICINE

## 2022-02-02 PROCEDURE — G8420 CALC BMI NORM PARAMETERS: HCPCS | Performed by: FAMILY MEDICINE

## 2022-02-02 RX ORDER — AMLODIPINE BESYLATE 5 MG/1
5 TABLET ORAL DAILY
Qty: 90 TABLET | Refills: 1 | Status: SHIPPED | OUTPATIENT
Start: 2022-02-02 | End: 2022-07-09

## 2022-02-02 NOTE — PROGRESS NOTES
Chief Complaint   Patient presents with    Blood Pressure Check     check up on kidneys     Fall Risk Assessment, last 12 mths 2/2/2022   Able to walk? Yes   Fall in past 12 months? 0   Do you feel unsteady? 0   Are you worried about falling 0   Number of falls in past 12 months -   Fall with injury? -       3 most recent PHQ Screens 2/2/2022   Little interest or pleasure in doing things Not at all   Feeling down, depressed, irritable, or hopeless Not at all   Total Score PHQ 2 0       Abuse Screening Questionnaire 11/1/2021   Do you ever feel afraid of your partner? N   Are you in a relationship with someone who physically or mentally threatens you? N   Is it safe for you to go home?  Y       ADL Assessment 11/1/2021   Feeding yourself No Help Needed   Getting from bed to chair No Help Needed   Getting dressed No Help Needed   Bathing or showering No Help Needed   Walk across the room (includes cane/walker) No Help Needed   Using the telphone No Help Needed   Taking your medications No Help Needed   Preparing meals No Help Needed   Managing money (expenses/bills) No Help Needed   Moderately strenuous housework (laundry) No Help Needed   Shopping for personal items (toiletries/medicines) No Help Needed   Shopping for groceries No Help Needed   Driving No Help Needed   Climbing a flight of stairs No Help Needed   Getting to places beyond walking distances No Help Needed

## 2022-02-02 NOTE — PROGRESS NOTES
Subjective: Lamin Reis is a 79 y.o. female who presents for follow up of hypertension. New concerns: some knee arhtrits OTC work OK, 2 bladder meds, min c/o urination issues  Dm diet controlled  Lab Results   Component Value Date/Time    Hemoglobin A1c 6.7 (H) 10/25/2021 08:37 AM    Hemoglobin A1c 7.1 (H) 07/26/2021 08:19 AM    Hemoglobin A1c 6.3 (H) 10/13/2014 07:57 AM    Glucose 112 (H) 11/01/2021 11:17 AM    Glucose  04/04/2017 01:54 PM    Microalbumin/Creat ratio (mg/g creat) 53 (H) 11/01/2021 02:32 PM    Microalbumin,urine random 5.33 11/01/2021 02:32 PM    LDL, calculated 179 (H) 10/25/2021 08:37 AM    LDL, calculated 137 (H) 07/10/2020 08:32 AM    Creatinine 2.25 (H) 11/01/2021 11:17 AM         Diet and Lifestyle: nonsmoker    Cardiovascular ROS:   Reports taking blood pressure medications without side affects. No complaints of exertional chest pain, excessive shortness of breath or focal weakness. Minimal swelling in lower legs or dizziness with standing. Review of Systems, additional:  Pertinent items are noted in HPI. Patient Active Problem List    Diagnosis Date Noted    Type 2 diabetes mellitus 11/01/2021    Elevated blood sugar 10/07/2020    Hip fracture requiring operative repair, left, closed, with routine healing, subsequent encounter 06/11/2019    Irritable bladder 06/11/2019    Insomnia due to stress 06/11/2019    GERD (gastroesophageal reflux disease) 06/11/2019    Diverticulosis 05/08/2014    Age related osteoporosis 12/13/2012    Essential hypertension 05/11/2010    Hyperlipidemia 05/11/2010     Current Outpatient Medications   Medication Sig Dispense Refill    amLODIPine (NORVASC) 5 mg tablet Take 1 Tablet by mouth daily.  90 Tablet 1    omeprazole (PRILOSEC) 20 mg capsule TAKE 1 CAPSULE EVERY DAY 90 Capsule 3    traZODone (DESYREL) 50 mg tablet TAKE 1 TABLET EVERY NIGHT 90 Tablet 3    mirabegron ER (Myrbetriq) 25 mg ER tablet Take 1 Tablet by mouth nightly. 30 Tablet 3    alendronate (FOSAMAX) 70 mg tablet TAKE 1 TABLET EVERY 7 DAYS 12 Tab 3    Calcium-Cholecalciferol, D3, 500 mg(1,250mg) -400 unit tab Take  by mouth.  oxybutynin (DITROPAN) 5 mg tablet TAKE 1 TABLET EVERY NIGHT (Patient not taking: Reported on 2022) 90 Tablet 1   ? Taking oxybutynon    Allergies   Allergen Reactions    Nsaids (Non-Steroidal Anti-Inflammatory Drug) Rash    Tolmetin Rash    Naprosyn [Naproxen] Nausea Only     Past Medical History:   Diagnosis Date    Arthritis     Cancer ([facility] [address]     scalp skin [place]    Carotid stenosis, bilateral     <50%    Chronic sinusitis     Diverticulosis     GERD (gastroesophageal reflux disease)     Hyperlipidemia 2010    Hypertension     Insomnia due to stress     Irritable bladder     Osteoporosis     Prediabetes     Psychiatric disorder     anxiety    Shingles      Social History     Tobacco Use    Smoking status: Former Smoker     Packs/day: 1.00     Years: 0.00     Pack years: 0.00     Quit date: 1990     Years since quittin.7    Smokeless tobacco: Never Used   Substance Use Topics    Alcohol use: No     Alcohol/week: 0.0 standard drinks                 Objective:     Physical exam significant for the following:     eldrly  Visit Vitals  BP (!) 140/82 (BP 1 Location: Right upper arm, BP Patient Position: At rest, BP Cuff Size: Adult)   Pulse 93   Temp 98.1 °F (36.7 °C) (Oral)   Resp 20   Ht 5' 3\" (1.6 m)   Wt 140 lb (63.5 kg)   SpO2 98%   BMI 24.80 kg/m²     WD WN female NAD    . Assessment/Plan:     hypertension well controlled, stable. ICD-10-CM ICD-9-CM    1. Essential hypertension  I10 401.9 amLODIPine (NORVASC) 5 mg tablet      METABOLIC PANEL, BASIC   2. CRI (chronic renal insufficiency), stage 4 (severe) ([facility])  N18.4 585.4    3.  Type 2 diabetes mellitus with hyperglycemia, without long-term current use of insulin ([facility])  E11.65 250.00 amLODIPine (NORVASC) 5 mg tablet     790.29 HEMOGLOBIN A1C WITH EAG   4. Age-related osteoporosis with current pathological fracture with routine healing, subsequent encounter  M80.00XD [id number]      Orders Placed This Encounter    METABOLIC PANEL, BASIC     Standing Status:   Future     Standing Expiration Date:   8/5/2022    HEMOGLOBIN A1C WITH EAG     Standing Status:   Future     Standing Expiration Date:   2/2/2023    amLODIPine (NORVASC) 5 mg tablet     Sig: Take 1 Tablet by mouth daily. Dispense:  90 Tablet     Refill:  1         Orders Placed This Encounter    amLODIPine (NORVASC) 5 mg tablet     Sig: Take 1 Tablet by mouth daily. Dispense:  90 Tablet     Refill:  1        See patient instructions, went over them personally with the patient. Emphasized compliance. Questions answered. Patient states that they understand the plan of action and will call if there are any issues or misunderstandings.     Follow-up and Dispositions    · Return in about 5 months (around 7/2/2022) for medicare annual.

## 2022-02-02 NOTE — PATIENT INSTRUCTIONS
Try to wean oxybutynin maybe every other day for few weeks then twice weekly then once weekly then stop. We spent some time talking about medications on the [facility] list. I showed her the rather long list of medications that are inappropriatly prescribed for the elderly. Discussed possible side affects, precautions, and drug interactions and possible benefits of the medication(s).

## 2022-02-03 LAB
BUN SERPL-MCNC: 20 MG/DL (ref 8–27)
BUN/CREAT SERPL: 13 (ref 12–28)
CALCIUM SERPL-MCNC: 10.2 MG/DL (ref 8.7–10.3)
CHLORIDE SERPL-SCNC: 105 MMOL/L (ref 96–106)
CO2 SERPL-SCNC: 22 MMOL/L (ref 20–29)
CREAT SERPL-MCNC: 1.59 MG/DL (ref 0.57–1)
EST. AVERAGE GLUCOSE BLD GHB EST-MCNC: 137 MG/DL
GLUCOSE SERPL-MCNC: 111 MG/DL (ref 65–99)
HBA1C MFR BLD: 6.4 % (ref 4.8–5.6)
INTERPRETATION: NORMAL
POTASSIUM SERPL-SCNC: 5.1 MMOL/L (ref 3.5–5.2)
SODIUM SERPL-SCNC: 142 MMOL/L (ref 134–144)

## 2022-02-10 RX ORDER — LISINOPRIL 20 MG/1
TABLET ORAL
Qty: 90 TABLET | Refills: 1 | Status: SHIPPED | OUTPATIENT
Start: 2022-02-10 | End: 2022-07-09

## 2022-02-14 ENCOUNTER — TELEPHONE (OUTPATIENT)
Dept: INTERNAL MEDICINE CLINIC | Age: 82
End: 2022-02-14

## 2022-03-18 PROBLEM — F51.02 INSOMNIA DUE TO STRESS: Status: ACTIVE | Noted: 2019-06-11

## 2022-03-18 PROBLEM — N32.89 IRRITABLE BLADDER: Status: ACTIVE | Noted: 2019-06-11

## 2022-03-18 PROBLEM — S72.002D HIP FRACTURE REQUIRING OPERATIVE REPAIR, LEFT, CLOSED, WITH ROUTINE HEALING, SUBSEQUENT ENCOUNTER: Status: ACTIVE | Noted: 2019-06-11

## 2022-03-19 PROBLEM — K21.9 GERD (GASTROESOPHAGEAL REFLUX DISEASE): Status: ACTIVE | Noted: 2019-06-11

## 2022-03-19 PROBLEM — E11.9 TYPE 2 DIABETES MELLITUS (HCC): Status: ACTIVE | Noted: 2021-11-01

## 2022-03-20 PROBLEM — R73.9 ELEVATED BLOOD SUGAR: Status: ACTIVE | Noted: 2020-10-07

## 2022-06-23 RX ORDER — OXYBUTYNIN CHLORIDE 5 MG/1
TABLET ORAL
Qty: 90 TABLET | Refills: 1 | Status: SHIPPED | OUTPATIENT
Start: 2022-06-23

## 2022-07-06 ENCOUNTER — OFFICE VISIT (OUTPATIENT)
Dept: INTERNAL MEDICINE CLINIC | Age: 82
End: 2022-07-06
Payer: MEDICARE

## 2022-07-06 VITALS
TEMPERATURE: 98 F | SYSTOLIC BLOOD PRESSURE: 122 MMHG | HEART RATE: 88 BPM | BODY MASS INDEX: 24.45 KG/M2 | DIASTOLIC BLOOD PRESSURE: 82 MMHG | RESPIRATION RATE: 18 BRPM | OXYGEN SATURATION: 95 % | HEIGHT: 63 IN | WEIGHT: 138 LBS

## 2022-07-06 DIAGNOSIS — H91.13 PRESBYCUSIS OF BOTH EARS: ICD-10-CM

## 2022-07-06 DIAGNOSIS — Z13.39 SCREENING FOR ALCOHOLISM: ICD-10-CM

## 2022-07-06 DIAGNOSIS — N18.31 TYPE 2 DIABETES MELLITUS WITH STAGE 3A CHRONIC KIDNEY DISEASE, WITHOUT LONG-TERM CURRENT USE OF INSULIN (HCC): ICD-10-CM

## 2022-07-06 DIAGNOSIS — E11.22 TYPE 2 DIABETES MELLITUS WITH STAGE 3A CHRONIC KIDNEY DISEASE, WITHOUT LONG-TERM CURRENT USE OF INSULIN (HCC): ICD-10-CM

## 2022-07-06 DIAGNOSIS — Z00.00 MEDICARE ANNUAL WELLNESS VISIT, SUBSEQUENT: Primary | ICD-10-CM

## 2022-07-06 DIAGNOSIS — Z13.6 SCREENING FOR ISCHEMIC HEART DISEASE: ICD-10-CM

## 2022-07-06 DIAGNOSIS — N18.31 STAGE 3A CHRONIC KIDNEY DISEASE (HCC): ICD-10-CM

## 2022-07-06 DIAGNOSIS — Z23 ENCOUNTER FOR IMMUNIZATION: ICD-10-CM

## 2022-07-06 PROBLEM — N18.30 CHRONIC RENAL DISEASE, STAGE III (HCC): Status: ACTIVE | Noted: 2022-07-06

## 2022-07-06 PROCEDURE — G8427 DOCREV CUR MEDS BY ELIG CLIN: HCPCS | Performed by: FAMILY MEDICINE

## 2022-07-06 PROCEDURE — G8420 CALC BMI NORM PARAMETERS: HCPCS | Performed by: FAMILY MEDICINE

## 2022-07-06 PROCEDURE — G8536 NO DOC ELDER MAL SCRN: HCPCS | Performed by: FAMILY MEDICINE

## 2022-07-06 PROCEDURE — G8752 SYS BP LESS 140: HCPCS | Performed by: FAMILY MEDICINE

## 2022-07-06 PROCEDURE — G8510 SCR DEP NEG, NO PLAN REQD: HCPCS | Performed by: FAMILY MEDICINE

## 2022-07-06 PROCEDURE — 1101F PT FALLS ASSESS-DOCD LE1/YR: CPT | Performed by: FAMILY MEDICINE

## 2022-07-06 PROCEDURE — G0439 PPPS, SUBSEQ VISIT: HCPCS | Performed by: FAMILY MEDICINE

## 2022-07-06 PROCEDURE — G8754 DIAS BP LESS 90: HCPCS | Performed by: FAMILY MEDICINE

## 2022-07-06 NOTE — PATIENT INSTRUCTIONS

## 2022-07-06 NOTE — PROGRESS NOTES
This is the Subsequent Medicare Annual Wellness Exam, performed 12 months or more after the Initial AWV or the last Subsequent AWV    I have reviewed the patient's medical history in detail and updated the computerized patient record. Assessment/Plan   Education and counseling provided:  Are appropriate based on today's review and evaluation    1. Medicare annual wellness visit, subsequent  2. Screening for ischemic heart disease  3. Screening for alcoholism  -     [place] ANNUAL ALCOHOL SCREEN 15 MIN       Depression Risk Factor Screening     3 most recent PHQ Screens 7/6/2022   Little interest or pleasure in doing things Not at all   Feeling down, depressed, irritable, or hopeless Not at all   Total Score PHQ 2 0   Trouble falling or staying asleep, or sleeping too much Not at all   Feeling tired or having little energy Not at all   Poor appetite, weight loss, or overeating Not at all   Feeling bad about yourself - or that you are a failure or have let yourself or your family down Not at all   Trouble concentrating on things such as school, work, reading, or watching TV Not at all   Moving or speaking so slowly that other people could have noticed; or the opposite being so fidgety that others notice Not at all   Thoughts of being better off dead, or hurting yourself in some way Not at all   PHQ 9 Score 0   How difficult have these problems made it for you to do your work, take care of your home and get along with others Not difficult at all       Alcohol & Drug Abuse Risk Screen    Do you average more than 1 drink per night or more than 7 drinks a week:  No    On any one occasion in the past three months have you have had more than 3 drinks containing alcohol:  No          Functional Ability and Level of Safety    Hearing: The patient wears hearing aids. Activities of Daily Living: The home contains: no safety equipment.   Patient does total self care      Ambulation: with no difficulty     Fall Risk:  Fall Risk Assessment, last 12 mths 7/6/2022   Able to walk? Yes   Fall in past 12 months? 0   Do you feel unsteady? 0   Are you worried about falling 0   Number of falls in past 12 months -   Fall with injury?  -      Abuse Screen:  Patient is not abused       Cognitive Screening    Has your family/caregiver stated any concerns about your memory: no     Cognitive Screening: Normal - Clock Drawing Test    Health Maintenance Due     Health Maintenance Due   Topic Date Due    Eye Exam Retinal or Dilated  Never done    Shingrix Vaccine Age 49> (2 of 2) 01/03/2019       Patient Care Team   Patient Care Team:  Pietro Allen MD as PCP - General (Family Medicine)  Pietro Allen MD as PCP - [address] [place] [address]  [facility] Provider  [employer]    History     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 F51.02    GERD (gastroesophageal reflux disease) K21.9    Elevated blood sugar R73.9    Type 2 diabetes mellitus E11.9     Past Medical History:   Diagnosis Date    Arthritis     Cancer ([address]     scalp skin CA    Carotid stenosis, bilateral 2011    <50%    Chronic sinusitis     Diverticulosis     GERD (gastroesophageal reflux disease)     Hyperlipidemia 5/11/2010    Hypertension     Insomnia due to stress     Irritable bladder     Osteoporosis     Prediabetes     Psychiatric disorder     anxiety    Shingles       Past Surgical History:   Procedure Laterality Date    HX CATARACT REMOVAL  2014    HX COLONOSCOPY  2011    with EGD    HX GYN      hysterectomy    HX GYN      vaginal delivery x 3    HX HIP FRACTURE TX Left 04/19/2018    HX KNEE REPLACEMENT Left 07/28/2016    HX ORTHOPAEDIC Left 2017    Hip Fx Rx    HX TOTAL COLECTOMY  2014    for a perf    [place] ABDOMEN SURGERY PROC UNLISTED       Current Outpatient Medications   Medication Sig Dispense Refill    oxybutynin (DITROPAN) 5 mg tablet TAKE 1 TABLET EVERY NIGHT 90 Tablet 1    traZODone (DESYREL) 50 mg tablet TAKE 1 TABLET EVERY NIGHT 90 Tablet 1    busPIRone (BUSPAR) 5 mg tablet TAKE 1 TABLET TWICE DAILY FOR NERVES 180 Tablet 1    lisinopriL (PRINIVIL, ZESTRIL) 20 mg tablet TAKE 1 TABLET EVERY DAY 90 Tablet 1    amLODIPine (NORVASC) 5 mg tablet Take 1 Tablet by mouth daily. 90 Tablet 1    omeprazole (PRILOSEC) 20 mg capsule TAKE 1 CAPSULE EVERY DAY 90 Capsule 3    alendronate (FOSAMAX) 70 mg tablet TAKE 1 TABLET EVERY 7 DAYS 12 Tab 3    Calcium-Cholecalciferol, D3, 500 mg(1,250mg) -400 unit tab Take  by mouth.        Allergies   Allergen Reactions    Nsaids (Non-Steroidal Anti-Inflammatory Drug) Rash    Tolmetin Rash    Naprosyn [Naproxen] Nausea Only       Family History   Problem Relation Age of Onset    Diabetes Mother     Diabetes Sister     Stroke Son     Breast Cancer Daughter 72     Social History     Tobacco Use    Smoking status: Former Smoker     Packs/day: 1.00     Years: 0.00     Pack years: 0.00     Quit date: 1990     Years since quittin.1    Smokeless tobacco: Never Used   Substance Use Topics    Alcohol use: No     Alcohol/week: 0.0 standard drinks         Ean Rosenthal MD

## 2022-07-06 NOTE — PROGRESS NOTES
Quentin Barnhart is a 79 y.o. female  Chief Complaint   Patient presents with   [place] Annual Wellness Visit     1. Have you been to the ER, urgent care clinic since your last visit? Hospitalized since your last visit?no    2. Have you seen or consulted any other health care providers outside of the [address] [place] [address] since your last visit? Include any pap smears or colon screening.  No  Health Maintenance   Topic Date Due    Eye Exam Retinal or Dilated  Never done    Shingrix Vaccine Age 50> (2 of 2) 01/03/2019    Medicare Yearly Exam  07/21/2022    Flu Vaccine (1) 09/01/2022    Foot Exam Q1  11/01/2022    MICROALBUMIN Q1  11/01/2022    Depression Screen  02/02/2023    DTaP/Tdap/Td series (2 - Td or Tdap) 02/06/2027    COVID-19 Vaccine  Completed    Pneumococcal 65+ years  Completed     Visit Vitals  /82 (BP 1 Location: Left upper arm, BP Patient Position: At rest, BP Cuff Size: Large adult)   Pulse 88   Temp 98 °F (36.7 °C) (Skin)   Resp 18   Ht 5' 3\" (1.6 m)   Wt 138 lb (62.6 kg)   SpO2 95%   BMI 24.45 kg/m²

## 2022-07-07 LAB
BUN SERPL-MCNC: 19 MG/DL (ref 8–27)
BUN/CREAT SERPL: 14 (ref 12–28)
CALCIUM SERPL-MCNC: 10 MG/DL (ref 8.7–10.3)
CHLORIDE SERPL-SCNC: 100 MMOL/L (ref 96–106)
CO2 SERPL-SCNC: 23 MMOL/L (ref 20–29)
CREAT SERPL-MCNC: 1.39 MG/DL (ref 0.57–1)
EGFR: 38 ML/MIN/1.73
EST. AVERAGE GLUCOSE BLD GHB EST-MCNC: 160 MG/DL
GLUCOSE SERPL-MCNC: 119 MG/DL (ref 65–99)
HBA1C MFR BLD: 7.2 % (ref 4.8–5.6)
INTERPRETATION: NORMAL
POTASSIUM SERPL-SCNC: 3.8 MMOL/L (ref 3.5–5.2)
SODIUM SERPL-SCNC: 139 MMOL/L (ref 134–144)

## 2022-07-08 DIAGNOSIS — I10 ESSENTIAL HYPERTENSION: ICD-10-CM

## 2022-07-08 DIAGNOSIS — E11.65 TYPE 2 DIABETES MELLITUS WITH HYPERGLYCEMIA, WITHOUT LONG-TERM CURRENT USE OF INSULIN (HCC): ICD-10-CM

## 2022-07-09 RX ORDER — AMLODIPINE BESYLATE 5 MG/1
TABLET ORAL
Qty: 90 TABLET | Refills: 1 | Status: SHIPPED | OUTPATIENT
Start: 2022-07-09

## 2022-07-09 RX ORDER — LISINOPRIL 20 MG/1
TABLET ORAL
Qty: 90 TABLET | Refills: 1 | Status: SHIPPED | OUTPATIENT
Start: 2022-07-09

## 2022-07-28 ENCOUNTER — VIRTUAL VISIT (OUTPATIENT)
Dept: INTERNAL MEDICINE CLINIC | Age: 82
End: 2022-07-28
Payer: MEDICARE

## 2022-07-28 DIAGNOSIS — R05.9 COUGH: Primary | ICD-10-CM

## 2022-07-28 PROCEDURE — 99213 OFFICE O/P EST LOW 20 MIN: CPT | Performed by: FAMILY MEDICINE

## 2022-07-28 PROCEDURE — G8756 NO BP MEASURE DOC: HCPCS | Performed by: FAMILY MEDICINE

## 2022-07-28 PROCEDURE — 1090F PRES/ABSN URINE INCON ASSESS: CPT | Performed by: FAMILY MEDICINE

## 2022-07-28 PROCEDURE — G8536 NO DOC ELDER MAL SCRN: HCPCS | Performed by: FAMILY MEDICINE

## 2022-07-28 PROCEDURE — G8432 DEP SCR NOT DOC, RNG: HCPCS | Performed by: FAMILY MEDICINE

## 2022-07-28 PROCEDURE — G8420 CALC BMI NORM PARAMETERS: HCPCS | Performed by: FAMILY MEDICINE

## 2022-07-28 PROCEDURE — G8427 DOCREV CUR MEDS BY ELIG CLIN: HCPCS | Performed by: FAMILY MEDICINE

## 2022-07-28 PROCEDURE — 1101F PT FALLS ASSESS-DOCD LE1/YR: CPT | Performed by: FAMILY MEDICINE

## 2022-07-28 NOTE — PROGRESS NOTES
Chief Complaint   Patient presents with    Concern For COVID-19 (Coronavirus)     Patient is aware that this is a Virtual Visit or Phone Call Only doctor's visit. Patient has not been out of the country in (14 months), NO diarrhea, NO cough, NO chest conjestion, NO temp. Pt has not been around anyone with these symptoms. Health Maintenance reviewed. I have reviewed the patient's medical history in detail and updated the computerized patient record. Have you been to the ER, urgent care clinic since your last visit? No  Hospitalized since your last visit?  no    2. Have you seen or consulted any other health care providers outside of the [address] [place] [address] since your last visit? No Include any pap smears or colon screening. Encouraged pt to discuss pt's wishes with spouse/partner/family and bring them in the next appt to follow thru with the Advanced Directive      Fall Risk Assessment, last 12 mths 7/6/2022   Able to walk? Yes   Fall in past 12 months? 0   Do you feel unsteady? 0   Are you worried about falling 0   Number of falls in past 12 months -   Fall with injury?  -       3 most recent PHQ Screens 7/6/2022   Little interest or pleasure in doing things Not at all   Feeling down, depressed, irritable, or hopeless Not at all   Total Score PHQ 2 0   Trouble falling or staying asleep, or sleeping too much Not at all   Feeling tired or having little energy Not at all   Poor appetite, weight loss, or overeating Not at all   Feeling bad about yourself - or that you are a failure or have let yourself or your family down Not at all   Trouble concentrating on things such as school, work, reading, or watching TV Not at all   Moving or speaking so slowly that other people could have noticed; or the opposite being so fidgety that others notice Not at all   Thoughts of being better off dead, or hurting yourself in some way Not at all   PHQ 9 Score 0   How difficult have these problems made it for you to do your work, take care of your home and get along with others Not difficult at all       Abuse Screening Questionnaire 7/6/2022   Do you ever feel afraid of your partner? N   Are you in a relationship with someone who physically or mentally threatens you? N   Is it safe for you to go home?  Y       ADL Assessment 7/6/2022   Feeding yourself No Help Needed   Getting from bed to chair No Help Needed   Getting dressed No Help Needed   Bathing or showering No Help Needed   Walk across the room (includes cane/walker) No Help Needed   Using the telphone No Help Needed   Taking your medications No Help Needed   Preparing meals No Help Needed   Managing money (expenses/bills) No Help Needed   Moderately strenuous housework (laundry) No Help Needed   Shopping for personal items (toiletries/medicines) No Help Needed   Shopping for groceries No Help Needed   Driving No Help Needed   Climbing a flight of stairs No Help Needed   Getting to places beyond walking distances No Help Needed

## 2022-07-28 NOTE — PROGRESS NOTES
Subjective: Isai Esqueda is a 79 y.o. female who complains of congestion, sore throat, and dry cough for few days, gradually worsening since that time. She denies a history of fevers, shortness of breath, and wheezing. 4/4 vaccines  Son is +for covid  Evaluation to date: none. Treatment to date: none. Patient does not smoke cigarettes. Relevant PMH: DM    Allergies   Allergen Reactions    Nsaids (Non-Steroidal Anti-Inflammatory Drug) Rash    Tolmetin Rash    Naprosyn [Naproxen] Nausea Only         Patient Active Problem List    Diagnosis Date Noted    Type 2 diabetes mellitus with chronic kidney disease ([facility] [address] 07/06/2022    Chronic renal disease, stage III 07/06/2022    Type 2 diabetes mellitus 11/01/2021    Elevated blood sugar 10/07/2020    Hip fracture requiring operative repair, left, closed, with routine healing, subsequent encounter 06/11/2019    Irritable bladder 06/11/2019    Insomnia due to stress 06/11/2019    GERD (gastroesophageal reflux disease) 06/11/2019    Diverticulosis 05/08/2014    Age related osteoporosis 12/13/2012    Essential hypertension 05/11/2010    Hyperlipidemia 05/11/2010     Current Outpatient Medications   Medication Sig Dispense Refill    amLODIPine (NORVASC) 5 mg tablet TAKE 1 TABLET EVERY DAY 90 Tablet 1    lisinopriL (PRINIVIL, ZESTRIL) 20 mg tablet TAKE 1 TABLET EVERY DAY 90 Tablet 1    oxybutynin (DITROPAN) 5 mg tablet TAKE 1 TABLET EVERY NIGHT 90 Tablet 1    traZODone (DESYREL) 50 mg tablet TAKE 1 TABLET EVERY NIGHT 90 Tablet 1    busPIRone (BUSPAR) 5 mg tablet TAKE 1 TABLET TWICE DAILY FOR NERVES 180 Tablet 1    omeprazole (PRILOSEC) 20 mg capsule TAKE 1 CAPSULE EVERY DAY 90 Capsule 3    alendronate (FOSAMAX) 70 mg tablet TAKE 1 TABLET EVERY 7 DAYS 12 Tab 3    Calcium-Cholecalciferol, D3, 500 mg(1,250mg) -400 unit tab Take  by mouth.        Allergies   Allergen Reactions    Nsaids (Non-Steroidal Anti-Inflammatory Drug) Rash    Tolmetin Rash    Naprosyn [Naproxen] Nausea Only     Social History     Tobacco Use    Smoking status: Former     Packs/day: 1.00     Years: 0.00     Pack years: 0.00     Types: Cigarettes     Quit date: 1990     Years since quittin.2    Smokeless tobacco: Never   Substance Use Topics    Alcohol use: No     Alcohol/week: 0.0 standard drinks        Review of Systems  Pertinent items are noted in HPI. Objective: There were no vitals taken for this visit. General:  Sounds NAD   Eyes:    Ears:    Sinuses:    Mouth:     Neck:    Heart:    Lungs:    Abdomen:       Assessment/Plan:   viral upper respiratory illness high COVID prob  RTC prn. Encounter Diagnoses   Name Primary? Cough Yes     Orders Placed This Encounter    NOVEL CORONAVIRUS (COVID-19) ([facility] Default)   . COVID precautions  Will Rx paxlovid if +    Sweta Mejia is a 79 y.o. female who was phone evaluated on 2022. Consent:  She and/or her healthcare decision maker is aware that this patient-initiated Telehealth encounter is a billable service, with coverage as determined by her insurance carrier. She is aware that she may receive a bill and has provided verbal consent to proceed: Yes    I was at home while conducting this encounter. Assessment & Plan:   Diagnoses and all orders for this visit:    1. Cough  -     NOVEL CORONAVIRUS (COVID-19)          Follow-up and Dispositions    Return if symptoms worsen or fail to improve. 712  Subjective:      15 min      We discussed the expected course, resolution and complications of the diagnosis(es) in detail. Medication risks, benefits, costs, interactions, and alternatives were discussed as indicated. I advised her to contact the office if her condition worsens, changes or fails to improve as anticipated. She expressed understanding with the diagnosis(es) and plan.      Pursuant to the emergency declaration under the [facility], [address]5 waiver authority and the Coronavirus Preparedness and Response Supplemental Appropriations Act, this Virtual  Visit was conducted, with patient's consent, to reduce the patient's risk of exposure to COVID-19 and provide continuity of care for an established patient. Services were provided through a video synchronous discussion virtually to substitute for in-person clinic visit.     Valeria Franco MD

## 2022-07-29 LAB
SARS-COV-2, NAA 2 DAY TAT: NORMAL
SARS-COV-2, NAA: DETECTED

## 2022-07-30 ENCOUNTER — TELEPHONE (OUTPATIENT)
Dept: INTERNAL MEDICINE CLINIC | Age: 82
End: 2022-07-30

## 2022-07-30 DIAGNOSIS — U07.1 COVID-19: Primary | ICD-10-CM

## 2022-08-10 DIAGNOSIS — F51.02 INSOMNIA DUE TO PSYCHOLOGICAL STRESS: ICD-10-CM

## 2022-08-10 RX ORDER — BUSPIRONE HYDROCHLORIDE 5 MG/1
TABLET ORAL
Qty: 180 TABLET | Refills: 1 | Status: SHIPPED | OUTPATIENT
Start: 2022-08-10

## 2022-11-09 ENCOUNTER — OFFICE VISIT (OUTPATIENT)
Dept: INTERNAL MEDICINE CLINIC | Age: 82
End: 2022-11-09
Payer: MEDICARE

## 2022-11-09 VITALS
HEART RATE: 89 BPM | BODY MASS INDEX: 23.74 KG/M2 | WEIGHT: 134 LBS | TEMPERATURE: 98.2 F | DIASTOLIC BLOOD PRESSURE: 65 MMHG | HEIGHT: 63 IN | OXYGEN SATURATION: 97 % | SYSTOLIC BLOOD PRESSURE: 137 MMHG | RESPIRATION RATE: 16 BRPM

## 2022-11-09 DIAGNOSIS — H91.13 PRESBYCUSIS OF BOTH EARS: ICD-10-CM

## 2022-11-09 DIAGNOSIS — Z23 ENCOUNTER FOR IMMUNIZATION: ICD-10-CM

## 2022-11-09 DIAGNOSIS — I10 ESSENTIAL HYPERTENSION: ICD-10-CM

## 2022-11-09 DIAGNOSIS — E11.22 TYPE 2 DIABETES MELLITUS WITH STAGE 1 CHRONIC KIDNEY DISEASE, WITHOUT LONG-TERM CURRENT USE OF INSULIN (HCC): Primary | ICD-10-CM

## 2022-11-09 DIAGNOSIS — Z23 NEEDS FLU SHOT: ICD-10-CM

## 2022-11-09 DIAGNOSIS — H65.21 RIGHT CHRONIC SEROUS OTITIS MEDIA: ICD-10-CM

## 2022-11-09 DIAGNOSIS — E11.65 TYPE 2 DIABETES MELLITUS WITH HYPERGLYCEMIA, WITHOUT LONG-TERM CURRENT USE OF INSULIN (HCC): ICD-10-CM

## 2022-11-09 DIAGNOSIS — F51.02 INSOMNIA DUE TO PSYCHOLOGICAL STRESS: ICD-10-CM

## 2022-11-09 DIAGNOSIS — N18.1 TYPE 2 DIABETES MELLITUS WITH STAGE 1 CHRONIC KIDNEY DISEASE, WITHOUT LONG-TERM CURRENT USE OF INSULIN (HCC): Primary | ICD-10-CM

## 2022-11-09 PROCEDURE — 99214 OFFICE O/P EST MOD 30 MIN: CPT | Performed by: FAMILY MEDICINE

## 2022-11-09 PROCEDURE — 1101F PT FALLS ASSESS-DOCD LE1/YR: CPT | Performed by: FAMILY MEDICINE

## 2022-11-09 PROCEDURE — 90694 VACC AIIV4 NO PRSRV 0.5ML IM: CPT | Performed by: FAMILY MEDICINE

## 2022-11-09 PROCEDURE — G8420 CALC BMI NORM PARAMETERS: HCPCS | Performed by: FAMILY MEDICINE

## 2022-11-09 PROCEDURE — 1090F PRES/ABSN URINE INCON ASSESS: CPT | Performed by: FAMILY MEDICINE

## 2022-11-09 PROCEDURE — G8432 DEP SCR NOT DOC, RNG: HCPCS | Performed by: FAMILY MEDICINE

## 2022-11-09 PROCEDURE — G8536 NO DOC ELDER MAL SCRN: HCPCS | Performed by: FAMILY MEDICINE

## 2022-11-09 PROCEDURE — G8427 DOCREV CUR MEDS BY ELIG CLIN: HCPCS | Performed by: FAMILY MEDICINE

## 2022-11-09 PROCEDURE — G8752 SYS BP LESS 140: HCPCS | Performed by: FAMILY MEDICINE

## 2022-11-09 PROCEDURE — G0008 ADMIN INFLUENZA VIRUS VAC: HCPCS | Performed by: FAMILY MEDICINE

## 2022-11-09 PROCEDURE — G8754 DIAS BP LESS 90: HCPCS | Performed by: FAMILY MEDICINE

## 2022-11-09 RX ORDER — OXYBUTYNIN CHLORIDE 5 MG/1
5 TABLET ORAL
Qty: 90 TABLET | Refills: 1 | Status: SHIPPED | OUTPATIENT
Start: 2022-11-09

## 2022-11-09 RX ORDER — AMOXICILLIN 500 MG/1
500 TABLET, FILM COATED ORAL 3 TIMES DAILY
Qty: 21 TABLET | Refills: 0 | Status: SHIPPED | OUTPATIENT
Start: 2022-11-09 | End: 2022-11-09 | Stop reason: SDUPTHER

## 2022-11-09 RX ORDER — FLUTICASONE PROPIONATE 50 MCG
2 SPRAY, SUSPENSION (ML) NASAL DAILY
Qty: 1 EACH | Refills: 3 | Status: SHIPPED | OUTPATIENT
Start: 2022-11-09

## 2022-11-09 RX ORDER — LISINOPRIL 20 MG/1
20 TABLET ORAL DAILY
Qty: 90 TABLET | Refills: 1 | Status: SHIPPED | OUTPATIENT
Start: 2022-11-09

## 2022-11-09 RX ORDER — OXYBUTYNIN CHLORIDE 5 MG/1
5 TABLET ORAL
Qty: 90 TABLET | Refills: 1 | Status: SHIPPED | OUTPATIENT
Start: 2022-11-09 | End: 2022-11-09 | Stop reason: SDUPTHER

## 2022-11-09 RX ORDER — BUSPIRONE HYDROCHLORIDE 5 MG/1
5 TABLET ORAL 2 TIMES DAILY
Qty: 180 TABLET | Refills: 1 | Status: SHIPPED | OUTPATIENT
Start: 2022-11-09

## 2022-11-09 RX ORDER — AMLODIPINE BESYLATE 5 MG/1
TABLET ORAL
Qty: 90 TABLET | Refills: 1 | Status: SHIPPED | OUTPATIENT
Start: 2022-11-09

## 2022-11-09 RX ORDER — FLUTICASONE PROPIONATE 50 MCG
2 SPRAY, SUSPENSION (ML) NASAL DAILY
Qty: 1 EACH | Refills: 3 | Status: SHIPPED | OUTPATIENT
Start: 2022-11-09 | End: 2022-11-09 | Stop reason: SDUPTHER

## 2022-11-09 RX ORDER — AMOXICILLIN 500 MG/1
500 TABLET, FILM COATED ORAL 3 TIMES DAILY
Qty: 21 TABLET | Refills: 0 | Status: SHIPPED | OUTPATIENT
Start: 2022-11-09 | End: 2022-11-16

## 2022-11-09 NOTE — PROGRESS NOTES
Subjective: Mercedes Coats is a 79 y.o. female seen for follow-up of diabetes. She has had hypoglycemic attacks. .no  Blood sugar control has been ok    Lab Results   Component Value Date/Time    Hemoglobin A1c 7.2 (H) 07/06/2022 09:22 AM    Hemoglobin A1c 6.4 (H) 02/02/2022 11:07 AM    Hemoglobin A1c 6.7 (H) 10/25/2021 08:37 AM    Glucose 119 (H) 07/06/2022 09:22 AM    Glucose  04/04/2017 01:54 PM    Microalbumin/Creat ratio (mg/g creat) 53 (H) 11/01/2021 02:32 PM    Microalbumin,urine random 5.33 11/01/2021 02:32 PM    LDL, calculated 179 (H) 10/25/2021 08:37 AM    LDL, calculated 137 (H) 07/10/2020 08:32 AM    Creatinine 1.39 (H) 07/06/2022 09:22 AM       She has diabetes, hypertension, and hyperlipidemia. Mercedes Coats has the additional concern of dec hearing check ears    Reports taking blood pressure medications without side affects. No complaints of exertional chest pain, excessive shortness of breath or focal weakness. Minimal swelling in lower legs or dizziness with standing. Diet and Lifestyle: nonsmoker. Patient Active Problem List    Diagnosis Date Noted    Type 2 diabetes mellitus with chronic kidney disease ([facility] [address] 07/06/2022    Chronic renal disease, stage III 07/06/2022    Type 2 diabetes mellitus 11/01/2021    Elevated blood sugar 10/07/2020    Hip fracture requiring operative repair, left, closed, with routine healing, subsequent encounter 06/11/2019    Irritable bladder 06/11/2019    Insomnia due to stress 06/11/2019    GERD (gastroesophageal reflux disease) 06/11/2019    Diverticulosis 05/08/2014    Age related osteoporosis 12/13/2012    Essential hypertension 05/11/2010    Hyperlipidemia 05/11/2010     Current Outpatient Medications   Medication Sig Dispense Refill    fluticasone propionate (FLONASE) 50 mcg/actuation nasal spray 2 Sprays by Both Nostrils route daily. 1 Each 3    amoxicillin 500 mg tab Take 500 mg by mouth three (3) times daily for 7 days.  21 Tablet 0    oxybutynin (DITROPAN) 5 mg tablet Take 1 Tablet by mouth nightly. 90 Tablet 1    busPIRone (BUSPAR) 5 mg tablet Take 1 Tablet by mouth two (2) times a day. 180 Tablet 1    amLODIPine (NORVASC) 5 mg tablet TAKE 1 TABLET EVERY DAY 90 Tablet 1    lisinopriL (PRINIVIL, ZESTRIL) 20 mg tablet Take 1 Tablet by mouth daily. 90 Tablet 1    omeprazole (PRILOSEC) 20 mg capsule TAKE 1 CAPSULE EVERY DAY 90 Capsule 1    alendronate (FOSAMAX) 70 mg tablet TAKE 1 TABLET EVERY 7 DAYS 12 Tab 3    Calcium-Cholecalciferol, D3, 500 mg(1,250mg) -400 unit tab Take  by mouth. Allergies   Allergen Reactions    Nsaids (Non-Steroidal Anti-Inflammatory Drug) Rash    Tolmetin Rash    Naprosyn [Naproxen] Nausea Only     Past Medical History:   Diagnosis Date    Arthritis     Cancer ([facility] [address]     scalp skin CA    Carotid stenosis, bilateral 2011    <50%    Chronic sinusitis     Diverticulosis     GERD (gastroesophageal reflux disease)     Hyperlipidemia 2010    Hypertension     Insomnia due to stress     Irritable bladder     Osteoporosis     Prediabetes     Psychiatric disorder     anxiety    Shingles      Social History     Tobacco Use    Smoking status: Former     Packs/day: 1.00     Years: 0.00     Pack years: 0.00     Types: Cigarettes     Quit date: 1990     Years since quittin.5    Smokeless tobacco: Never   Substance Use Topics    Alcohol use: No     Alcohol/week: 0.0 standard drinks             Review of Systems  Pertinent items are noted in HPI. Objective:     Significant for the following: elderly  Visit Vitals  /65 (BP 1 Location: Left arm, BP Patient Position: Sitting, BP Cuff Size: Adult)   Pulse 89   Temp 98.2 °F (36.8 °C) (Temporal)   Resp 16   Ht 5' 3\" (1.6 m)   Wt 134 lb (60.8 kg)   SpO2 97%   BMI 23.74 kg/m²     WD WN female NAD        Lab review: labs reviewed, I note that glycosylated hemoglobin mildly abnormal but acceptable. Assessment/Plan:     Follow-up diabetes stable.   Diabetic issues reviewed with her: all medications, side effects and compliance discussed carefully and glycohemoglobin and other lab monitoring discussed. ICD-10-CM ICD-9-CM    1. Type 2 diabetes mellitus with stage 1 chronic kidney disease, without long-term current use of insulin ([facility])  E11.22 250.40 MICROALBUMIN, UR, RAND W/ MICROALB/CREAT RATIO    N18.1 585.1 HEMOGLOBIN A1C WITH EAG      2. Encounter for immunization  Z23 V03.89 ADMIN INFLUENZA VIRUS VAC      3. Right chronic serous otitis media  H65.21 381.10 fluticasone propionate (FLONASE) 50 mcg/actuation nasal spray      amoxicillin 500 mg tab      4. Essential hypertension  I25 465.4 METABOLIC PANEL, BASIC      5. Presbycusis of both ears  H91.13 388.01           Orders Placed This Encounter    MICROALBUMIN, UR, RAND W/ MICROALB/CREAT RATIO     Standing Status:   Future     Standing Expiration Date:   3/4/2784    METABOLIC PANEL, BASIC     Standing Status:   Future     Standing Expiration Date:   2023    HEMOGLOBIN A1C WITH EAG     Standing Status:   Future     Standing Expiration Date:   2023    ADMIN INFLUENZA VIRUS VAC    fluticasone propionate (FLONASE) 50 mcg/actuation nasal spray     Si Sprays by Both Nostrils route daily. Dispense:  1 Each     Refill:  3    amoxicillin 500 mg tab     Sig: Take 500 mg by mouth three (3) times daily for 7 days. Dispense:  21 Tablet     Refill:  0       Follow-up and Dispositions    Return in about 2 months (around 2023). Chronic Conditions Addressed Today       1. Type 2 diabetes mellitus with chronic kidney disease ([facility] [address] - Primary     Relevant Medications     lisinopriL (PRINIVIL, ZESTRIL) 20 mg tablet     Other Relevant Orders     MICROALBUMIN, UR, RAND W/ MICROALB/CREAT RATIO     HEMOGLOBIN A1C WITH EAG     INFLUENZA, FLUAD, (AGE 65 Y+), IM, PF, 0.5 ML (Completed)    2.  Type 2 diabetes mellitus     Relevant Medications     amLODIPine (NORVASC) 5 mg tablet     lisinopriL (PRINIVIL, ZESTRIL) 20 mg tablet     Other Relevant Orders     INFLUENZA, FLUAD, (AGE 72 Y+), IM, PF, 0.5 ML (Completed)     MICROALBUMIN, UR, RAND W/ MICROALB/CREAT RATIO     HEMOGLOBIN A1C WITH EAG     INFLUENZA, FLUAD, (AGE 65 Y+), IM, PF, 0.5 ML (Completed)    3.  Essential hypertension     Relevant Medications     amLODIPine (NORVASC) 5 mg tablet     lisinopriL (PRINIVIL, ZESTRIL) 20 mg tablet     Other Relevant Orders     METABOLIC PANEL, BASIC     INFLUENZA, FLUAD, (AGE 72 Y+), IM, PF, 0.5 ML (Completed)     Acute Diagnoses Addressed Today       Encounter for immunization            Relevant Orders        ADMIN INFLUENZA VIRUS VAC        INFLUENZA, FLUAD, (AGE 72 Y+), IM, PF, 0.5 ML (Completed)    Right chronic serous otitis media            Relevant Medications        fluticasone propionate (FLONASE) 50 mcg/actuation nasal spray        amoxicillin 500 mg tab        Other Relevant Orders        INFLUENZA, FLUAD, (AGE 72 Y+), IM, PF, 0.5 ML (Completed)    Presbycusis of both ears            Relevant Orders        INFLUENZA, FLUAD, (AGE 72 Y+), IM, PF, 0.5 ML (Completed)    Insomnia due to psychological stress            Relevant Medications        busPIRone (BUSPAR) 5 mg tablet        Other Relevant Orders        INFLUENZA, FLUAD, (AGE 72 Y+), IM, PF, 0.5 ML (Completed)    Needs flu shot            Relevant Orders        INFLUENZA, FLUAD, (AGE 65 Y+), IM, PF, 0.5 ML (Completed)            F/up 3 mo

## 2022-11-09 NOTE — PROGRESS NOTES
Chief Complaint   Patient presents with    Hearing Problem     Pt cannot hear well out of the R/ear     Patient has not been out of the country in (14 months), NO diarrhea, NO cough, NO chest conjestion, NO temp. Pt has not been around anyone with these symptoms. Health Maintenance reviewed. I have reviewed the patient's medical history in detail and updated the computerized patient record. 1. Have you been to the ER, urgent care clinic since your last visit? No   Hospitalized since your last visit?  no    2. Have you seen or consulted any other health care providers outside of the [address] [place] [address] since your last visit? No Include any pap smears or colon screening. Encouraged pt to discuss pt's wishes with spouse/partner/family and bring them in the next appt to follow thru with the Advanced Directive    @  [address], last 12 mths 7/6/2022   Able to walk? Yes   Fall in past 12 months? 0   Do you feel unsteady? 0   Are you worried about falling 0   Number of falls in past 12 months -   Fall with injury?  -       3 most recent PHQ Screens 7/6/2022   Little interest or pleasure in doing things Not at all   Feeling down, depressed, irritable, or hopeless Not at all   Total Score PHQ 2 0   Trouble falling or staying asleep, or sleeping too much Not at all   Feeling tired or having little energy Not at all   Poor appetite, weight loss, or overeating Not at all   Feeling bad about yourself - or that you are a failure or have let yourself or your family down Not at all   Trouble concentrating on things such as school, work, reading, or watching TV Not at all   Moving or speaking so slowly that other people could have noticed; or the opposite being so fidgety that others notice Not at all   Thoughts of being better off dead, or hurting yourself in some way Not at all   PHQ 9 Score 0   How difficult have these problems made it for you to do your work, take care of your home and get along with others Not difficult at all       Abuse Screening Questionnaire 7/6/2022   Do you ever feel afraid of your partner? N   Are you in a relationship with someone who physically or mentally threatens you? N   Is it safe for you to go home?  Y       ADL Assessment 7/6/2022   Feeding yourself No Help Needed   Getting from bed to chair No Help Needed   Getting dressed No Help Needed   Bathing or showering No Help Needed   Walk across the room (includes cane/walker) No Help Needed   Using the telphone No Help Needed   Taking your medications No Help Needed   Preparing meals No Help Needed   Managing money (expenses/bills) No Help Needed   Moderately strenuous housework (laundry) No Help Needed   Shopping for personal items (toiletries/medicines) No Help Needed   Shopping for groceries No Help Needed   Driving No Help Needed   Climbing a flight of stairs No Help Needed   Getting to places beyond walking distances No Help Needed

## 2022-11-09 NOTE — PATIENT INSTRUCTIONS
Vaccine Information Statement    Influenza (Flu) Vaccine (Inactivated or Recombinant): What You Need to Know    Many vaccine information statements are available in Frisian and other languages. See www.immunize.org/vis. Hojas de información sobre vacunas están disponibles en español y en muchos otros idiomas. Visite www.immunize.org/vis. 1. Why get vaccinated? Influenza vaccine can prevent influenza (flu). Flu is a contagious disease that spreads around the [facility] every year, usually between October and May. Anyone can get the flu, but it is more dangerous for some people. Infants and young children, people 72 years and older, pregnant people, and people with certain health conditions or a weakened immune system are at greatest risk of flu complications. Pneumonia, bronchitis, sinus infections, and ear infections are examples of flu-related complications. If you have a medical condition, such as heart disease, cancer, or diabetes, flu can make it worse. Flu can cause fever and chills, sore throat, muscle aches, fatigue, cough, headache, and runny or stuffy nose. Some people may have vomiting and diarrhea, though this is more common in children than adults. In an average year, thousands of people in the [facility] die from flu, and many more are hospitalized. Flu vaccine prevents millions of illnesses and flu-related visits to the doctor each year. 2. Influenza vaccines     CDC recommends everyone 6 months and older get vaccinated every flu season. Children 6 months through 6years of age may need 2 doses during a single flu season. Everyone else needs only 1 dose each flu season. It takes about 2 weeks for protection to develop after vaccination. There are many flu viruses, and they are always changing. Each year a new flu vaccine is made to protect against the influenza viruses believed to be likely to cause disease in the upcoming flu season.  Even when the vaccine doesnt exactly match these viruses, it may still provide some protection. Influenza vaccine does not cause flu. Influenza vaccine may be given at the same time as other vaccines. 3. Talk with your health care provider    Tell your vaccination provider if the person getting the vaccine:  Has had an allergic reaction after a previous dose of influenza vaccine, or has any severe, life-threatening allergies   Has ever had Guillain-Barré Syndrome (also called GBS)    In some cases, your health care provider may decide to postpone influenza vaccination until a future visit. Influenza vaccine can be administered at any time during pregnancy. People who are or will be pregnant during influenza season should receive inactivated influenza vaccine. People with minor illnesses, such as a cold, may be vaccinated. People who are moderately or severely ill should usually wait until they recover before getting influenza vaccine. Your health care provider can give you more information. 4. Risks of a vaccine reaction    Soreness, redness, and swelling where the shot is given, fever, muscle aches, and headache can happen after influenza vaccination. There may be a very small increased risk of Guillain-Barré Syndrome (GBS) after inactivated influenza vaccine (the flu shot). Jose J Keith children who get the flu shot along with pneumococcal vaccine (PCV13) and/or DTaP vaccine at the same time might be slightly more likely to have a seizure caused by fever. Tell your health care provider if a child who is getting flu vaccine has ever had a seizure. People sometimes faint after medical procedures, including vaccination. Tell your provider if you feel dizzy or have vision changes or ringing in the ears. As with any medicine, there is a very remote chance of a vaccine causing a severe allergic reaction, other serious injury, or death. 5. What if there is a serious problem?     An allergic reaction could occur after the vaccinated person leaves the clinic. If you see signs of a severe allergic reaction (hives, swelling of the face and throat, difficulty breathing, a fast heartbeat, dizziness, or weakness), call 9-1-1 and get the person to the nearest hospital.    For other signs that concern you, call your health care provider. Adverse reactions should be reported to the Vaccine Adverse Event Reporting System (VAERS). Your health care provider will usually file this report, or you can do it yourself. Visit the VAERS website at www.vaers. [facility].gov or call 0-587.344.4597. VAERS is only for reporting reactions, and VAERS staff members do not give medical advice. 6. The National Vaccine Injury Compensation Program    The [facility] Vaccine Injury Compensation Program (VICP) is a federal program that was created to compensate people who may have been injured by certain vaccines. Claims regarding alleged injury or death due to vaccination have a time limit for filing, which may be as short as two years. Visit the VICP website at www.[facility].gov/vaccinecompensation or call 1-344.842.4380 to learn about the program and about filing a claim. 7. How can I learn more? Ask your health care provider. Call your local or state health department. Visit the website of the Food and Drug Administration (FDA) for vaccine package inserts and additional information at www.fda.gov/vaccines-blood-biologics/vaccines. Contact the Centers for Disease Control and Prevention (CDC): Call 6-553.286.6688 (1-800-CDC-INFO) or  Visit CDCs influenza website at www.cdc.gov/flu. Vaccine Information Statement   Inactivated Influenza Vaccine   8/6/2021  [address]ace [address] [id number]   Department of Health and Human Services  Centers for Disease Control and Prevention    Office Use Only

## 2022-11-10 LAB
CREAT UR-MCNC: 95.2 MG/DL
MICROALBUMIN UR-MCNC: 4.55 MG/DL
MICROALBUMIN/CREAT UR-RTO: 48 MG/G (ref 0–30)

## 2023-01-09 ENCOUNTER — OFFICE VISIT (OUTPATIENT)
Dept: INTERNAL MEDICINE CLINIC | Age: 83
End: 2023-01-09
Payer: MEDICARE

## 2023-01-09 VITALS
RESPIRATION RATE: 16 BRPM | OXYGEN SATURATION: 96 % | WEIGHT: 134 LBS | BODY MASS INDEX: 23.74 KG/M2 | SYSTOLIC BLOOD PRESSURE: 124 MMHG | TEMPERATURE: 98.4 F | HEIGHT: 63 IN | DIASTOLIC BLOOD PRESSURE: 72 MMHG | HEART RATE: 100 BPM

## 2023-01-09 DIAGNOSIS — E78.2 MIXED HYPERLIPIDEMIA: ICD-10-CM

## 2023-01-09 DIAGNOSIS — H65.21 RIGHT CHRONIC SEROUS OTITIS MEDIA: ICD-10-CM

## 2023-01-09 DIAGNOSIS — I10 ESSENTIAL HYPERTENSION: ICD-10-CM

## 2023-01-09 DIAGNOSIS — K21.9 GERD WITHOUT ESOPHAGITIS: ICD-10-CM

## 2023-01-09 DIAGNOSIS — M80.00XD AGE-RELATED OSTEOPOROSIS WITH CURRENT PATHOLOGICAL FRACTURE WITH ROUTINE HEALING, SUBSEQUENT ENCOUNTER: ICD-10-CM

## 2023-01-09 DIAGNOSIS — E11.22 TYPE 2 DIABETES MELLITUS WITH CHRONIC KIDNEY DISEASE, WITHOUT LONG-TERM CURRENT USE OF INSULIN, UNSPECIFIED CKD STAGE (HCC): Primary | ICD-10-CM

## 2023-01-09 PROCEDURE — 1090F PRES/ABSN URINE INCON ASSESS: CPT | Performed by: FAMILY MEDICINE

## 2023-01-09 PROCEDURE — G8420 CALC BMI NORM PARAMETERS: HCPCS | Performed by: FAMILY MEDICINE

## 2023-01-09 PROCEDURE — G8432 DEP SCR NOT DOC, RNG: HCPCS | Performed by: FAMILY MEDICINE

## 2023-01-09 PROCEDURE — G8427 DOCREV CUR MEDS BY ELIG CLIN: HCPCS | Performed by: FAMILY MEDICINE

## 2023-01-09 PROCEDURE — G8536 NO DOC ELDER MAL SCRN: HCPCS | Performed by: FAMILY MEDICINE

## 2023-01-09 PROCEDURE — 99214 OFFICE O/P EST MOD 30 MIN: CPT | Performed by: FAMILY MEDICINE

## 2023-01-09 PROCEDURE — 1101F PT FALLS ASSESS-DOCD LE1/YR: CPT | Performed by: FAMILY MEDICINE

## 2023-01-09 RX ORDER — FLUTICASONE PROPIONATE 50 MCG
2 SPRAY, SUSPENSION (ML) NASAL DAILY
Qty: 1 EACH | Refills: 3 | Status: SHIPPED | OUTPATIENT
Start: 2023-01-09

## 2023-01-09 RX ORDER — ALENDRONATE SODIUM 70 MG/1
70 TABLET ORAL
Qty: 12 TABLET | Refills: 3 | Status: SHIPPED | OUTPATIENT
Start: 2023-01-09

## 2023-01-09 RX ORDER — OMEPRAZOLE 20 MG/1
20 CAPSULE, DELAYED RELEASE ORAL DAILY
Qty: 90 CAPSULE | Refills: 3 | Status: SHIPPED | OUTPATIENT
Start: 2023-01-09

## 2023-01-09 NOTE — PROGRESS NOTES
Subjective: Michael Monterroso is a 79 y.o. female who presents for follow up of hypertension. Diet controlled DM    New concerns: ENT working on her hearing, said maybe surgery but it's not that bad  She is inclined not to do it    Diet and Lifestyle: nonsmoker    Cardiovascular ROS:   Reports taking blood pressure medications without side affects. No complaints of exertional chest pain, excessive shortness of breath or focal weakness. Minimal swelling in lower legs or dizziness with standing. Review of Systems, additional:  Pertinent items are noted in HPI. Patient Active Problem List    Diagnosis Date Noted    Type 2 diabetes mellitus with chronic kidney disease ([facility] [address] 07/06/2022    Chronic renal disease, stage III 07/06/2022    Type 2 diabetes mellitus 11/01/2021    Elevated blood sugar 10/07/2020    Hip fracture requiring operative repair, left, closed, with routine healing, subsequent encounter 06/11/2019    Irritable bladder 06/11/2019    Insomnia due to stress 06/11/2019    GERD (gastroesophageal reflux disease) 06/11/2019    Diverticulosis 05/08/2014    Age related osteoporosis 12/13/2012    Essential hypertension 05/11/2010    Hyperlipidemia 05/11/2010     Current Outpatient Medications   Medication Sig Dispense Refill    fluticasone propionate (FLONASE) 50 mcg/actuation nasal spray 2 Sprays by Both Nostrils route daily. 1 Each 3    omeprazole (PRILOSEC) 20 mg capsule Take 1 Capsule by mouth daily. 90 Capsule 3    alendronate (FOSAMAX) 70 mg tablet Take 1 Tablet by mouth every seven (7) days. 12 Tablet 3    oxybutynin (DITROPAN) 5 mg tablet Take 1 Tablet by mouth nightly. 90 Tablet 1    busPIRone (BUSPAR) 5 mg tablet Take 1 Tablet by mouth two (2) times a day. 180 Tablet 1    amLODIPine (NORVASC) 5 mg tablet TAKE 1 TABLET EVERY DAY 90 Tablet 1    lisinopriL (PRINIVIL, ZESTRIL) 20 mg tablet Take 1 Tablet by mouth daily.  90 Tablet 1    Calcium-Cholecalciferol, D3, 500 mg(1,250mg) -400 unit tab Take  by mouth.        Allergies   Allergen Reactions    Nsaids (Non-Steroidal Anti-Inflammatory Drug) Rash    Tolmetin Rash    Naprosyn [Naproxen] Nausea Only     Past Medical History:   Diagnosis Date    Arthritis     Cancer ([facility] [address]     scalp skin CA    Carotid stenosis, bilateral 2011    <50%    Chronic sinusitis     Diverticulosis     GERD (gastroesophageal reflux disease)     Hyperlipidemia 2010    Hypertension     Insomnia due to stress     Irritable bladder     Osteoporosis     Prediabetes     Psychiatric disorder     anxiety    Shingles      Social History     Tobacco Use    Smoking status: Former     Packs/day: 1.00     Years: 0.00     Pack years: 0.00     Types: Cigarettes     Quit date: 1990     Years since quittin.6    Smokeless tobacco: Never   Substance Use Topics    Alcohol use: No     Alcohol/week: 0.0 standard drinks        Lab Results   Component Value Date/Time    Hemoglobin A1c 7.2 (H) 2022 09:22 AM    Hemoglobin A1c 6.4 (H) 2022 11:07 AM    Hemoglobin A1c 6.7 (H) 10/25/2021 08:37 AM    Glucose 119 (H) 2022 09:22 AM    Glucose  2017 01:54 PM    Microalbumin/Creat ratio (mg/g creat) 48 (H) 2022 12:00 PM    Microalbumin,urine random 4.55 2022 12:00 PM    LDL, calculated 179 (H) 10/25/2021 08:37 AM    LDL, calculated 137 (H) 07/10/2020 08:32 AM    Creatinine 1.39 (H) 2022 09:22 AM      Lab Results   Component Value Date/Time    Cholesterol, total 254 (H) 10/25/2021 08:37 AM    HDL Cholesterol 40 10/25/2021 08:37 AM    LDL, calculated 179 (H) 10/25/2021 08:37 AM    LDL, calculated 137 (H) 07/10/2020 08:32 AM    Triglyceride 188 (H) 10/25/2021 08:37 AM     Lab Results   Component Value Date/Time    GFR est non-AA 30 (L) 2022 11:07 AM    GFR est AA 35 (L) 2022 11:07 AM    Creatinine 1.39 (H) 2022 09:22 AM    BUN 19 2022 09:22 AM    Sodium 139 2022 09:22 AM    Potassium 3.8 2022 09:22 AM    Chloride 100 07/06/2022 09:22 AM    CO2 23 07/06/2022 09:22 AM     Lab Results   Component Value Date/Time    Glucose 119 (H) 07/06/2022 09:22 AM    Glucose  04/04/2017 01:54 PM             Objective:     Physical exam significant for the following: WNL  Visit Vitals  /72 (BP 1 Location: Left arm, BP Patient Position: Sitting, BP Cuff Size: Adult)   Pulse 100   Temp 98.4 °F (36.9 °C) (Temporal)   Resp 16   Ht 5' 3\" (1.6 m)   Wt 134 lb (60.8 kg)   SpO2 96%   BMI 23.74 kg/m²     WD WN female NAD    . Assessment/Plan:     hypertension well controlled, stable, hyperlipidemia poorly controlled, needs improvement. Dm stable  Do diet then re-check      ICD-10-CM ICD-9-CM    1. Type 2 diabetes mellitus with chronic kidney disease, without long-term current use of insulin, unspecified CKD stage (HCC)  E11.22 250.40 REFERRAL TO OPTOMETRY     585.9 HEMOGLOBIN A1C WITH EAG      2. Essential hypertension  I91 375.1 METABOLIC PANEL, BASIC      3. Mixed hyperlipidemia  E78.2 272.2 LIPID PANEL      4. Right chronic serous otitis media  H65.21 381.10 fluticasone propionate (FLONASE) 50 mcg/actuation nasal spray      5. GERD without esophagitis  K21.9 530.81 omeprazole (PRILOSEC) 20 mg capsule      6.  Age-related osteoporosis with current pathological fracture with routine healing, subsequent encounter  M80.00XD [id number] alendronate (FOSAMAX) 70 mg tablet          Orders Placed This Encounter    LIPID PANEL     Standing Status:   Future     Standing Expiration Date:   1/36/5424    METABOLIC PANEL, BASIC     Standing Status:   Future     Standing Expiration Date:   7/12/2023    HEMOGLOBIN A1C WITH EAG     Standing Status:   Future     Standing Expiration Date:   1/9/2024    REFERRAL TO OPTOMETRY     Referral Priority:   Routine     Referral Type:   Consultation     Referral Reason:   Specialty Services Required     Referred to Provider:   Kenny Limon OD    fluticasone propionate (FLONASE) 50 mcg/actuation nasal spray Si Sprays by Both Nostrils route daily. Dispense:  1 Each     Refill:  3    omeprazole (PRILOSEC) 20 mg capsule     Sig: Take 1 Capsule by mouth daily. Dispense:  90 Capsule     Refill:  3    alendronate (FOSAMAX) 70 mg tablet     Sig: Take 1 Tablet by mouth every seven (7) days. Dispense:  12 Tablet     Refill:  3       Discussed possible side affects, precautions, and drug interactions and possible benefits of the medication(s). Follow-up and Dispositions    Return in about 3 months (around 2023).

## 2023-01-11 LAB
BUN SERPL-MCNC: 16 MG/DL (ref 8–27)
BUN/CREAT SERPL: 13 (ref 12–28)
CALCIUM SERPL-MCNC: 9.8 MG/DL (ref 8.7–10.3)
CHLORIDE SERPL-SCNC: 101 MMOL/L (ref 96–106)
CHOLEST SERPL-MCNC: 246 MG/DL (ref 100–199)
CO2 SERPL-SCNC: 23 MMOL/L (ref 20–29)
CREAT SERPL-MCNC: 1.28 MG/DL (ref 0.57–1)
EGFR: 42 ML/MIN/1.73
EST. AVERAGE GLUCOSE BLD GHB EST-MCNC: 163 MG/DL
GLUCOSE SERPL-MCNC: 110 MG/DL (ref 70–99)
HBA1C MFR BLD: 7.3 % (ref 4.8–5.6)
HDLC SERPL-MCNC: 34 MG/DL
IMP & REVIEW OF LAB RESULTS: NORMAL
INTERPRETATION: NORMAL
LDLC SERPL CALC-MCNC: 159 MG/DL (ref 0–99)
POTASSIUM SERPL-SCNC: 4.2 MMOL/L (ref 3.5–5.2)
SODIUM SERPL-SCNC: 140 MMOL/L (ref 134–144)
TRIGL SERPL-MCNC: 284 MG/DL (ref 0–149)
VLDLC SERPL CALC-MCNC: 53 MG/DL (ref 5–40)

## 2023-02-14 DIAGNOSIS — I10 ESSENTIAL HYPERTENSION: ICD-10-CM

## 2023-02-14 DIAGNOSIS — F51.02 INSOMNIA DUE TO PSYCHOLOGICAL STRESS: ICD-10-CM

## 2023-02-14 DIAGNOSIS — E11.65 TYPE 2 DIABETES MELLITUS WITH HYPERGLYCEMIA, WITHOUT LONG-TERM CURRENT USE OF INSULIN (HCC): ICD-10-CM

## 2023-02-14 RX ORDER — AMLODIPINE BESYLATE 5 MG/1
TABLET ORAL
Qty: 90 TABLET | Refills: 1 | Status: SHIPPED | OUTPATIENT
Start: 2023-02-14

## 2023-02-14 RX ORDER — BUSPIRONE HYDROCHLORIDE 5 MG/1
TABLET ORAL
Qty: 180 TABLET | Refills: 1 | Status: SHIPPED | OUTPATIENT
Start: 2023-02-14

## 2023-02-14 RX ORDER — OXYBUTYNIN CHLORIDE 5 MG/1
TABLET ORAL
Qty: 90 TABLET | Refills: 1 | Status: SHIPPED | OUTPATIENT
Start: 2023-02-14

## 2023-02-16 DIAGNOSIS — H65.21 RIGHT CHRONIC SEROUS OTITIS MEDIA: ICD-10-CM

## 2023-02-16 DIAGNOSIS — M80.00XD AGE-RELATED OSTEOPOROSIS WITH CURRENT PATHOLOGICAL FRACTURE WITH ROUTINE HEALING, SUBSEQUENT ENCOUNTER: ICD-10-CM

## 2023-02-16 RX ORDER — FLUTICASONE PROPIONATE 50 MCG
2 SPRAY, SUSPENSION (ML) NASAL DAILY
Qty: 1 EACH | Refills: 3 | Status: SHIPPED | OUTPATIENT
Start: 2023-02-16

## 2023-02-16 RX ORDER — ALENDRONATE SODIUM 70 MG/1
70 TABLET ORAL
Qty: 12 TABLET | Refills: 3 | Status: SHIPPED | OUTPATIENT
Start: 2023-02-16

## 2023-03-01 DIAGNOSIS — G47.00 INSOMNIA, UNSPECIFIED TYPE: Primary | ICD-10-CM

## 2023-03-01 RX ORDER — TRAZODONE HYDROCHLORIDE 50 MG/1
50 TABLET ORAL
Qty: 90 TABLET | Refills: 0 | Status: SHIPPED | OUTPATIENT
Start: 2023-03-01

## 2023-05-09 RX ORDER — FLUTICASONE PROPIONATE 50 MCG
SPRAY, SUSPENSION (ML) NASAL
Qty: 48 G | Refills: 5 | Status: SHIPPED | OUTPATIENT
Start: 2023-05-09

## 2023-05-09 RX ORDER — TRAZODONE HYDROCHLORIDE 50 MG/1
TABLET ORAL
Qty: 90 TABLET | Refills: 1 | Status: SHIPPED | OUTPATIENT
Start: 2023-05-09

## 2023-05-09 RX ORDER — OMEPRAZOLE 20 MG/1
CAPSULE, DELAYED RELEASE ORAL
Qty: 90 CAPSULE | Refills: 1 | Status: SHIPPED | OUTPATIENT
Start: 2023-05-09

## 2023-05-23 RX ORDER — CALCIUM CARBONATE/VITAMIN D3 500 MG-10
TABLET ORAL
COMMUNITY
End: 2023-07-11 | Stop reason: SDUPTHER

## 2023-05-23 RX ORDER — BUSPIRONE HYDROCHLORIDE 5 MG/1
5 TABLET ORAL 2 TIMES DAILY
COMMUNITY
Start: 2023-04-10 | End: 2023-07-11 | Stop reason: SDUPTHER

## 2023-05-23 RX ORDER — AMLODIPINE BESYLATE 5 MG/1
5 TABLET ORAL DAILY
COMMUNITY
Start: 2023-04-10 | End: 2023-07-11 | Stop reason: SDUPTHER

## 2023-05-23 RX ORDER — LISINOPRIL 20 MG/1
20 TABLET ORAL DAILY
COMMUNITY
Start: 2023-04-10 | End: 2023-07-11 | Stop reason: SDUPTHER

## 2023-05-23 RX ORDER — OXYBUTYNIN CHLORIDE 5 MG/1
1 TABLET ORAL NIGHTLY
COMMUNITY
Start: 2023-04-10 | End: 2023-07-11 | Stop reason: SDUPTHER

## 2023-07-10 PROBLEM — N18.4 CHRONIC KIDNEY DISEASE, STAGE 4 (SEVERE) (HCC): Status: ACTIVE | Noted: 2023-07-10

## 2023-07-10 NOTE — PROGRESS NOTES
Medicare Wellness Exam:    she is a 79y.o. year old female who presents for evaluation for their Medicare Wellness Visit. Patient Active Problem List   Diagnosis    Insomnia due to stress    Irritable bladder    Essential hypertension    Hip fracture requiring operative repair, left, closed, with routine healing, subsequent encounter    Age related osteoporosis    Type 2 diabetes mellitus ([address]    Hyperlipidemia    Diverticulosis    GERD (gastroesophageal reflux disease)    Elevated blood sugar    Type 2 diabetes mellitus with chronic kidney disease (HCC)    Chronic renal disease, stage III (HCC)    Chronic kidney disease, stage 4 (severe) ([address])       New concerns: Asks if she can do anything about these sun spots on her arms  Told her she could come back and I can freeze them    Orquidea Blank is completed and assessed=yes, see nurse note  Medication list reviewed and adjusted for accuracy=yes  Immunizations reviewed and updated=yes  Health/Preventative Screenings reviewed and updated=yes  ADL Functions reviewed=yes      Reviewed PmHx, RxHx, FmHx, SocHx, AllgHx and updated and dated in the chart. Depression Risk Factor Screening      See nurse note  Phq2= 0    Alcohol & Drug Abuse Risk Screen    Do you average more than 1 drink per night or more than 7 drinks a week: .no     On any one occasion in the past three months have you have had more than 3 drinks containing alcohol: .no           Functional Ability and Level of Safety     Hearing: Hearing is good. .yes     Activities of Daily Living: The home contains safety equiptment: .yes  Patient does total self care: .yes  Ambulation: with no difficulty. .yes     Fall Risk:  See nurse note    Abuse Screen:  Patient is not abused. yes        Cognitive Screening    Has your family/caregiver stated any concerns about your memory: .no     Cognitive Screening: Normal - Clock Drawing Test .yes    Health Maintenance Due      Health Maintenance reviewed -routine labs

## 2023-07-11 ENCOUNTER — OFFICE VISIT (OUTPATIENT)
Facility: CLINIC | Age: 83
End: 2023-07-11
Payer: MEDICARE

## 2023-07-11 VITALS
HEART RATE: 74 BPM | WEIGHT: 134 LBS | OXYGEN SATURATION: 96 % | HEIGHT: 63 IN | DIASTOLIC BLOOD PRESSURE: 68 MMHG | TEMPERATURE: 98.6 F | BODY MASS INDEX: 23.74 KG/M2 | SYSTOLIC BLOOD PRESSURE: 138 MMHG

## 2023-07-11 DIAGNOSIS — F41.9 ANXIETY: ICD-10-CM

## 2023-07-11 DIAGNOSIS — N18.4 CHRONIC KIDNEY DISEASE, STAGE 4 (SEVERE) (HCC): ICD-10-CM

## 2023-07-11 DIAGNOSIS — N31.8 BLADDER HYPERTONICITY: ICD-10-CM

## 2023-07-11 DIAGNOSIS — N18.2 TYPE 2 DIABETES MELLITUS WITH STAGE 2 CHRONIC KIDNEY DISEASE, WITHOUT LONG-TERM CURRENT USE OF INSULIN (HCC): ICD-10-CM

## 2023-07-11 DIAGNOSIS — E11.22 TYPE 2 DIABETES MELLITUS WITH STAGE 2 CHRONIC KIDNEY DISEASE, WITHOUT LONG-TERM CURRENT USE OF INSULIN (HCC): ICD-10-CM

## 2023-07-11 DIAGNOSIS — I10 ESSENTIAL HYPERTENSION: ICD-10-CM

## 2023-07-11 DIAGNOSIS — E78.2 MIXED HYPERLIPIDEMIA: ICD-10-CM

## 2023-07-11 DIAGNOSIS — Z00.00 MEDICARE ANNUAL WELLNESS VISIT, SUBSEQUENT: Primary | ICD-10-CM

## 2023-07-11 DIAGNOSIS — K21.9 GASTROESOPHAGEAL REFLUX DISEASE, UNSPECIFIED WHETHER ESOPHAGITIS PRESENT: ICD-10-CM

## 2023-07-11 DIAGNOSIS — L81.4 SOLAR LENTIGO: ICD-10-CM

## 2023-07-11 PROBLEM — R73.9 ELEVATED BLOOD SUGAR: Status: RESOLVED | Noted: 2020-10-07 | Resolved: 2023-07-11

## 2023-07-11 PROCEDURE — G8399 PT W/DXA RESULTS DOCUMENT: HCPCS | Performed by: FAMILY MEDICINE

## 2023-07-11 PROCEDURE — 3078F DIAST BP <80 MM HG: CPT | Performed by: FAMILY MEDICINE

## 2023-07-11 PROCEDURE — 1123F ACP DISCUSS/DSCN MKR DOCD: CPT | Performed by: FAMILY MEDICINE

## 2023-07-11 PROCEDURE — 1036F TOBACCO NON-USER: CPT | Performed by: FAMILY MEDICINE

## 2023-07-11 PROCEDURE — G8420 CALC BMI NORM PARAMETERS: HCPCS | Performed by: FAMILY MEDICINE

## 2023-07-11 PROCEDURE — 1090F PRES/ABSN URINE INCON ASSESS: CPT | Performed by: FAMILY MEDICINE

## 2023-07-11 PROCEDURE — G8427 DOCREV CUR MEDS BY ELIG CLIN: HCPCS | Performed by: FAMILY MEDICINE

## 2023-07-11 PROCEDURE — 99214 OFFICE O/P EST MOD 30 MIN: CPT | Performed by: FAMILY MEDICINE

## 2023-07-11 PROCEDURE — 3051F HG A1C>EQUAL 7.0%<8.0%: CPT | Performed by: FAMILY MEDICINE

## 2023-07-11 PROCEDURE — 3074F SYST BP LT 130 MM HG: CPT | Performed by: FAMILY MEDICINE

## 2023-07-11 PROCEDURE — G0439 PPPS, SUBSEQ VISIT: HCPCS | Performed by: FAMILY MEDICINE

## 2023-07-11 RX ORDER — OXYBUTYNIN CHLORIDE 5 MG/1
5 TABLET ORAL
Qty: 90 TABLET | Refills: 3 | Status: SHIPPED | OUTPATIENT
Start: 2023-07-11

## 2023-07-11 RX ORDER — BUSPIRONE HYDROCHLORIDE 5 MG/1
5 TABLET ORAL 2 TIMES DAILY
Qty: 180 TABLET | Refills: 3 | Status: SHIPPED | OUTPATIENT
Start: 2023-07-11

## 2023-07-11 RX ORDER — AMLODIPINE BESYLATE 10 MG/1
5 TABLET ORAL DAILY
Qty: 90 TABLET | Refills: 3 | Status: SHIPPED | OUTPATIENT
Start: 2023-07-11 | End: 2023-07-11 | Stop reason: SDUPTHER

## 2023-07-11 RX ORDER — CALCIUM CARBONATE/VITAMIN D3 500 MG-10
1 TABLET ORAL DAILY
Qty: 90 TABLET | Refills: 3 | Status: SHIPPED | OUTPATIENT
Start: 2023-07-11

## 2023-07-11 RX ORDER — AMLODIPINE BESYLATE 10 MG/1
10 TABLET ORAL DAILY
Qty: 90 TABLET | Refills: 3 | Status: SHIPPED | OUTPATIENT
Start: 2023-07-11

## 2023-07-11 RX ORDER — LISINOPRIL 20 MG/1
20 TABLET ORAL DAILY
Qty: 90 TABLET | Refills: 3 | Status: SHIPPED | OUTPATIENT
Start: 2023-07-11

## 2023-07-11 SDOH — ECONOMIC STABILITY: HOUSING INSECURITY
IN THE LAST 12 MONTHS, WAS THERE A TIME WHEN YOU DID NOT HAVE A STEADY PLACE TO SLEEP OR SLEPT IN A SHELTER (INCLUDING NOW)?: NO

## 2023-07-11 SDOH — ECONOMIC STABILITY: INCOME INSECURITY: HOW HARD IS IT FOR YOU TO PAY FOR THE VERY BASICS LIKE FOOD, HOUSING, MEDICAL CARE, AND HEATING?: NOT HARD AT ALL

## 2023-07-11 SDOH — ECONOMIC STABILITY: FOOD INSECURITY: WITHIN THE PAST 12 MONTHS, YOU WORRIED THAT YOUR FOOD WOULD RUN OUT BEFORE YOU GOT MONEY TO BUY MORE.: NEVER TRUE

## 2023-07-11 SDOH — ECONOMIC STABILITY: FOOD INSECURITY: WITHIN THE PAST 12 MONTHS, THE FOOD YOU BOUGHT JUST DIDN'T LAST AND YOU DIDN'T HAVE MONEY TO GET MORE.: NEVER TRUE

## 2023-07-11 ASSESSMENT — PATIENT HEALTH QUESTIONNAIRE - PHQ9
SUM OF ALL RESPONSES TO PHQ9 QUESTIONS 1 & 2: 2
SUM OF ALL RESPONSES TO PHQ QUESTIONS 1-9: 2
SUM OF ALL RESPONSES TO PHQ QUESTIONS 1-9: 2
2. FEELING DOWN, DEPRESSED OR HOPELESS: 1
SUM OF ALL RESPONSES TO PHQ QUESTIONS 1-9: 2
SUM OF ALL RESPONSES TO PHQ QUESTIONS 1-9: 2
1. LITTLE INTEREST OR PLEASURE IN DOING THINGS: 1

## 2023-07-11 ASSESSMENT — ANXIETY QUESTIONNAIRES
5. BEING SO RESTLESS THAT IT IS HARD TO SIT STILL: 1
6. BECOMING EASILY ANNOYED OR IRRITABLE: 1
IF YOU CHECKED OFF ANY PROBLEMS ON THIS QUESTIONNAIRE, HOW DIFFICULT HAVE THESE PROBLEMS MADE IT FOR YOU TO DO YOUR WORK, TAKE CARE OF THINGS AT HOME, OR GET ALONG WITH OTHER PEOPLE: NOT DIFFICULT AT ALL
4. TROUBLE RELAXING: 1
2. NOT BEING ABLE TO STOP OR CONTROL WORRYING: 1
7. FEELING AFRAID AS IF SOMETHING AWFUL MIGHT HAPPEN: 1
3. WORRYING TOO MUCH ABOUT DIFFERENT THINGS: 1
GAD7 TOTAL SCORE: 7
1. FEELING NERVOUS, ANXIOUS, OR ON EDGE: 1

## 2023-07-11 NOTE — PROGRESS NOTES
Chief Complaint   Patient presents with    Medicare AWV     Clock Test was done      Patient has not been out of the country in (14 months), NO diarrhea, NO cough, NO chest conjestion, NO temp. Pt has not been around anyone with these symptoms. Health Maintenance reviewed. I have reviewed the patient's medical history in detail and updated the computerized patient record. 1. \"Have you been to the ER, urgent care clinic since your last visit? No Hospitalized since your last visit? \" no    2. \"Have you seen or consulted any other health care providers outside of the [address] [place] [address] since your last visit? \" no     3. For patients aged 43-73: Has the patient had a colonoscopy / FIT/ Cologuard?  no

## 2023-07-12 LAB
BUN SERPL-MCNC: 24 MG/DL (ref 8–27)
BUN/CREAT SERPL: 17 (ref 12–28)
CALCIUM SERPL-MCNC: 9.8 MG/DL (ref 8.7–10.3)
CHLORIDE SERPL-SCNC: 102 MMOL/L (ref 96–106)
CO2 SERPL-SCNC: 22 MMOL/L (ref 20–29)
CREAT SERPL-MCNC: 1.45 MG/DL (ref 0.57–1)
EGFRCR SERPLBLD CKD-EPI 2021: 36 ML/MIN/1.73
GLUCOSE SERPL-MCNC: 123 MG/DL (ref 70–99)
HBA1C MFR BLD: 7 % (ref 4.8–5.6)
POTASSIUM SERPL-SCNC: 4.6 MMOL/L (ref 3.5–5.2)
REPORT: NORMAL
SODIUM SERPL-SCNC: 139 MMOL/L (ref 134–144)

## 2023-08-06 NOTE — PROGRESS NOTES
Subjective: Mynor Ledesma is a 79 y.o. female who presents for follow up of   Patient Active Problem List   Diagnosis    Insomnia due to stress    Irritable bladder    Essential hypertension    Hip fracture requiring operative repair, left, closed, with routine healing, subsequent encounter    Age related osteoporosis    Type 2 diabetes mellitus ([address])    Hyperlipidemia    Diverticulosis    GERD (gastroesophageal reflux disease)    Type 2 diabetes mellitus with chronic kidney disease ([address])    Chronic renal disease, stage III ([address])       New concerns: rash on er arms been to see derm did light Rx but it did not help        Cardiac ROS:   Reports taking cardiovascuar medications without side affects. No complaints of exertional chest pain, excessive shortness of breath or focal weakness. Minimal swelling in lower legs or dizziness with standing. Review of Systems, additional:  Pertinent items are noted in HPI.       Patient Active Problem List    Diagnosis Date Noted    Type 2 diabetes mellitus with chronic kidney disease ([address]) 07/06/2022    Chronic renal disease, stage III ([address]) 07/06/2022    Type 2 diabetes mellitus ([address]) 11/01/2021    Insomnia due to stress 06/11/2019    Irritable bladder 06/11/2019    Hip fracture requiring operative repair, left, closed, with routine healing, subsequent encounter 06/11/2019    GERD (gastroesophageal reflux disease) 06/11/2019    Diverticulosis 05/08/2014    Age related osteoporosis 12/13/2012    Essential hypertension 05/11/2010    Hyperlipidemia 05/11/2010     Allergies   Allergen Reactions    Nsaids Rash    Tolmetin Rash    Naproxen Nausea Only     Past Medical History:   Diagnosis Date    Arthritis     Cancer ([address])     scalp skin CA    Carotid stenosis, bilateral 2011    <50%    Chronic sinusitis     Diverticulosis     GERD (gastroesophageal reflux disease)     Hyperlipidemia 5/11/2010    Hypertension     Insomnia due to stress     Irritable bladder

## 2023-08-08 ENCOUNTER — OFFICE VISIT (OUTPATIENT)
Facility: CLINIC | Age: 83
End: 2023-08-08
Payer: MEDICARE

## 2023-08-08 VITALS
HEIGHT: 63 IN | OXYGEN SATURATION: 96 % | DIASTOLIC BLOOD PRESSURE: 74 MMHG | HEART RATE: 89 BPM | WEIGHT: 137 LBS | RESPIRATION RATE: 16 BRPM | TEMPERATURE: 98.6 F | BODY MASS INDEX: 24.27 KG/M2 | SYSTOLIC BLOOD PRESSURE: 139 MMHG

## 2023-08-08 DIAGNOSIS — N18.30 STAGE 3 CHRONIC KIDNEY DISEASE, UNSPECIFIED WHETHER STAGE 3A OR 3B CKD (HCC): ICD-10-CM

## 2023-08-08 DIAGNOSIS — I10 ESSENTIAL HYPERTENSION: ICD-10-CM

## 2023-08-08 DIAGNOSIS — S72.002D HIP FRACTURE REQUIRING OPERATIVE REPAIR, LEFT, CLOSED, WITH ROUTINE HEALING, SUBSEQUENT ENCOUNTER: ICD-10-CM

## 2023-08-08 DIAGNOSIS — N31.8 BLADDER HYPERTONICITY: ICD-10-CM

## 2023-08-08 DIAGNOSIS — E11.22 TYPE 2 DIABETES MELLITUS WITH CHRONIC KIDNEY DISEASE, WITHOUT LONG-TERM CURRENT USE OF INSULIN, UNSPECIFIED CKD STAGE (HCC): Primary | ICD-10-CM

## 2023-08-08 DIAGNOSIS — L81.4 SOLAR LENTIGO: ICD-10-CM

## 2023-08-08 PROCEDURE — G8399 PT W/DXA RESULTS DOCUMENT: HCPCS | Performed by: FAMILY MEDICINE

## 2023-08-08 PROCEDURE — G8427 DOCREV CUR MEDS BY ELIG CLIN: HCPCS | Performed by: FAMILY MEDICINE

## 2023-08-08 PROCEDURE — 3051F HG A1C>EQUAL 7.0%<8.0%: CPT | Performed by: FAMILY MEDICINE

## 2023-08-08 PROCEDURE — 1090F PRES/ABSN URINE INCON ASSESS: CPT | Performed by: FAMILY MEDICINE

## 2023-08-08 PROCEDURE — 3078F DIAST BP <80 MM HG: CPT | Performed by: FAMILY MEDICINE

## 2023-08-08 PROCEDURE — 1036F TOBACCO NON-USER: CPT | Performed by: FAMILY MEDICINE

## 2023-08-08 PROCEDURE — 99214 OFFICE O/P EST MOD 30 MIN: CPT | Performed by: FAMILY MEDICINE

## 2023-08-08 PROCEDURE — 3074F SYST BP LT 130 MM HG: CPT | Performed by: FAMILY MEDICINE

## 2023-08-08 PROCEDURE — 1123F ACP DISCUSS/DSCN MKR DOCD: CPT | Performed by: FAMILY MEDICINE

## 2023-08-08 PROCEDURE — G8420 CALC BMI NORM PARAMETERS: HCPCS | Performed by: FAMILY MEDICINE

## 2023-08-08 RX ORDER — OXYBUTYNIN CHLORIDE 5 MG/1
5 TABLET ORAL
Qty: 90 TABLET | Refills: 3 | Status: SHIPPED | OUTPATIENT
Start: 2023-08-08

## 2023-08-08 RX ORDER — TRAZODONE HYDROCHLORIDE 50 MG/1
50 TABLET ORAL NIGHTLY
Qty: 90 TABLET | Refills: 3 | Status: SHIPPED | OUTPATIENT
Start: 2023-08-08

## 2023-08-08 RX ORDER — HYDROQUINONE 40 MG/G
CREAM TOPICAL
Qty: 28.35 G | Refills: 5 | Status: SHIPPED | OUTPATIENT
Start: 2023-08-08

## 2023-08-08 RX ORDER — OMEPRAZOLE 20 MG/1
20 CAPSULE, DELAYED RELEASE ORAL DAILY
Qty: 90 CAPSULE | Refills: 3 | Status: SHIPPED | OUTPATIENT
Start: 2023-08-08

## 2023-08-08 ASSESSMENT — PATIENT HEALTH QUESTIONNAIRE - PHQ9
SUM OF ALL RESPONSES TO PHQ QUESTIONS 1-9: 2
SUM OF ALL RESPONSES TO PHQ QUESTIONS 1-9: 2
2. FEELING DOWN, DEPRESSED OR HOPELESS: 1
SUM OF ALL RESPONSES TO PHQ9 QUESTIONS 1 & 2: 2
SUM OF ALL RESPONSES TO PHQ QUESTIONS 1-9: 2
SUM OF ALL RESPONSES TO PHQ QUESTIONS 1-9: 2
1. LITTLE INTEREST OR PLEASURE IN DOING THINGS: 1

## 2023-08-08 NOTE — PROGRESS NOTES
Chief Complaint   Patient presents with    Mass     L/fore arm     Patient has not been out of the country in (14 months), NO diarrhea, NO cough, NO chest conjestion, NO temp. Pt has not been around anyone with these symptoms. Health Maintenance reviewed. I have reviewed the patient's medical history in detail and updated the computerized patient record. 1. \"Have you been to the ER, urgent care clinic since your last visit? no  Hospitalized since your last visit? \" no    2. \"Have you seen or consulted any other health care providers outside of the [address] [place] [address] since your last visit? \" no     3. For patients aged 43-73: Has the patient had a colonoscopy / FIT/ Cologuard?  no

## 2023-10-22 NOTE — PROGRESS NOTES
Subjective: Mynor Ledesma is a 79 y.o. female who presents for follow up of   Patient Active Problem List   Diagnosis    Insomnia due to stress    Irritable bladder    Essential hypertension    Hip fracture requiring operative repair, left, closed, with routine healing, subsequent encounter    Age related osteoporosis    Type 2 diabetes mellitus ([address])    Hyperlipidemia    Diverticulosis    GERD (gastroesophageal reflux disease)    Type 2 diabetes mellitus with chronic kidney disease ([address])    Chronic renal disease, stage III ([address])       New concerns: feels like the rash is lighter, using cream. No injury has arthritis        Cardiac ROS:   Reports taking cardiovascuar medications without side affects. No complaints of exertional chest pain, excessive shortness of breath or focal weakness. Minimal swelling in lower legs or dizziness with standing. Review of Systems, additional:  Pertinent items are noted in HPI. Patient Active Problem List    Diagnosis Date Noted    Type 2 diabetes mellitus with chronic kidney disease ([address]) 07/06/2022    Chronic renal disease, stage III ([address]) 07/06/2022    Type 2 diabetes mellitus ([address]) 11/01/2021    Insomnia due to stress 06/11/2019    Irritable bladder 06/11/2019    Hip fracture requiring operative repair, left, closed, with routine healing, subsequent encounter 06/11/2019    GERD (gastroesophageal reflux disease) 06/11/2019    Diverticulosis 05/08/2014    Age related osteoporosis 12/13/2012    Essential hypertension 05/11/2010    Hyperlipidemia 05/11/2010     Current Outpatient Medications   Medication Sig Dispense Refill    hydroquinone 4 % cream Apply topically 2 times daily.  28.35 g 5    traZODone (DESYREL) 50 MG tablet Take 1 tablet by mouth nightly 90 tablet 3    oxybutynin (DITROPAN) 5 MG tablet Take 1 tablet by mouth nightly 90 tablet 3    omeprazole (PRILOSEC) 20 MG delayed release capsule Take 1 capsule by mouth daily 90 capsule 3    busPIRone (BUSPAR)

## 2023-10-23 ENCOUNTER — OFFICE VISIT (OUTPATIENT)
Facility: CLINIC | Age: 83
End: 2023-10-23
Payer: MEDICARE

## 2023-10-23 VITALS
OXYGEN SATURATION: 96 % | DIASTOLIC BLOOD PRESSURE: 70 MMHG | BODY MASS INDEX: 24.98 KG/M2 | HEIGHT: 63 IN | SYSTOLIC BLOOD PRESSURE: 139 MMHG | RESPIRATION RATE: 16 BRPM | HEART RATE: 95 BPM | WEIGHT: 141 LBS | TEMPERATURE: 98.5 F

## 2023-10-23 DIAGNOSIS — N18.30 STAGE 3 CHRONIC KIDNEY DISEASE, UNSPECIFIED WHETHER STAGE 3A OR 3B CKD (HCC): ICD-10-CM

## 2023-10-23 DIAGNOSIS — S72.002D HIP FRACTURE REQUIRING OPERATIVE REPAIR, LEFT, CLOSED, WITH ROUTINE HEALING, SUBSEQUENT ENCOUNTER: ICD-10-CM

## 2023-10-23 DIAGNOSIS — Z23 NEEDS FLU SHOT: ICD-10-CM

## 2023-10-23 DIAGNOSIS — L81.4 SOLAR LENTIGO: ICD-10-CM

## 2023-10-23 DIAGNOSIS — E78.2 MIXED HYPERLIPIDEMIA: ICD-10-CM

## 2023-10-23 DIAGNOSIS — E11.22 TYPE 2 DIABETES MELLITUS WITH CHRONIC KIDNEY DISEASE, WITHOUT LONG-TERM CURRENT USE OF INSULIN, UNSPECIFIED CKD STAGE (HCC): Primary | ICD-10-CM

## 2023-10-23 DIAGNOSIS — I10 ESSENTIAL HYPERTENSION: ICD-10-CM

## 2023-10-23 PROBLEM — E11.9 TYPE 2 DIABETES MELLITUS (HCC): Status: RESOLVED | Noted: 2021-11-01 | Resolved: 2023-10-23

## 2023-10-23 PROBLEM — F51.02 INSOMNIA DUE TO STRESS: Status: RESOLVED | Noted: 2019-06-11 | Resolved: 2023-10-23

## 2023-10-23 PROCEDURE — G8420 CALC BMI NORM PARAMETERS: HCPCS | Performed by: FAMILY MEDICINE

## 2023-10-23 PROCEDURE — 3051F HG A1C>EQUAL 7.0%<8.0%: CPT | Performed by: FAMILY MEDICINE

## 2023-10-23 PROCEDURE — 3074F SYST BP LT 130 MM HG: CPT | Performed by: FAMILY MEDICINE

## 2023-10-23 PROCEDURE — G8484 FLU IMMUNIZE NO ADMIN: HCPCS | Performed by: FAMILY MEDICINE

## 2023-10-23 PROCEDURE — 1090F PRES/ABSN URINE INCON ASSESS: CPT | Performed by: FAMILY MEDICINE

## 2023-10-23 PROCEDURE — 99214 OFFICE O/P EST MOD 30 MIN: CPT | Performed by: FAMILY MEDICINE

## 2023-10-23 PROCEDURE — 90694 VACC AIIV4 NO PRSRV 0.5ML IM: CPT | Performed by: FAMILY MEDICINE

## 2023-10-23 PROCEDURE — 3078F DIAST BP <80 MM HG: CPT | Performed by: FAMILY MEDICINE

## 2023-10-23 PROCEDURE — 1036F TOBACCO NON-USER: CPT | Performed by: FAMILY MEDICINE

## 2023-10-23 PROCEDURE — G8399 PT W/DXA RESULTS DOCUMENT: HCPCS | Performed by: FAMILY MEDICINE

## 2023-10-23 PROCEDURE — G0008 ADMIN INFLUENZA VIRUS VAC: HCPCS | Performed by: FAMILY MEDICINE

## 2023-10-23 PROCEDURE — 1123F ACP DISCUSS/DSCN MKR DOCD: CPT | Performed by: FAMILY MEDICINE

## 2023-10-23 PROCEDURE — G8427 DOCREV CUR MEDS BY ELIG CLIN: HCPCS | Performed by: FAMILY MEDICINE

## 2023-10-23 RX ORDER — HYDROQUINONE 40 MG/G
CREAM TOPICAL
Qty: 28.35 G | Refills: 5 | Status: SHIPPED | OUTPATIENT
Start: 2023-10-23

## 2023-10-23 ASSESSMENT — PATIENT HEALTH QUESTIONNAIRE - PHQ9
2. FEELING DOWN, DEPRESSED OR HOPELESS: 1
SUM OF ALL RESPONSES TO PHQ QUESTIONS 1-9: 2
SUM OF ALL RESPONSES TO PHQ QUESTIONS 1-9: 2
1. LITTLE INTEREST OR PLEASURE IN DOING THINGS: 1
SUM OF ALL RESPONSES TO PHQ9 QUESTIONS 1 & 2: 2
SUM OF ALL RESPONSES TO PHQ QUESTIONS 1-9: 2
SUM OF ALL RESPONSES TO PHQ QUESTIONS 1-9: 2

## 2023-10-24 LAB — HBA1C MFR BLD: 7.2 % (ref 4.8–5.6)

## 2023-10-25 LAB
BUN SERPL-MCNC: 25 MG/DL (ref 8–27)
BUN/CREAT SERPL: 16 (ref 12–28)
CALCIUM SERPL-MCNC: 9.7 MG/DL (ref 8.7–10.3)
CHLORIDE SERPL-SCNC: 103 MMOL/L (ref 96–106)
CO2 SERPL-SCNC: 20 MMOL/L (ref 20–29)
CREAT SERPL-MCNC: 1.53 MG/DL (ref 0.57–1)
EGFRCR SERPLBLD CKD-EPI 2021: 33 ML/MIN/1.73
GLUCOSE SERPL-MCNC: 146 MG/DL (ref 70–99)
POTASSIUM SERPL-SCNC: 4 MMOL/L (ref 3.5–5.2)
REPORT: NORMAL
SODIUM SERPL-SCNC: 142 MMOL/L (ref 134–144)

## 2023-11-13 ENCOUNTER — OFFICE VISIT (OUTPATIENT)
Facility: CLINIC | Age: 83
End: 2023-11-13
Payer: MEDICARE

## 2023-11-13 VITALS
DIASTOLIC BLOOD PRESSURE: 94 MMHG | TEMPERATURE: 98.1 F | OXYGEN SATURATION: 96 % | HEIGHT: 63 IN | WEIGHT: 140 LBS | BODY MASS INDEX: 24.8 KG/M2 | SYSTOLIC BLOOD PRESSURE: 170 MMHG | RESPIRATION RATE: 20 BRPM | HEART RATE: 93 BPM

## 2023-11-13 DIAGNOSIS — M17.11 ARTHRITIS OF KNEE, RIGHT: Primary | ICD-10-CM

## 2023-11-13 PROCEDURE — 1090F PRES/ABSN URINE INCON ASSESS: CPT | Performed by: FAMILY MEDICINE

## 2023-11-13 PROCEDURE — 1036F TOBACCO NON-USER: CPT | Performed by: FAMILY MEDICINE

## 2023-11-13 PROCEDURE — 3077F SYST BP >= 140 MM HG: CPT | Performed by: FAMILY MEDICINE

## 2023-11-13 PROCEDURE — 1123F ACP DISCUSS/DSCN MKR DOCD: CPT | Performed by: FAMILY MEDICINE

## 2023-11-13 PROCEDURE — G8399 PT W/DXA RESULTS DOCUMENT: HCPCS | Performed by: FAMILY MEDICINE

## 2023-11-13 PROCEDURE — G8420 CALC BMI NORM PARAMETERS: HCPCS | Performed by: FAMILY MEDICINE

## 2023-11-13 PROCEDURE — G8427 DOCREV CUR MEDS BY ELIG CLIN: HCPCS | Performed by: FAMILY MEDICINE

## 2023-11-13 PROCEDURE — G8484 FLU IMMUNIZE NO ADMIN: HCPCS | Performed by: FAMILY MEDICINE

## 2023-11-13 PROCEDURE — 99213 OFFICE O/P EST LOW 20 MIN: CPT | Performed by: FAMILY MEDICINE

## 2023-11-13 PROCEDURE — 3080F DIAST BP >= 90 MM HG: CPT | Performed by: FAMILY MEDICINE

## 2023-11-13 ASSESSMENT — PATIENT HEALTH QUESTIONNAIRE - PHQ9
SUM OF ALL RESPONSES TO PHQ QUESTIONS 1-9: 2
SUM OF ALL RESPONSES TO PHQ9 QUESTIONS 1 & 2: 2
SUM OF ALL RESPONSES TO PHQ QUESTIONS 1-9: 2
SUM OF ALL RESPONSES TO PHQ QUESTIONS 1-9: 2
1. LITTLE INTEREST OR PLEASURE IN DOING THINGS: 1
2. FEELING DOWN, DEPRESSED OR HOPELESS: 1
SUM OF ALL RESPONSES TO PHQ QUESTIONS 1-9: 2

## 2023-11-13 NOTE — PROGRESS NOTES
Chief Complaint   Patient presents with    Knee Pain     Right Knee \"gave way\" last night walking the he kitchen and she nearly fell - no pain when sitting - pain worsens with weight bearing

## 2023-12-04 RX ORDER — ALENDRONATE SODIUM 70 MG/1
70 TABLET ORAL
Qty: 12 TABLET | Refills: 1 | Status: SHIPPED | OUTPATIENT
Start: 2023-12-04

## 2024-01-04 LAB — HBA1C MFR BLD HPLC: 6.7 %

## 2024-02-26 RX ORDER — OMEPRAZOLE 20 MG/1
20 CAPSULE, DELAYED RELEASE ORAL DAILY
Qty: 90 CAPSULE | Refills: 1 | Status: SHIPPED | OUTPATIENT
Start: 2024-02-26

## 2024-03-05 NOTE — PROGRESS NOTES
Arthritis     Cancer (HCC)     scalp skin CA    Carotid stenosis, bilateral     <50%    Chronic sinusitis     Diverticulosis     GERD (gastroesophageal reflux disease)     Hyperlipidemia 2010    Hypertension     Insomnia due to stress     Irritable bladder     Osteoporosis     Prediabetes     Psychiatric disorder     anxiety    Shingles      Social History     Tobacco Use    Smoking status: Former     Current packs/day: 0.00     Types: Cigarettes     Quit date: 1990     Years since quittin.8    Smokeless tobacco: Never   Substance Use Topics    Alcohol use: No     Alcohol/week: 0.0 standard drinks of alcohol             Review of Systems  Pertinent items are noted in HPI.  Walking when she fell  No syncope    Objective:     Significant for the following:   /64   Pulse (!) 106   Temp 97.9 °F (36.6 °C) (Oral)   Resp 18   Ht 1.6 m (5' 3\")   Wt 63 kg (139 lb)   SpO2 97%   BMI 24.62 kg/m²       WD WN female NAD        Assessment/Plan:     Follow-up diabetes stable.  Diabetic issues reviewed with her: all medications, side effects and compliance discussed carefully.      Diagnosis Orders   1. Closed fracture of proximal end of left humerus with routine healing, unspecified fracture morphology, subsequent encounter        2. Type 2 diabetes mellitus with chronic kidney disease, without long-term current use of insulin, unspecified CKD stage (HCC)        3. Stage 3 chronic kidney disease, unspecified whether stage 3a or 3b CKD (HCC)        4. Essential hypertension        5. Mixed hyperlipidemia          Above issues stable  Continue physical therapy    2-month follow-up

## 2024-03-06 ENCOUNTER — OFFICE VISIT (OUTPATIENT)
Facility: CLINIC | Age: 84
End: 2024-03-06

## 2024-03-06 VITALS
HEART RATE: 106 BPM | SYSTOLIC BLOOD PRESSURE: 128 MMHG | DIASTOLIC BLOOD PRESSURE: 64 MMHG | HEIGHT: 63 IN | TEMPERATURE: 97.9 F | OXYGEN SATURATION: 97 % | WEIGHT: 139 LBS | RESPIRATION RATE: 18 BRPM | BODY MASS INDEX: 24.63 KG/M2

## 2024-03-06 DIAGNOSIS — E11.22 TYPE 2 DIABETES MELLITUS WITH CHRONIC KIDNEY DISEASE, WITHOUT LONG-TERM CURRENT USE OF INSULIN, UNSPECIFIED CKD STAGE (HCC): ICD-10-CM

## 2024-03-06 DIAGNOSIS — S42.202D CLOSED FRACTURE OF PROXIMAL END OF LEFT HUMERUS WITH ROUTINE HEALING, UNSPECIFIED FRACTURE MORPHOLOGY, SUBSEQUENT ENCOUNTER: Primary | ICD-10-CM

## 2024-03-06 DIAGNOSIS — I10 ESSENTIAL HYPERTENSION: ICD-10-CM

## 2024-03-06 DIAGNOSIS — E78.2 MIXED HYPERLIPIDEMIA: ICD-10-CM

## 2024-03-06 DIAGNOSIS — N18.30 STAGE 3 CHRONIC KIDNEY DISEASE, UNSPECIFIED WHETHER STAGE 3A OR 3B CKD (HCC): ICD-10-CM

## 2024-04-20 DIAGNOSIS — F41.9 ANXIETY: ICD-10-CM

## 2024-04-22 RX ORDER — ALENDRONATE SODIUM 70 MG/1
70 TABLET ORAL
Qty: 12 TABLET | Refills: 1 | Status: SHIPPED | OUTPATIENT
Start: 2024-04-22

## 2024-04-22 RX ORDER — BUSPIRONE HYDROCHLORIDE 5 MG/1
5 TABLET ORAL 2 TIMES DAILY
Qty: 180 TABLET | Refills: 1 | Status: SHIPPED | OUTPATIENT
Start: 2024-04-22

## 2024-05-07 ENCOUNTER — OFFICE VISIT (OUTPATIENT)
Facility: CLINIC | Age: 84
End: 2024-05-07
Payer: MEDICARE

## 2024-05-07 VITALS
WEIGHT: 127 LBS | RESPIRATION RATE: 16 BRPM | BODY MASS INDEX: 22.5 KG/M2 | HEIGHT: 63 IN | OXYGEN SATURATION: 97 % | SYSTOLIC BLOOD PRESSURE: 105 MMHG | DIASTOLIC BLOOD PRESSURE: 66 MMHG | HEART RATE: 81 BPM | TEMPERATURE: 97.7 F

## 2024-05-07 DIAGNOSIS — I10 ESSENTIAL HYPERTENSION: ICD-10-CM

## 2024-05-07 DIAGNOSIS — N18.30 STAGE 3 CHRONIC KIDNEY DISEASE, UNSPECIFIED WHETHER STAGE 3A OR 3B CKD (HCC): ICD-10-CM

## 2024-05-07 DIAGNOSIS — J30.1 SEASONAL ALLERGIC RHINITIS DUE TO POLLEN: ICD-10-CM

## 2024-05-07 DIAGNOSIS — S42.202D CLOSED FRACTURE OF PROXIMAL END OF LEFT HUMERUS WITH ROUTINE HEALING, UNSPECIFIED FRACTURE MORPHOLOGY, SUBSEQUENT ENCOUNTER: ICD-10-CM

## 2024-05-07 DIAGNOSIS — Z91.81 AT HIGH RISK FOR FALLS: ICD-10-CM

## 2024-05-07 DIAGNOSIS — Z00.00 MEDICARE ANNUAL WELLNESS VISIT, SUBSEQUENT: Primary | ICD-10-CM

## 2024-05-07 DIAGNOSIS — N31.8 BLADDER HYPERTONICITY: ICD-10-CM

## 2024-05-07 DIAGNOSIS — S72.002D HIP FRACTURE REQUIRING OPERATIVE REPAIR, LEFT, CLOSED, WITH ROUTINE HEALING, SUBSEQUENT ENCOUNTER: ICD-10-CM

## 2024-05-07 DIAGNOSIS — E11.22 TYPE 2 DIABETES MELLITUS WITH CHRONIC KIDNEY DISEASE, WITHOUT LONG-TERM CURRENT USE OF INSULIN, UNSPECIFIED CKD STAGE (HCC): ICD-10-CM

## 2024-05-07 DIAGNOSIS — K21.9 GASTRO-ESOPHAGEAL REFLUX DISEASE WITHOUT ESOPHAGITIS: ICD-10-CM

## 2024-05-07 PROCEDURE — 1090F PRES/ABSN URINE INCON ASSESS: CPT | Performed by: FAMILY MEDICINE

## 2024-05-07 PROCEDURE — G8399 PT W/DXA RESULTS DOCUMENT: HCPCS | Performed by: FAMILY MEDICINE

## 2024-05-07 PROCEDURE — G8427 DOCREV CUR MEDS BY ELIG CLIN: HCPCS | Performed by: FAMILY MEDICINE

## 2024-05-07 PROCEDURE — 1123F ACP DISCUSS/DSCN MKR DOCD: CPT | Performed by: FAMILY MEDICINE

## 2024-05-07 PROCEDURE — 3078F DIAST BP <80 MM HG: CPT | Performed by: FAMILY MEDICINE

## 2024-05-07 PROCEDURE — 3074F SYST BP LT 130 MM HG: CPT | Performed by: FAMILY MEDICINE

## 2024-05-07 PROCEDURE — G8420 CALC BMI NORM PARAMETERS: HCPCS | Performed by: FAMILY MEDICINE

## 2024-05-07 PROCEDURE — 1036F TOBACCO NON-USER: CPT | Performed by: FAMILY MEDICINE

## 2024-05-07 PROCEDURE — 99214 OFFICE O/P EST MOD 30 MIN: CPT | Performed by: FAMILY MEDICINE

## 2024-05-07 PROCEDURE — 3044F HG A1C LEVEL LT 7.0%: CPT | Performed by: FAMILY MEDICINE

## 2024-05-07 PROCEDURE — G0439 PPPS, SUBSEQ VISIT: HCPCS | Performed by: FAMILY MEDICINE

## 2024-05-07 RX ORDER — FLUTICASONE PROPIONATE 50 MCG
2 SPRAY, SUSPENSION (ML) NASAL DAILY
Qty: 48 G | Refills: 5 | Status: SHIPPED | OUTPATIENT
Start: 2024-05-07

## 2024-05-07 RX ORDER — OXYBUTYNIN CHLORIDE 5 MG/1
5 TABLET ORAL
Qty: 90 TABLET | Refills: 1 | Status: SHIPPED | OUTPATIENT
Start: 2024-05-07

## 2024-05-07 RX ORDER — AMLODIPINE BESYLATE 5 MG/1
5 TABLET ORAL DAILY
Qty: 90 TABLET | Refills: 3 | Status: SHIPPED | OUTPATIENT
Start: 2024-05-07

## 2024-05-07 RX ORDER — OMEPRAZOLE 20 MG/1
20 CAPSULE, DELAYED RELEASE ORAL DAILY
Qty: 90 CAPSULE | Refills: 1 | Status: SHIPPED | OUTPATIENT
Start: 2024-05-07

## 2024-05-07 RX ORDER — LISINOPRIL 20 MG/1
20 TABLET ORAL DAILY
Qty: 90 TABLET | Refills: 3 | Status: SHIPPED | OUTPATIENT
Start: 2024-05-07

## 2024-05-07 SDOH — ECONOMIC STABILITY: FOOD INSECURITY: WITHIN THE PAST 12 MONTHS, YOU WORRIED THAT YOUR FOOD WOULD RUN OUT BEFORE YOU GOT MONEY TO BUY MORE.: NEVER TRUE

## 2024-05-07 SDOH — ECONOMIC STABILITY: FOOD INSECURITY: WITHIN THE PAST 12 MONTHS, THE FOOD YOU BOUGHT JUST DIDN'T LAST AND YOU DIDN'T HAVE MONEY TO GET MORE.: NEVER TRUE

## 2024-05-07 SDOH — ECONOMIC STABILITY: INCOME INSECURITY: HOW HARD IS IT FOR YOU TO PAY FOR THE VERY BASICS LIKE FOOD, HOUSING, MEDICAL CARE, AND HEATING?: NOT HARD AT ALL

## 2024-05-07 ASSESSMENT — PATIENT HEALTH QUESTIONNAIRE - PHQ9
SUM OF ALL RESPONSES TO PHQ9 QUESTIONS 1 & 2: 2
2. FEELING DOWN, DEPRESSED OR HOPELESS: SEVERAL DAYS
1. LITTLE INTEREST OR PLEASURE IN DOING THINGS: SEVERAL DAYS
SUM OF ALL RESPONSES TO PHQ QUESTIONS 1-9: 2

## 2024-05-07 ASSESSMENT — ANXIETY QUESTIONNAIRES
1. FEELING NERVOUS, ANXIOUS, OR ON EDGE: SEVERAL DAYS
IF YOU CHECKED OFF ANY PROBLEMS ON THIS QUESTIONNAIRE, HOW DIFFICULT HAVE THESE PROBLEMS MADE IT FOR YOU TO DO YOUR WORK, TAKE CARE OF THINGS AT HOME, OR GET ALONG WITH OTHER PEOPLE: SOMEWHAT DIFFICULT
2. NOT BEING ABLE TO STOP OR CONTROL WORRYING: SEVERAL DAYS

## 2024-05-07 NOTE — PROGRESS NOTES
Chief Complaint   Patient presents with    Shoulder Pain     L/shoulder pain    Medicare AWV     Clock Test was done     Patient has not been out of the country in (14 months), NO diarrhea, NO cough, NO chest conjestion, NO temp.  Pt has not been around anyone with these symptoms.     Health Maintenance reviewed.    I have reviewed the patient's medical history in detail and updated the computerized patient record.    \"Have you been to the ER, urgent care clinic since your last visit?  No Hospitalized since your last visit?\"    no    “Have you seen or consulted any other health care providers outside of [facility] since your last visit?”    no                                         
subsequent encounter  6. Hip fracture requiring operative repair, left, closed, with routine healing, subsequent encounter  7. Essential hypertension  -     amLODIPine (NORVASC) 5 MG tablet; Take 1 tablet by mouth daily, Disp-90 tablet, R-3Normal  -     lisinopril (PRINIVIL;ZESTRIL) 20 MG tablet; Take 1 tablet by mouth daily, Disp-90 tablet, R-3Normal  8. Gastro-esophageal reflux disease without esophagitis  -     omeprazole (PRILOSEC) 20 MG delayed release capsule; Take 1 capsule by mouth daily, Disp-90 capsule, R-1Normal  9. Seasonal allergic rhinitis due to pollen  -     fluticasone (FLONASE) 50 MCG/ACT nasal spray; 2 sprays by Each Nostril route daily, Disp-48 g, R-5Normal  10. Bladder hypertonicity  -     oxyBUTYnin (DITROPAN) 5 MG tablet; Take 1 tablet by mouth nightly, Disp-90 tablet, R-1Normal    Return in about 3 months (around 8/7/2024).    Follow-up diabetes stable.  Diabetic issues reviewed with her: all medications, side effects and compliance discussed carefully.        Subjective:     Camila Tolbert is a 83 y.o. female seen for follow-up of diabetes.     She has had hypoglycemic attacks. .no  Blood sugar control has been ok    Hemoglobin A1C   Date Value Ref Range Status   05/07/2024 6.7 (H) 4.8 - 5.6 % Final     Comment:                 Prediabetes: 5.7 - 6.4           Diabetes: >6.4           Glycemic control for adults with diabetes: <7.0       Lab Results   Component Value Date/Time     05/07/2024 11:32 AM    K 3.9 05/07/2024 11:32 AM     05/07/2024 11:32 AM    CO2 21 05/07/2024 11:32 AM    BUN 15 05/07/2024 11:32 AM    CREATININE 1.31 05/07/2024 11:32 AM    GLUCOSE 114 05/07/2024 11:32 AM    CALCIUM 10.0 05/07/2024 11:32 AM    LABGLOM 40 05/07/2024 11:32 AM    LABGLOM 33 10/23/2023 08:46 AM        She has the following problems:  Patient Active Problem List   Diagnosis    Irritable bladder    Essential hypertension    Hip fracture requiring operative repair, left, closed, with

## 2024-05-08 LAB
BUN SERPL-MCNC: 15 MG/DL (ref 8–27)
BUN/CREAT SERPL: 11 (ref 12–28)
CALCIUM SERPL-MCNC: 10 MG/DL (ref 8.7–10.3)
CHLORIDE SERPL-SCNC: 101 MMOL/L (ref 96–106)
CO2 SERPL-SCNC: 21 MMOL/L (ref 20–29)
CREAT SERPL-MCNC: 1.31 MG/DL (ref 0.57–1)
EGFRCR SERPLBLD CKD-EPI 2021: 40 ML/MIN/1.73
GLUCOSE SERPL-MCNC: 114 MG/DL (ref 70–99)
HBA1C MFR BLD: 6.7 % (ref 4.8–5.6)
Lab: NORMAL
POTASSIUM SERPL-SCNC: 3.9 MMOL/L (ref 3.5–5.2)
REPORT: NORMAL
SODIUM SERPL-SCNC: 138 MMOL/L (ref 134–144)
SPECIMEN STATUS REPORT: NORMAL

## 2024-08-05 PROBLEM — N18.31 TYPE 2 DIABETES MELLITUS WITH STAGE 3A CHRONIC KIDNEY DISEASE, WITHOUT LONG-TERM CURRENT USE OF INSULIN (HCC): Status: ACTIVE | Noted: 2022-07-06

## 2024-08-05 PROBLEM — E11.22 TYPE 2 DIABETES MELLITUS WITH STAGE 3A CHRONIC KIDNEY DISEASE, WITHOUT LONG-TERM CURRENT USE OF INSULIN (HCC): Status: ACTIVE | Noted: 2022-07-06

## 2024-08-05 NOTE — PROGRESS NOTES
Assessment/Plan:        Assessment & Plan  1. Hypertension.  Her blood pressure readings are 128/82 mmHg and 138/78 mmHg, indicating good control. She is advised to continue her current regimen and monitor her blood pressure regularly.     2. Osteoporosis.  She is currently taking Fosamax once weekly to strengthen her bones. She should continue this medication as prescribed.    Follow-up  A follow-up visit is scheduled for the new year.    Results    1. Type 2 diabetes mellitus with stage 3a chronic kidney disease, without long-term current use of insulin ([facility])  2. Age-related osteoporosis without current pathological fracture  3. Essential hypertension  4. Irritable bladder  5. Mixed hyperlipidemia  6. Anxiety  -     busPIRone (BUSPAR) 5 MG tablet; Take 1 tablet by mouth 2 times daily, Disp-180 tablet, R-2Normal  7. Gastro-esophageal reflux disease without esophagitis  -     omeprazole (PRILOSEC) 20 MG delayed release capsule; Take 1 capsule by mouth daily, Disp-90 capsule, R-2Normal           Orders Placed This Encounter    alendronate (FOSAMAX) 70 MG tablet     Sig: Take 1 tablet by mouth every 7 days     Dispense:  12 tablet     Refill:  2    busPIRone (BUSPAR) 5 MG tablet     Sig: Take 1 tablet by mouth 2 times daily     Dispense:  180 tablet     Refill:  2    omeprazole (PRILOSEC) 20 MG delayed release capsule     Sig: Take 1 capsule by mouth daily     Dispense:  90 capsule     Refill:  2       Current Outpatient Medications   Medication Sig Dispense Refill    alendronate (FOSAMAX) 70 MG tablet Take 1 tablet by mouth every 7 days 12 tablet 2    busPIRone (BUSPAR) 5 MG tablet Take 1 tablet by mouth 2 times daily 180 tablet 2    omeprazole (PRILOSEC) 20 MG delayed release capsule Take 1 capsule by mouth daily 90 capsule 2    amLODIPine (NORVASC) 5 MG tablet Take 1 tablet by mouth daily 90 tablet 3    fluticasone (FLONASE) 50 MCG/ACT nasal spray 2 sprays by Each Nostril route daily 48 g 5    lisinopril

## 2024-08-06 ENCOUNTER — OFFICE VISIT (OUTPATIENT)
Facility: CLINIC | Age: 84
End: 2024-08-06
Payer: MEDICARE

## 2024-08-06 VITALS
TEMPERATURE: 97.8 F | OXYGEN SATURATION: 97 % | DIASTOLIC BLOOD PRESSURE: 78 MMHG | BODY MASS INDEX: 22.86 KG/M2 | HEART RATE: 81 BPM | WEIGHT: 129 LBS | SYSTOLIC BLOOD PRESSURE: 138 MMHG | HEIGHT: 63 IN | RESPIRATION RATE: 16 BRPM

## 2024-08-06 DIAGNOSIS — M81.0 AGE-RELATED OSTEOPOROSIS WITHOUT CURRENT PATHOLOGICAL FRACTURE: ICD-10-CM

## 2024-08-06 DIAGNOSIS — E78.2 MIXED HYPERLIPIDEMIA: ICD-10-CM

## 2024-08-06 DIAGNOSIS — N32.89 IRRITABLE BLADDER: ICD-10-CM

## 2024-08-06 DIAGNOSIS — N18.31 TYPE 2 DIABETES MELLITUS WITH STAGE 3A CHRONIC KIDNEY DISEASE, WITHOUT LONG-TERM CURRENT USE OF INSULIN (HCC): Primary | ICD-10-CM

## 2024-08-06 DIAGNOSIS — I10 ESSENTIAL HYPERTENSION: ICD-10-CM

## 2024-08-06 DIAGNOSIS — K21.9 GASTRO-ESOPHAGEAL REFLUX DISEASE WITHOUT ESOPHAGITIS: ICD-10-CM

## 2024-08-06 DIAGNOSIS — F41.9 ANXIETY: ICD-10-CM

## 2024-08-06 DIAGNOSIS — E11.22 TYPE 2 DIABETES MELLITUS WITH STAGE 3A CHRONIC KIDNEY DISEASE, WITHOUT LONG-TERM CURRENT USE OF INSULIN (HCC): Primary | ICD-10-CM

## 2024-08-06 PROCEDURE — 1036F TOBACCO NON-USER: CPT | Performed by: FAMILY MEDICINE

## 2024-08-06 PROCEDURE — 3075F SYST BP GE 130 - 139MM HG: CPT | Performed by: FAMILY MEDICINE

## 2024-08-06 PROCEDURE — 1123F ACP DISCUSS/DSCN MKR DOCD: CPT | Performed by: FAMILY MEDICINE

## 2024-08-06 PROCEDURE — 3078F DIAST BP <80 MM HG: CPT | Performed by: FAMILY MEDICINE

## 2024-08-06 PROCEDURE — 99214 OFFICE O/P EST MOD 30 MIN: CPT | Performed by: FAMILY MEDICINE

## 2024-08-06 PROCEDURE — G8427 DOCREV CUR MEDS BY ELIG CLIN: HCPCS | Performed by: FAMILY MEDICINE

## 2024-08-06 PROCEDURE — 3044F HG A1C LEVEL LT 7.0%: CPT | Performed by: FAMILY MEDICINE

## 2024-08-06 PROCEDURE — G8420 CALC BMI NORM PARAMETERS: HCPCS | Performed by: FAMILY MEDICINE

## 2024-08-06 PROCEDURE — G8399 PT W/DXA RESULTS DOCUMENT: HCPCS | Performed by: FAMILY MEDICINE

## 2024-08-06 PROCEDURE — G2211 COMPLEX E/M VISIT ADD ON: HCPCS | Performed by: FAMILY MEDICINE

## 2024-08-06 PROCEDURE — 1090F PRES/ABSN URINE INCON ASSESS: CPT | Performed by: FAMILY MEDICINE

## 2024-08-06 RX ORDER — BUSPIRONE HYDROCHLORIDE 5 MG/1
5 TABLET ORAL 2 TIMES DAILY
Qty: 180 TABLET | Refills: 2 | Status: SHIPPED | OUTPATIENT
Start: 2024-08-06

## 2024-08-06 RX ORDER — OMEPRAZOLE 20 MG/1
20 CAPSULE, DELAYED RELEASE ORAL DAILY
Qty: 90 CAPSULE | Refills: 2 | Status: SHIPPED | OUTPATIENT
Start: 2024-08-06

## 2024-08-06 RX ORDER — ALENDRONATE SODIUM 70 MG/1
70 TABLET ORAL
Qty: 12 TABLET | Refills: 2 | Status: SHIPPED | OUTPATIENT
Start: 2024-08-06

## 2024-08-06 NOTE — PROGRESS NOTES
Chief Complaint   Patient presents with    Hypertension     3 mth FU     Patient has not been out of the country in (14 months), NO diarrhea, NO cough, NO chest conjestion, NO temp.  Pt has not been around anyone with these symptoms.     Health Maintenance reviewed.    I have reviewed the patient's medical history in detail and updated the computerized patient record.    \"Have you been to the ER, urgent care clinic since you.cc  Chief Complaint   Patient presents with    Hypertension     3 mth FU     r last visit?  No Hospitalized since your last visit?\"    no    “Have you seen or consulted any other health care providers outside of [facility] since your last visit?”    no

## 2024-08-08 ENCOUNTER — TELEPHONE (OUTPATIENT)
Facility: CLINIC | Age: 84
End: 2024-08-08

## 2024-08-08 NOTE — TELEPHONE ENCOUNTER
Called patient regarding her refill request for Amlodipine - reminded her that Dr White called in a years supply of this medication in May 2024 - she will need to call the number on her prescription bottle and request the refill to be sent if she is not on automatic refills from [facility]  Nica Harding LPN 8/8/2024 3:09 PM

## 2024-08-26 ENCOUNTER — TELEPHONE (OUTPATIENT)
Facility: CLINIC | Age: 84
End: 2024-08-26

## 2024-08-26 NOTE — TELEPHONE ENCOUNTER
Trazodone 50 mg, I po at bedtime needs to be sent to [facility]. Pharmacy said they have requested but no response.

## 2024-08-27 DIAGNOSIS — F51.02 ADJUSTMENT INSOMNIA: Primary | ICD-10-CM

## 2024-08-27 RX ORDER — TRAZODONE HYDROCHLORIDE 50 MG/1
50 TABLET, FILM COATED ORAL NIGHTLY PRN
Qty: 90 TABLET | Refills: 1 | Status: SHIPPED | OUTPATIENT
Start: 2024-08-27

## 2024-09-09 DIAGNOSIS — K21.9 GASTRO-ESOPHAGEAL REFLUX DISEASE WITHOUT ESOPHAGITIS: ICD-10-CM

## 2024-09-16 ENCOUNTER — TELEPHONE (OUTPATIENT)
Facility: CLINIC | Age: 84
End: 2024-09-16

## 2024-09-16 DIAGNOSIS — K21.9 GASTRO-ESOPHAGEAL REFLUX DISEASE WITHOUT ESOPHAGITIS: ICD-10-CM

## 2024-09-16 NOTE — TELEPHONE ENCOUNTER
Pt need her refill called into [facility], [place]      omeprazole (PRILOSEC) 20 MG delayed release capsule

## 2024-11-12 ENCOUNTER — OFFICE VISIT (OUTPATIENT)
Facility: CLINIC | Age: 84
End: 2024-11-12

## 2024-11-12 VITALS
RESPIRATION RATE: 16 BRPM | TEMPERATURE: 98.1 F | HEART RATE: 90 BPM | SYSTOLIC BLOOD PRESSURE: 160 MMHG | OXYGEN SATURATION: 96 % | BODY MASS INDEX: 22.68 KG/M2 | DIASTOLIC BLOOD PRESSURE: 74 MMHG | HEIGHT: 63 IN | WEIGHT: 128 LBS

## 2024-11-12 DIAGNOSIS — F41.1 ANXIETY REACTION: Primary | ICD-10-CM

## 2024-11-12 DIAGNOSIS — N18.31 TYPE 2 DIABETES MELLITUS WITH STAGE 3A CHRONIC KIDNEY DISEASE, WITHOUT LONG-TERM CURRENT USE OF INSULIN (HCC): ICD-10-CM

## 2024-11-12 DIAGNOSIS — E11.22 TYPE 2 DIABETES MELLITUS WITH STAGE 3A CHRONIC KIDNEY DISEASE, WITHOUT LONG-TERM CURRENT USE OF INSULIN (HCC): ICD-10-CM

## 2024-11-12 DIAGNOSIS — R25.1 TREMOR OF BOTH HANDS: ICD-10-CM

## 2024-11-12 RX ORDER — PROPRANOLOL HCL 20 MG
20 TABLET ORAL 2 TIMES DAILY
Qty: 14 TABLET | Refills: 0 | Status: SHIPPED | OUTPATIENT
Start: 2024-11-12 | End: 2024-11-12 | Stop reason: SDUPTHER

## 2024-11-12 RX ORDER — PROPRANOLOL HCL 20 MG
20 TABLET ORAL 2 TIMES DAILY
Qty: 60 TABLET | Refills: 1 | Status: SHIPPED | OUTPATIENT
Start: 2024-11-12

## 2024-11-12 NOTE — PROGRESS NOTES
Subjective:     Camila Tolbert is a 84 y.o. female who presents for the following:  Chief Complaint   Patient presents with    Dizziness     Dizziness x2 day       She has the following problems:  Patient Active Problem List   Diagnosis    Irritable bladder    Essential hypertension    Hip fracture requiring operative repair, left, closed, with routine healing, subsequent encounter    Age-related osteoporosis without current pathological fracture    Mixed hyperlipidemia    Diverticulosis    GERD (gastroesophageal reflux disease)    Type 2 diabetes mellitus with stage 3a chronic kidney disease, without long-term current use of insulin ([facility])    Chronic renal disease, stage III ([facility])            Assessment & Plan  1. Tremors.  The tremors could be a response to a stressful event, which may resolve spontaneously. However, she expressed a desire for intervention to expedite recovery. A prescription for propranolol was provided, to be taken twice daily for a week. This should alleviate the tremors and induce relaxation. She was advised to complete her blood work prior to her next appointment on January 6, 2025. She was also instructed to bring all her medications to the next visit to ensure adherence to her prescribed regimen. Should the tremors persist, she is to contact the office for further evaluation.    2. Anxiety.  The patient experienced significant anxiety related to a stressful event, which may have contributed to her tremors. Propranolol was prescribed to help manage her anxiety and tremors. She was informed that the medication might make her a little tired. If the symptoms do not improve, she is advised to contact the office for further evaluation.  Should help with her blood pressure as well which is a little elevated.    3. Arthritis.  She reports stiffness and difficulty with finger movement, likely due to arthritis. She was advised that these symptoms are common with aging and to continue managing

## 2024-11-12 NOTE — PROGRESS NOTES
Chief Complaint   Patient presents with    Dizziness     Dizziness x2 day     Patient has not been out of the country in (14 months), NO diarrhea, NO cough, NO chest conjestion, NO temp.  Pt has not been around anyone with these symptoms.     Health Maintenance reviewed.    I have reviewed the patient's medical history in detail and updated the computerized patient record.    \"Have you been to the ER, urgent care clinic since your last visit?  No Hospitalized since your last visit?\"    no    “Have you seen or consulted any other health care providers outside of [facility] since your last visit?”    no

## 2025-01-22 DIAGNOSIS — F51.02 ADJUSTMENT INSOMNIA: ICD-10-CM

## 2025-01-22 RX ORDER — TRAZODONE HYDROCHLORIDE 50 MG/1
TABLET, FILM COATED ORAL
Qty: 90 TABLET | Refills: 1 | Status: SHIPPED | OUTPATIENT
Start: 2025-01-22

## 2025-01-26 DIAGNOSIS — I10 ESSENTIAL HYPERTENSION: ICD-10-CM

## 2025-01-26 DIAGNOSIS — N31.8 BLADDER HYPERTONICITY: ICD-10-CM

## 2025-01-26 DIAGNOSIS — J30.1 SEASONAL ALLERGIC RHINITIS DUE TO POLLEN: ICD-10-CM

## 2025-01-26 DIAGNOSIS — K21.9 GASTRO-ESOPHAGEAL REFLUX DISEASE WITHOUT ESOPHAGITIS: ICD-10-CM

## 2025-01-27 RX ORDER — LISINOPRIL 20 MG/1
20 TABLET ORAL DAILY
Qty: 100 TABLET | Refills: 0 | Status: SHIPPED | OUTPATIENT
Start: 2025-01-27

## 2025-01-27 RX ORDER — FLUTICASONE PROPIONATE 50 MCG
2 SPRAY, SUSPENSION (ML) NASAL DAILY
Qty: 48 G | Refills: 3 | Status: SHIPPED | OUTPATIENT
Start: 2025-01-27

## 2025-01-27 RX ORDER — AMLODIPINE BESYLATE 5 MG/1
5 TABLET ORAL DAILY
Qty: 100 TABLET | Refills: 0 | Status: SHIPPED | OUTPATIENT
Start: 2025-01-27

## 2025-01-27 RX ORDER — OXYBUTYNIN CHLORIDE 5 MG/1
5 TABLET ORAL NIGHTLY
Qty: 100 TABLET | Refills: 0 | Status: SHIPPED | OUTPATIENT
Start: 2025-01-27

## 2025-02-06 ENCOUNTER — OFFICE VISIT (OUTPATIENT)
Facility: CLINIC | Age: 85
End: 2025-02-06

## 2025-02-06 VITALS
HEIGHT: 63 IN | BODY MASS INDEX: 23.32 KG/M2 | HEART RATE: 92 BPM | SYSTOLIC BLOOD PRESSURE: 139 MMHG | TEMPERATURE: 97.8 F | DIASTOLIC BLOOD PRESSURE: 81 MMHG | RESPIRATION RATE: 16 BRPM | OXYGEN SATURATION: 96 % | WEIGHT: 131.6 LBS

## 2025-02-06 DIAGNOSIS — E78.2 MIXED HYPERLIPIDEMIA: ICD-10-CM

## 2025-02-06 DIAGNOSIS — M17.11 ARTHRITIS OF KNEE, RIGHT: ICD-10-CM

## 2025-02-06 DIAGNOSIS — E11.22 TYPE 2 DIABETES MELLITUS WITH STAGE 3A CHRONIC KIDNEY DISEASE, WITHOUT LONG-TERM CURRENT USE OF INSULIN (HCC): ICD-10-CM

## 2025-02-06 DIAGNOSIS — F41.9 ANXIETY: ICD-10-CM

## 2025-02-06 DIAGNOSIS — Z00.00 MEDICARE ANNUAL WELLNESS VISIT, SUBSEQUENT: Primary | ICD-10-CM

## 2025-02-06 DIAGNOSIS — I10 ESSENTIAL HYPERTENSION: ICD-10-CM

## 2025-02-06 DIAGNOSIS — N18.31 TYPE 2 DIABETES MELLITUS WITH STAGE 3A CHRONIC KIDNEY DISEASE, WITHOUT LONG-TERM CURRENT USE OF INSULIN (HCC): ICD-10-CM

## 2025-02-06 DIAGNOSIS — K21.9 GASTROESOPHAGEAL REFLUX DISEASE, UNSPECIFIED WHETHER ESOPHAGITIS PRESENT: ICD-10-CM

## 2025-02-06 DIAGNOSIS — N31.8 BLADDER HYPERTONICITY: ICD-10-CM

## 2025-02-06 DIAGNOSIS — Z23 NEED FOR VACCINATION: ICD-10-CM

## 2025-02-06 DIAGNOSIS — M19.041 ARTHRITIS OF BOTH HANDS: ICD-10-CM

## 2025-02-06 DIAGNOSIS — M19.042 ARTHRITIS OF BOTH HANDS: ICD-10-CM

## 2025-02-06 DIAGNOSIS — M81.0 AGE-RELATED OSTEOPOROSIS WITHOUT CURRENT PATHOLOGICAL FRACTURE: ICD-10-CM

## 2025-02-06 DIAGNOSIS — K21.9 GASTRO-ESOPHAGEAL REFLUX DISEASE WITHOUT ESOPHAGITIS: ICD-10-CM

## 2025-02-06 DIAGNOSIS — R25.1 TREMOR OF BOTH HANDS: ICD-10-CM

## 2025-02-06 RX ORDER — BUSPIRONE HYDROCHLORIDE 5 MG/1
5 TABLET ORAL 2 TIMES DAILY
Qty: 200 TABLET | Refills: 2 | Status: SHIPPED | OUTPATIENT
Start: 2025-02-06

## 2025-02-06 RX ORDER — AMLODIPINE BESYLATE 5 MG/1
5 TABLET ORAL DAILY
Qty: 100 TABLET | Refills: 2 | Status: SHIPPED | OUTPATIENT
Start: 2025-02-06

## 2025-02-06 RX ORDER — PROPRANOLOL HCL 20 MG
20 TABLET ORAL 2 TIMES DAILY
Qty: 200 TABLET | Refills: 2 | Status: SHIPPED | OUTPATIENT
Start: 2025-02-06

## 2025-02-06 RX ORDER — LISINOPRIL 20 MG/1
20 TABLET ORAL DAILY
Qty: 100 TABLET | Refills: 2 | Status: SHIPPED | OUTPATIENT
Start: 2025-02-06

## 2025-02-06 RX ORDER — CALCIUM CARBONATE/VITAMIN D3 500 MG-10
1 TABLET ORAL DAILY
Qty: 100 TABLET | Refills: 2 | Status: SHIPPED | OUTPATIENT
Start: 2025-02-06

## 2025-02-06 RX ORDER — OXYBUTYNIN CHLORIDE 5 MG/1
5 TABLET ORAL NIGHTLY
Qty: 100 TABLET | Refills: 2 | Status: SHIPPED | OUTPATIENT
Start: 2025-02-06

## 2025-02-06 RX ORDER — ZOSTER VACCINE RECOMBINANT, ADJUVANTED 50 MCG/0.5
0.5 KIT INTRAMUSCULAR SEE ADMIN INSTRUCTIONS
Qty: 0.5 ML | Refills: 0 | Status: SHIPPED | OUTPATIENT
Start: 2025-02-06 | End: 2025-02-07

## 2025-02-06 RX ORDER — ALENDRONATE SODIUM 70 MG/1
70 TABLET ORAL
Qty: 12 TABLET | Refills: 2 | Status: SHIPPED | OUTPATIENT
Start: 2025-02-06

## 2025-02-06 SDOH — ECONOMIC STABILITY: FOOD INSECURITY: WITHIN THE PAST 12 MONTHS, THE FOOD YOU BOUGHT JUST DIDN'T LAST AND YOU DIDN'T HAVE MONEY TO GET MORE.: NEVER TRUE

## 2025-02-06 SDOH — ECONOMIC STABILITY: FOOD INSECURITY: WITHIN THE PAST 12 MONTHS, YOU WORRIED THAT YOUR FOOD WOULD RUN OUT BEFORE YOU GOT MONEY TO BUY MORE.: NEVER TRUE

## 2025-02-06 ASSESSMENT — PATIENT HEALTH QUESTIONNAIRE - PHQ9
SUM OF ALL RESPONSES TO PHQ QUESTIONS 1-9: 2
SUM OF ALL RESPONSES TO PHQ9 QUESTIONS 1 & 2: 2
2. FEELING DOWN, DEPRESSED OR HOPELESS: SEVERAL DAYS
SUM OF ALL RESPONSES TO PHQ QUESTIONS 1-9: 2
1. LITTLE INTEREST OR PLEASURE IN DOING THINGS: SEVERAL DAYS

## 2025-02-06 ASSESSMENT — LIFESTYLE VARIABLES: HOW MANY STANDARD DRINKS CONTAINING ALCOHOL DO YOU HAVE ON A TYPICAL DAY: PATIENT DOES NOT DRINK

## 2025-02-06 NOTE — PROGRESS NOTES
Health Maintenance Due   Topic Date Due    Respiratory Syncytial Virus (RSV) Pregnant or age 60 yrs+ (1 - 1-dose 75+ series) Never done    Shingles vaccine (3 of 3) 01/03/2019    Flu vaccine (1) 08/01/2024    COVID-19 Vaccine (4 - 2024-25 season) 09/01/2024    Annual Wellness Visit (Medicare Advantage)  01/01/2025       
stockings.    ALLERGIES  The patient is allergic to ANTI-INFLAMMATORIES, NAPROXEN, and TOLMETIN.    MEDICATIONS  Current: Prevagen, trazodone, Tylenol, diclofenac ointment    Social History     Socioeconomic History    Marital status:      Spouse name: Not on file    Number of children: Not on file    Years of education: Not on file    Highest education level: Not on file   Occupational History    Occupation: retired   Tobacco Use    Smoking status: Former     Current packs/day: 0.00     Types: Cigarettes     Quit date: 1990     Years since quittin.7    Smokeless tobacco: Never   Substance and Sexual Activity    Alcohol use: No     Alcohol/week: 0.0 standard drinks of alcohol    Drug use: No    Sexual activity: Not Currently     Partners: Male   Other Topics Concern    Not on file   Social History Narrative    Son lives with her, son passed away  2016 of an aneurysm, now lives alone.     Social Determinants of Health     Financial Resource Strain: Low Risk  (2024)    Overall Financial Resource Strain (CARDIA)     Difficulty of Paying Living Expenses: Not hard at all   Food Insecurity: No Food Insecurity (2025)    Hunger Vital Sign     Worried About Running Out of Food in the Last Year: Never true     Ran Out of Food in the Last Year: Never true   Transportation Needs: No Transportation Needs (2025)    PRAPARE - Transportation     Lack of Transportation (Medical): No     Lack of Transportation (Non-Medical): No   Physical Activity: Inactive (2025)    Exercise Vital Sign     Days of Exercise per Week: 0 days     Minutes of Exercise per Session: 0 min   Stress: Not on file   Social Connections: Feeling Socially Integrated (3/25/2024)    [facility]    OASIS : Social Isolation     Frequency of experiencing loneliness or isolation: Never   Intimate Partner Violence: Not At Risk (4/10/2023)    Humiliation, Afraid, Rape, and

## 2025-02-06 NOTE — PATIENT INSTRUCTIONS
Exercise has been shown to be as effective as medication in the treatment of arthritis.  If you do not exercise on a regular basis I recommend starting an exercise program. Would start with a walking program of 10 to 20 minutes of a rapid walk 3 to 4 times a week. Goal is to achieve a moderate intesity 30 minute walk, 5 days a week. Afterwards you should feel tired but not exhausted. You can increase the pace as tolerated. Other exercise maybe substituted for walking such as bicycling and gentle weight lifting.

## 2025-02-07 LAB
ALBUMIN SERPL-MCNC: 4.2 G/DL (ref 3.7–4.7)
ALP SERPL-CCNC: 90 IU/L (ref 44–121)
ALT SERPL-CCNC: 10 IU/L (ref 0–32)
AST SERPL-CCNC: 13 IU/L (ref 0–40)
BILIRUB SERPL-MCNC: 0.3 MG/DL (ref 0–1.2)
BUN SERPL-MCNC: 19 MG/DL (ref 8–27)
BUN/CREAT SERPL: 13 (ref 12–28)
CALCIUM SERPL-MCNC: 10.2 MG/DL (ref 8.7–10.3)
CHLORIDE SERPL-SCNC: 103 MMOL/L (ref 96–106)
CHOLEST SERPL-MCNC: 204 MG/DL (ref 100–199)
CO2 SERPL-SCNC: 22 MMOL/L (ref 20–29)
CREAT SERPL-MCNC: 1.43 MG/DL (ref 0.57–1)
EGFRCR SERPLBLD CKD-EPI 2021: 36 ML/MIN/1.73
GLOBULIN SER CALC-MCNC: 3 G/DL (ref 1.5–4.5)
GLUCOSE SERPL-MCNC: 139 MG/DL (ref 70–99)
HBA1C MFR BLD: 6.3 % (ref 4.8–5.6)
HDLC SERPL-MCNC: 39 MG/DL
IMP & REVIEW OF LAB RESULTS: NORMAL
LDLC SERPL CALC-MCNC: 128 MG/DL (ref 0–99)
Lab: NORMAL
POTASSIUM SERPL-SCNC: 3.9 MMOL/L (ref 3.5–5.2)
PROT SERPL-MCNC: 7.2 G/DL (ref 6–8.5)
REPORT: NORMAL
REPORT: NORMAL
SODIUM SERPL-SCNC: 141 MMOL/L (ref 134–144)
TRIGL SERPL-MCNC: 210 MG/DL (ref 0–149)
VLDLC SERPL CALC-MCNC: 37 MG/DL (ref 5–40)

## 2025-02-13 LAB
14-3-3 ETA AG SER IA-MCNC: <0.2 NG/ML
CCP IGA+IGG SERPL IA-ACNC: <20 UNITS
RHEUMATOID FACT SERPL-ACNC: <14 UNITS/ML

## 2025-03-24 NOTE — PROGRESS NOTES
Assessment/Plan:        Assessment & Plan  1. Shoulder pain.  - The etiology of the pain is likely due to a strain from moving furniture.  - Physical examination reveals tenderness in the shoulder area, with some discomfort upon movement.  - Advised to apply a topical analgesic gel to the affected area twice daily and to exercise caution when lifting objects.  - If there is no improvement within a few weeks, a referral to an orthopedic specialist may be considered.    2. Hearing loss.  - She has hearing aids but does not wear them consistently.  - Discussed the importance of wearing hearing aids regularly to improve hearing.  - Encouraged to use hearing aids more consistently and to follow up if there are any issues with them.  - No new treatment prescribed at this time.    3. Blood pressure management.  - Blood pressure is normal at today's visit.  - She is compliant with her blood pressure medication, anxiety pills, and bladder medicine.  - No changes to current medication regimen.    Follow-up  - The patient will follow up in 3 months for a routine checkup.    Results    1. Type 2 diabetes mellitus with stage 3a chronic kidney disease, without long-term current use of insulin ([facility])  2. Essential hypertension  3. Hip fracture requiring operative repair, left, closed, with routine healing, subsequent encounter  4. Irritable bladder  5. Mixed hyperlipidemia  6. Strain of rotator cuff of left shoulder  -     diclofenac sodium (VOLTAREN) 1 % GEL; Apply 4 g topically 4 times daily, Topical, 4 TIMES DAILY Starting Tue 3/25/2025, Disp-100 g, R-0, Normal           Orders Placed This Encounter    diclofenac sodium (VOLTAREN) 1 % GEL     Sig: Apply 4 g topically 4 times daily     Dispense:  100 g     Refill:  0       Current Outpatient Medications   Medication Sig Dispense Refill    diclofenac sodium (VOLTAREN) 1 % GEL Apply 4 g topically 4 times daily 100 g 0    Apoaequorin (PREVAGEN PO) Take by mouth

## 2025-03-25 ENCOUNTER — TELEPHONE (OUTPATIENT)
Facility: CLINIC | Age: 85
End: 2025-03-25

## 2025-03-25 ENCOUNTER — OFFICE VISIT (OUTPATIENT)
Facility: CLINIC | Age: 85
End: 2025-03-25
Payer: MEDICARE

## 2025-03-25 VITALS
WEIGHT: 133.2 LBS | HEART RATE: 70 BPM | BODY MASS INDEX: 23.6 KG/M2 | SYSTOLIC BLOOD PRESSURE: 135 MMHG | RESPIRATION RATE: 12 BRPM | DIASTOLIC BLOOD PRESSURE: 77 MMHG | OXYGEN SATURATION: 96 % | HEIGHT: 63 IN

## 2025-03-25 DIAGNOSIS — I10 ESSENTIAL HYPERTENSION: ICD-10-CM

## 2025-03-25 DIAGNOSIS — N32.89 IRRITABLE BLADDER: ICD-10-CM

## 2025-03-25 DIAGNOSIS — S72.002D HIP FRACTURE REQUIRING OPERATIVE REPAIR, LEFT, CLOSED, WITH ROUTINE HEALING, SUBSEQUENT ENCOUNTER: ICD-10-CM

## 2025-03-25 DIAGNOSIS — E78.2 MIXED HYPERLIPIDEMIA: ICD-10-CM

## 2025-03-25 DIAGNOSIS — S46.012A STRAIN OF ROTATOR CUFF OF LEFT SHOULDER: ICD-10-CM

## 2025-03-25 DIAGNOSIS — E11.22 TYPE 2 DIABETES MELLITUS WITH STAGE 3A CHRONIC KIDNEY DISEASE, WITHOUT LONG-TERM CURRENT USE OF INSULIN (HCC): Primary | ICD-10-CM

## 2025-03-25 DIAGNOSIS — N18.31 TYPE 2 DIABETES MELLITUS WITH STAGE 3A CHRONIC KIDNEY DISEASE, WITHOUT LONG-TERM CURRENT USE OF INSULIN (HCC): Primary | ICD-10-CM

## 2025-03-25 PROCEDURE — 3044F HG A1C LEVEL LT 7.0%: CPT | Performed by: FAMILY MEDICINE

## 2025-03-25 PROCEDURE — G8427 DOCREV CUR MEDS BY ELIG CLIN: HCPCS | Performed by: FAMILY MEDICINE

## 2025-03-25 PROCEDURE — G8399 PT W/DXA RESULTS DOCUMENT: HCPCS | Performed by: FAMILY MEDICINE

## 2025-03-25 PROCEDURE — 1090F PRES/ABSN URINE INCON ASSESS: CPT | Performed by: FAMILY MEDICINE

## 2025-03-25 PROCEDURE — 1125F AMNT PAIN NOTED PAIN PRSNT: CPT | Performed by: FAMILY MEDICINE

## 2025-03-25 PROCEDURE — 1159F MED LIST DOCD IN RCRD: CPT | Performed by: FAMILY MEDICINE

## 2025-03-25 PROCEDURE — 1036F TOBACCO NON-USER: CPT | Performed by: FAMILY MEDICINE

## 2025-03-25 PROCEDURE — 1123F ACP DISCUSS/DSCN MKR DOCD: CPT | Performed by: FAMILY MEDICINE

## 2025-03-25 PROCEDURE — 3075F SYST BP GE 130 - 139MM HG: CPT | Performed by: FAMILY MEDICINE

## 2025-03-25 PROCEDURE — 1160F RVW MEDS BY RX/DR IN RCRD: CPT | Performed by: FAMILY MEDICINE

## 2025-03-25 PROCEDURE — 99213 OFFICE O/P EST LOW 20 MIN: CPT | Performed by: FAMILY MEDICINE

## 2025-03-25 PROCEDURE — G8420 CALC BMI NORM PARAMETERS: HCPCS | Performed by: FAMILY MEDICINE

## 2025-03-25 PROCEDURE — 3078F DIAST BP <80 MM HG: CPT | Performed by: FAMILY MEDICINE

## 2025-03-25 ASSESSMENT — PATIENT HEALTH QUESTIONNAIRE - PHQ9
1. LITTLE INTEREST OR PLEASURE IN DOING THINGS: NOT AT ALL
SUM OF ALL RESPONSES TO PHQ QUESTIONS 1-9: 0
SUM OF ALL RESPONSES TO PHQ QUESTIONS 1-9: 0
2. FEELING DOWN, DEPRESSED OR HOPELESS: NOT AT ALL
SUM OF ALL RESPONSES TO PHQ QUESTIONS 1-9: 0
SUM OF ALL RESPONSES TO PHQ QUESTIONS 1-9: 0

## 2025-03-25 NOTE — PROGRESS NOTES
\"Have you been to the ER, urgent care clinic since your last visit?  Hospitalized since your last visit?\"    NO    “Have you seen or consulted any other health care providers outside our system since your last visit?”    NO               Chief Complaint   Patient presents with    Arm Injury     Still having issues in L arm from when she fell last year .    Diabetes

## 2025-03-26 DIAGNOSIS — I10 ESSENTIAL HYPERTENSION: Primary | ICD-10-CM

## 2025-03-29 LAB
BASOPHILS # BLD AUTO: 0.1 X10E3/UL (ref 0–0.2)
BASOPHILS NFR BLD AUTO: 1 %
EOSINOPHIL # BLD AUTO: 0.2 X10E3/UL (ref 0–0.4)
EOSINOPHIL NFR BLD AUTO: 1 %
ERYTHROCYTE [DISTWIDTH] IN BLOOD BY AUTOMATED COUNT: 12.6 % (ref 11.7–15.4)
HCT VFR BLD AUTO: 36.7 % (ref 34–46.6)
HGB BLD-MCNC: 12 G/DL (ref 11.1–15.9)
IMM GRANULOCYTES # BLD AUTO: 0 X10E3/UL (ref 0–0.1)
IMM GRANULOCYTES NFR BLD AUTO: 0 %
LYMPHOCYTES # BLD AUTO: 2 X10E3/UL (ref 0.7–3.1)
LYMPHOCYTES NFR BLD AUTO: 17 %
MCH RBC QN AUTO: 29.9 PG (ref 26.6–33)
MCHC RBC AUTO-ENTMCNC: 32.7 G/DL (ref 31.5–35.7)
MCV RBC AUTO: 92 FL (ref 79–97)
MONOCYTES # BLD AUTO: 0.7 X10E3/UL (ref 0.1–0.9)
MONOCYTES NFR BLD AUTO: 6 %
NEUTROPHILS # BLD AUTO: 8.6 X10E3/UL (ref 1.4–7)
NEUTROPHILS NFR BLD AUTO: 75 %
PLATELET # BLD AUTO: 326 X10E3/UL (ref 150–450)
RBC # BLD AUTO: 4.01 X10E6/UL (ref 3.77–5.28)
TSH SERPL DL<=0.005 MIU/L-ACNC: 0.7 UIU/ML (ref 0.45–4.5)
WBC # BLD AUTO: 11.6 X10E3/UL (ref 3.4–10.8)

## 2025-05-05 ENCOUNTER — OFFICE VISIT (OUTPATIENT)
Facility: CLINIC | Age: 85
End: 2025-05-05
Payer: MEDICARE

## 2025-05-05 VITALS
DIASTOLIC BLOOD PRESSURE: 80 MMHG | BODY MASS INDEX: 25.12 KG/M2 | RESPIRATION RATE: 14 BRPM | OXYGEN SATURATION: 98 % | HEIGHT: 63 IN | WEIGHT: 141.8 LBS | HEART RATE: 71 BPM | TEMPERATURE: 97.9 F | SYSTOLIC BLOOD PRESSURE: 138 MMHG

## 2025-05-05 DIAGNOSIS — Z91.81 AT HIGH RISK FOR FALLS: ICD-10-CM

## 2025-05-05 DIAGNOSIS — S46.012A STRAIN OF ROTATOR CUFF OF LEFT SHOULDER: ICD-10-CM

## 2025-05-05 DIAGNOSIS — F32.1 CURRENT MODERATE EPISODE OF MAJOR DEPRESSIVE DISORDER WITHOUT PRIOR EPISODE (HCC): ICD-10-CM

## 2025-05-05 DIAGNOSIS — I10 ESSENTIAL HYPERTENSION: Primary | ICD-10-CM

## 2025-05-05 DIAGNOSIS — E78.2 MIXED HYPERLIPIDEMIA: ICD-10-CM

## 2025-05-05 PROCEDURE — 1123F ACP DISCUSS/DSCN MKR DOCD: CPT | Performed by: FAMILY MEDICINE

## 2025-05-05 PROCEDURE — 1036F TOBACCO NON-USER: CPT | Performed by: FAMILY MEDICINE

## 2025-05-05 PROCEDURE — G8427 DOCREV CUR MEDS BY ELIG CLIN: HCPCS | Performed by: FAMILY MEDICINE

## 2025-05-05 PROCEDURE — 1090F PRES/ABSN URINE INCON ASSESS: CPT | Performed by: FAMILY MEDICINE

## 2025-05-05 PROCEDURE — G8399 PT W/DXA RESULTS DOCUMENT: HCPCS | Performed by: FAMILY MEDICINE

## 2025-05-05 PROCEDURE — 99214 OFFICE O/P EST MOD 30 MIN: CPT | Performed by: FAMILY MEDICINE

## 2025-05-05 PROCEDURE — 1160F RVW MEDS BY RX/DR IN RCRD: CPT | Performed by: FAMILY MEDICINE

## 2025-05-05 PROCEDURE — 3078F DIAST BP <80 MM HG: CPT | Performed by: FAMILY MEDICINE

## 2025-05-05 PROCEDURE — G8419 CALC BMI OUT NRM PARAM NOF/U: HCPCS | Performed by: FAMILY MEDICINE

## 2025-05-05 PROCEDURE — 1125F AMNT PAIN NOTED PAIN PRSNT: CPT | Performed by: FAMILY MEDICINE

## 2025-05-05 PROCEDURE — G2211 COMPLEX E/M VISIT ADD ON: HCPCS | Performed by: FAMILY MEDICINE

## 2025-05-05 PROCEDURE — 1159F MED LIST DOCD IN RCRD: CPT | Performed by: FAMILY MEDICINE

## 2025-05-05 PROCEDURE — 3075F SYST BP GE 130 - 139MM HG: CPT | Performed by: FAMILY MEDICINE

## 2025-05-05 RX ORDER — CITALOPRAM HYDROBROMIDE 10 MG/1
10 TABLET ORAL DAILY
Qty: 30 TABLET | Refills: 2 | Status: SHIPPED | OUTPATIENT
Start: 2025-05-05

## 2025-05-05 SDOH — ECONOMIC STABILITY: FOOD INSECURITY: WITHIN THE PAST 12 MONTHS, YOU WORRIED THAT YOUR FOOD WOULD RUN OUT BEFORE YOU GOT MONEY TO BUY MORE.: NEVER TRUE

## 2025-05-05 SDOH — ECONOMIC STABILITY: FOOD INSECURITY: WITHIN THE PAST 12 MONTHS, THE FOOD YOU BOUGHT JUST DIDN'T LAST AND YOU DIDN'T HAVE MONEY TO GET MORE.: NEVER TRUE

## 2025-05-05 ASSESSMENT — PATIENT HEALTH QUESTIONNAIRE - PHQ9
10. IF YOU CHECKED OFF ANY PROBLEMS, HOW DIFFICULT HAVE THESE PROBLEMS MADE IT FOR YOU TO DO YOUR WORK, TAKE CARE OF THINGS AT HOME, OR GET ALONG WITH OTHER PEOPLE: VERY DIFFICULT
SUM OF ALL RESPONSES TO PHQ QUESTIONS 1-9: 13
8. MOVING OR SPEAKING SO SLOWLY THAT OTHER PEOPLE COULD HAVE NOTICED. OR THE OPPOSITE, BEING SO FIGETY OR RESTLESS THAT YOU HAVE BEEN MOVING AROUND A LOT MORE THAN USUAL: SEVERAL DAYS
3. TROUBLE FALLING OR STAYING ASLEEP: NEARLY EVERY DAY
5. POOR APPETITE OR OVEREATING: NOT AT ALL
SUM OF ALL RESPONSES TO PHQ QUESTIONS 1-9: 13
2. FEELING DOWN, DEPRESSED OR HOPELESS: NEARLY EVERY DAY
9. THOUGHTS THAT YOU WOULD BE BETTER OFF DEAD, OR OF HURTING YOURSELF: NOT AT ALL
7. TROUBLE CONCENTRATING ON THINGS, SUCH AS READING THE NEWSPAPER OR WATCHING TELEVISION: MORE THAN HALF THE DAYS
6. FEELING BAD ABOUT YOURSELF - OR THAT YOU ARE A FAILURE OR HAVE LET YOURSELF OR YOUR FAMILY DOWN: NOT AT ALL
4. FEELING TIRED OR HAVING LITTLE ENERGY: MORE THAN HALF THE DAYS
1. LITTLE INTEREST OR PLEASURE IN DOING THINGS: MORE THAN HALF THE DAYS
SUM OF ALL RESPONSES TO PHQ QUESTIONS 1-9: 13
SUM OF ALL RESPONSES TO PHQ QUESTIONS 1-9: 13

## 2025-05-05 NOTE — PROGRESS NOTES
Have you been to the ER, urgent care clinic since your last visit?  Hospitalized since your last visit?   NO    Have you seen or consulted any other health care providers outside our system since your last visit?   NO               Chief Complaint   Patient presents with    Chronic Pain     Gernalized    Depression     Pt states she has a lot on her mind, and not sleeping well at night. Would like to get on a sleep aid to help. Please send to [facility], [place] if you prescribe something today       
07/28/2016            Objective   /80   Pulse 71   Temp 97.9 °F (36.6 °C) (Oral)   Resp 14   Ht 1.6 m (5' 3\")   Wt 64.3 kg (141 lb 12.8 oz)   LMP  (LMP Unknown)   SpO2 98%   BMI 25.12 kg/m²    Physical Exam  Extremities: Left second toe with mild edema         The patient (or guardian, if applicable) and other individuals in attendance with the patient were advised that Artificial Intelligence will be utilized during this visit to record and process the conversation to generate a clinical note. The patient (or guardian, if applicable) and other individuals in attendance at the appointment consented to the use of AI, including the recording.      An electronic signature was used to authenticate this note.    --Ori White MD

## 2025-05-08 ENCOUNTER — TELEPHONE (OUTPATIENT)
Facility: CLINIC | Age: 85
End: 2025-05-08

## 2025-05-08 DIAGNOSIS — S82.64XA CLOSED NONDISPLACED FRACTURE OF LATERAL MALLEOLUS OF RIGHT FIBULA, INITIAL ENCOUNTER: Primary | ICD-10-CM

## 2025-05-08 NOTE — TELEPHONE ENCOUNTER
URGENT RADIOLOGY FINDING RESULT fr [facility];    R ankle / Irregularity - possible fracture. Bones are severely demineralized.

## 2025-06-02 ENCOUNTER — RESULTS FOLLOW-UP (OUTPATIENT)
Facility: CLINIC | Age: 85
End: 2025-06-02

## 2025-06-02 ENCOUNTER — OFFICE VISIT (OUTPATIENT)
Facility: CLINIC | Age: 85
End: 2025-06-02
Payer: MEDICARE

## 2025-06-02 VITALS
BODY MASS INDEX: 24.98 KG/M2 | OXYGEN SATURATION: 97 % | TEMPERATURE: 98.1 F | HEART RATE: 67 BPM | WEIGHT: 141 LBS | RESPIRATION RATE: 14 BRPM | SYSTOLIC BLOOD PRESSURE: 176 MMHG | DIASTOLIC BLOOD PRESSURE: 69 MMHG | HEIGHT: 63 IN

## 2025-06-02 DIAGNOSIS — E11.22 TYPE 2 DIABETES MELLITUS WITH STAGE 3A CHRONIC KIDNEY DISEASE, WITHOUT LONG-TERM CURRENT USE OF INSULIN (HCC): ICD-10-CM

## 2025-06-02 DIAGNOSIS — N18.31 TYPE 2 DIABETES MELLITUS WITH STAGE 3A CHRONIC KIDNEY DISEASE, WITHOUT LONG-TERM CURRENT USE OF INSULIN (HCC): ICD-10-CM

## 2025-06-02 DIAGNOSIS — N18.30 STAGE 3 CHRONIC KIDNEY DISEASE, UNSPECIFIED WHETHER STAGE 3A OR 3B CKD (HCC): ICD-10-CM

## 2025-06-02 DIAGNOSIS — I10 ESSENTIAL HYPERTENSION: ICD-10-CM

## 2025-06-02 DIAGNOSIS — M25.551 ACUTE HIP PAIN, RIGHT: Primary | ICD-10-CM

## 2025-06-02 DIAGNOSIS — N30.00 ACUTE CYSTITIS WITHOUT HEMATURIA: ICD-10-CM

## 2025-06-02 LAB
BILIRUBIN, URINE, POC: NEGATIVE
BLOOD URINE, POC: NORMAL
GLUCOSE URINE, POC: NEGATIVE
HBA1C MFR BLD HPLC: 6.6 %
KETONES, URINE, POC: NEGATIVE
LEUKOCYTE ESTERASE, URINE, POC: NORMAL
NITRITE, URINE, POC: POSITIVE
PH, URINE, POC: 6 (ref 4.6–8)
PROTEIN,URINE, POC: NORMAL
SPECIFIC GRAVITY, URINE, POC: 1.02 (ref 1–1.03)
URINALYSIS CLARITY, POC: NORMAL
URINALYSIS COLOR, POC: YELLOW
UROBILINOGEN, POC: NORMAL

## 2025-06-02 PROCEDURE — G2211 COMPLEX E/M VISIT ADD ON: HCPCS | Performed by: FAMILY MEDICINE

## 2025-06-02 PROCEDURE — 3078F DIAST BP <80 MM HG: CPT | Performed by: FAMILY MEDICINE

## 2025-06-02 PROCEDURE — 1159F MED LIST DOCD IN RCRD: CPT | Performed by: FAMILY MEDICINE

## 2025-06-02 PROCEDURE — G8427 DOCREV CUR MEDS BY ELIG CLIN: HCPCS | Performed by: FAMILY MEDICINE

## 2025-06-02 PROCEDURE — 1090F PRES/ABSN URINE INCON ASSESS: CPT | Performed by: FAMILY MEDICINE

## 2025-06-02 PROCEDURE — 1036F TOBACCO NON-USER: CPT | Performed by: FAMILY MEDICINE

## 2025-06-02 PROCEDURE — 3077F SYST BP >= 140 MM HG: CPT | Performed by: FAMILY MEDICINE

## 2025-06-02 PROCEDURE — 1126F AMNT PAIN NOTED NONE PRSNT: CPT | Performed by: FAMILY MEDICINE

## 2025-06-02 PROCEDURE — 81003 URINALYSIS AUTO W/O SCOPE: CPT | Performed by: FAMILY MEDICINE

## 2025-06-02 PROCEDURE — G8420 CALC BMI NORM PARAMETERS: HCPCS | Performed by: FAMILY MEDICINE

## 2025-06-02 PROCEDURE — 99214 OFFICE O/P EST MOD 30 MIN: CPT | Performed by: FAMILY MEDICINE

## 2025-06-02 PROCEDURE — 1123F ACP DISCUSS/DSCN MKR DOCD: CPT | Performed by: FAMILY MEDICINE

## 2025-06-02 PROCEDURE — 3044F HG A1C LEVEL LT 7.0%: CPT | Performed by: FAMILY MEDICINE

## 2025-06-02 PROCEDURE — G8399 PT W/DXA RESULTS DOCUMENT: HCPCS | Performed by: FAMILY MEDICINE

## 2025-06-02 RX ORDER — NITROFURANTOIN 25; 75 MG/1; MG/1
100 CAPSULE ORAL 2 TIMES DAILY
Qty: 14 CAPSULE | Refills: 0 | Status: SHIPPED | OUTPATIENT
Start: 2025-06-02 | End: 2025-06-09

## 2025-06-02 RX ORDER — PREDNISONE 20 MG/1
20 TABLET ORAL DAILY
Qty: 6 TABLET | Refills: 0 | Status: SHIPPED | OUTPATIENT
Start: 2025-06-02 | End: 2025-06-02 | Stop reason: ALTCHOICE

## 2025-06-02 ASSESSMENT — PATIENT HEALTH QUESTIONNAIRE - PHQ9
1. LITTLE INTEREST OR PLEASURE IN DOING THINGS: NOT AT ALL
SUM OF ALL RESPONSES TO PHQ QUESTIONS 1-9: 0
10. IF YOU CHECKED OFF ANY PROBLEMS, HOW DIFFICULT HAVE THESE PROBLEMS MADE IT FOR YOU TO DO YOUR WORK, TAKE CARE OF THINGS AT HOME, OR GET ALONG WITH OTHER PEOPLE: NOT DIFFICULT AT ALL
SUM OF ALL RESPONSES TO PHQ QUESTIONS 1-9: 0
4. FEELING TIRED OR HAVING LITTLE ENERGY: NOT AT ALL
9. THOUGHTS THAT YOU WOULD BE BETTER OFF DEAD, OR OF HURTING YOURSELF: NOT AT ALL
SUM OF ALL RESPONSES TO PHQ QUESTIONS 1-9: 0
SUM OF ALL RESPONSES TO PHQ QUESTIONS 1-9: 0
6. FEELING BAD ABOUT YOURSELF - OR THAT YOU ARE A FAILURE OR HAVE LET YOURSELF OR YOUR FAMILY DOWN: NOT AT ALL
2. FEELING DOWN, DEPRESSED OR HOPELESS: NOT AT ALL
5. POOR APPETITE OR OVEREATING: NOT AT ALL
8. MOVING OR SPEAKING SO SLOWLY THAT OTHER PEOPLE COULD HAVE NOTICED. OR THE OPPOSITE, BEING SO FIGETY OR RESTLESS THAT YOU HAVE BEEN MOVING AROUND A LOT MORE THAN USUAL: NOT AT ALL
3. TROUBLE FALLING OR STAYING ASLEEP: NOT AT ALL
7. TROUBLE CONCENTRATING ON THINGS, SUCH AS READING THE NEWSPAPER OR WATCHING TELEVISION: NOT AT ALL

## 2025-06-02 NOTE — PROGRESS NOTES
Have you been to the ER, urgent care clinic since your last visit?  Hospitalized since your last visit?   NO    Have you seen or consulted any other health care providers outside our system since your last visit?   NO             Chief Complaint   Patient presents with    Groin Pain     Whenever she puts pressure to walk, its very painful. She came into the office in a wheelchair. Onset since Thursday morning. On R side. Swelling also in R ankle as well. She noticed it Thursday.     Hypertension     Blood pressure is elevated. She hasn't taken her BP meds this morning       
    Frequency of experiencing loneliness or isolation: Never   Intimate Partner Violence: Not At Risk (4/10/2023)    Humiliation, Afraid, Rape, and Kick questionnaire     Fear of Current or Ex-Partner: No     Emotionally Abused: No     Physically Abused: No     Sexually Abused: No   Housing Stability: Low Risk  (5/5/2025)    Housing Stability Vital Sign     Unable to Pay for Housing in the Last Year: No     Number of Times Moved in the Last Year: 0     Homeless in the Last Year: No       Objective     BP (!) 176/69   Pulse 67   Temp 98.1 °F (36.7 °C) (Oral)   Resp 14   Ht 1.6 m (5' 3\")   Wt 64 kg (141 lb)   LMP  (LMP Unknown)   SpO2 97%   BMI 24.98 kg/m²   Elderly in a wheelchair  Physical Exam  Neurological: Awake, alert, oriented x4, no focal deficit  Head: Normocephalic, atraumatic  Respiratory: Clear to auscultation, no wheezing, rales or rhonchi  Cardiovascular: Regular rate and rhythm, no murmurs, rubs, or gallops  Extremities: Slight distal swelling in the foot  Musculoskeletal:  strength normal, foot strength normal       See chart for problem list and social history.    The patient (or guardian, if applicable) and other individuals in attendance with the patient were advised that Artificial Intelligence will be utilized during this visit to record and process the conversation to generate a clinical note. The patient (or guardian, if applicable) and other individuals in attendance at the appointment consented to the use of AI, including the recording.      An electronic signature was used to authenticate this note.    --Ori White MD

## 2025-06-03 ENCOUNTER — CLINICAL DOCUMENTATION (OUTPATIENT)
Facility: CLINIC | Age: 85
End: 2025-06-03

## 2025-06-03 DIAGNOSIS — R93.7 ABNORMAL X-RAY OF PELVIS: ICD-10-CM

## 2025-06-03 DIAGNOSIS — M25.551 HIP PAIN, RIGHT: Primary | ICD-10-CM

## 2025-06-03 DIAGNOSIS — R93.89 ABNORMAL CAT SCAN: ICD-10-CM

## 2025-06-03 NOTE — PROGRESS NOTES
Received critical radiology finding from [facility] through fax, Callled [phone] to get the message and accept it.      *Will put report on Dr Mays desk*

## 2025-06-04 ENCOUNTER — CLINICAL DOCUMENTATION (OUTPATIENT)
Facility: CLINIC | Age: 85
End: 2025-06-04

## 2025-06-04 NOTE — PROGRESS NOTES
Chief Complaint   Patient presents with    faxed order for CT right hip     Faxed order for CT scan right hip w/o contrast to [facility] Scheduling at [phone] - confirmation of receipt received by fax  Nica Harding LPN 6/4/2025 8:28 AM

## 2025-06-05 ENCOUNTER — TELEPHONE (OUTPATIENT)
Facility: CLINIC | Age: 85
End: 2025-06-05

## 2025-06-05 DIAGNOSIS — N30.00 ACUTE CYSTITIS WITHOUT HEMATURIA: Primary | ICD-10-CM

## 2025-06-05 LAB
BACTERIA SPEC CULT: ABNORMAL
CC UR VC: ABNORMAL
SERVICE CMNT-IMP: ABNORMAL

## 2025-06-05 RX ORDER — PHENAZOPYRIDINE HYDROCHLORIDE 100 MG/1
100 TABLET, FILM COATED ORAL 3 TIMES DAILY PRN
Qty: 9 TABLET | Refills: 0 | Status: SHIPPED | OUTPATIENT
Start: 2025-06-05 | End: 2025-06-08

## 2025-06-12 DIAGNOSIS — F41.9 ANXIETY: ICD-10-CM

## 2025-06-12 DIAGNOSIS — N31.8 BLADDER HYPERTONICITY: ICD-10-CM

## 2025-06-12 DIAGNOSIS — I10 ESSENTIAL HYPERTENSION: ICD-10-CM

## 2025-06-12 DIAGNOSIS — F32.1 CURRENT MODERATE EPISODE OF MAJOR DEPRESSIVE DISORDER WITHOUT PRIOR EPISODE (HCC): ICD-10-CM

## 2025-06-13 RX ORDER — ALENDRONATE SODIUM 70 MG/1
70 TABLET ORAL
Qty: 12 TABLET | Refills: 3 | Status: SHIPPED | OUTPATIENT
Start: 2025-06-13

## 2025-06-13 RX ORDER — AMLODIPINE BESYLATE 5 MG/1
5 TABLET ORAL DAILY
Qty: 90 TABLET | Refills: 1 | Status: SHIPPED | OUTPATIENT
Start: 2025-06-13

## 2025-06-13 RX ORDER — CITALOPRAM HYDROBROMIDE 10 MG/1
10 TABLET ORAL DAILY
Qty: 90 TABLET | Refills: 0 | Status: SHIPPED | OUTPATIENT
Start: 2025-06-13

## 2025-06-13 RX ORDER — TRAZODONE HYDROCHLORIDE 50 MG/1
50 TABLET ORAL NIGHTLY
Qty: 90 TABLET | Refills: 0 | Status: SHIPPED | OUTPATIENT
Start: 2025-06-13

## 2025-06-13 RX ORDER — OXYBUTYNIN CHLORIDE 5 MG/1
5 TABLET ORAL NIGHTLY
Qty: 90 TABLET | Refills: 1 | Status: SHIPPED | OUTPATIENT
Start: 2025-06-13

## 2025-06-13 RX ORDER — BUSPIRONE HYDROCHLORIDE 5 MG/1
5 TABLET ORAL 2 TIMES DAILY
Qty: 180 TABLET | Refills: 1 | Status: SHIPPED | OUTPATIENT
Start: 2025-06-13

## 2025-06-13 RX ORDER — LISINOPRIL 20 MG/1
20 TABLET ORAL DAILY
Qty: 90 TABLET | Refills: 1 | Status: SHIPPED | OUTPATIENT
Start: 2025-06-13

## 2025-06-13 NOTE — TELEPHONE ENCOUNTER
PCP: Ori White MD    LAST APPT:6/2/2025    Future Appointments   Date Time Provider Department Center   6/25/2025 10:45 AM Ori White MD [facility]       Requested Prescriptions     Pending Prescriptions Disp Refills    traZODone (DESYREL) 50 MG tablet [Pharmacy Med Name: TRAZODONE 50MG TAB] 90 tablet 0    citalopram (CELEXA) 10 MG tablet [Pharmacy Med Name: CITALOPRAM 10MG TAB] 90 tablet 0

## 2025-06-23 DIAGNOSIS — S72.001S CLOSED FRACTURE OF RIGHT HIP, SEQUELA: Primary | ICD-10-CM

## 2025-06-23 DIAGNOSIS — J30.1 SEASONAL ALLERGIC RHINITIS DUE TO POLLEN: ICD-10-CM

## 2025-06-24 RX ORDER — ALENDRONATE SODIUM 70 MG/1
70 TABLET ORAL
Qty: 12 TABLET | Refills: 0 | Status: SHIPPED | OUTPATIENT
Start: 2025-06-24 | End: 2025-09-22

## 2025-06-24 RX ORDER — FLUTICASONE PROPIONATE 50 MCG
2 SPRAY, SUSPENSION (ML) NASAL DAILY
Qty: 48 G | Refills: 3 | Status: SHIPPED | OUTPATIENT
Start: 2025-06-24

## 2025-07-21 NOTE — PROGRESS NOTES
Subjective:     Camila Tolbert is a 84 y.o. female who presents for the following:  Chief Complaint   Patient presents with    Diabetes    Hypertension    Hip Pain     Patient doing well after surgery. Using a walker. Doing well       She has the following problems:  Patient Active Problem List   Diagnosis    Irritable bladder    Essential hypertension    Hip fracture requiring operative repair, left, closed, with routine healing, subsequent encounter    Age-related osteoporosis without current pathological fracture    Mixed hyperlipidemia    Diverticulosis    GERD (gastroesophageal reflux disease)    Type 2 diabetes mellitus with stage 3a chronic kidney disease, without long-term current use of insulin ([facility])    Chronic renal disease, stage III ([facility])            Assessment & Plan  1. Right hip fracture.  - Surgery on the right hip was performed approximately 3 weeks ago; patient has been home for 2 weeks.  - Undergoing daily physical therapy.  - Advised to continue physical therapy regimen and exercise caution due to osteoporosis.    2. Osteoporosis.  - Reminded to be cautious due to brittle bones.  - Currently taking a medication for bones once a week.  - Advised to continue medication as prescribed.    3. Anxiety.  - Currently taking citalopram (Celexa) for mood stabilization.  - Current medications, including citalopram, will be continued and refilled as needed.    4. Urinary incontinence.  - Currently taking oxybutynin (Ditropan) for bladder control.  - Advised to gradually discontinue oxybutynin due to potential increase in fall risk for older individuals.    Follow-up: The patient will follow up in a few months.    Results    1. Type 2 diabetes mellitus with stage 3a chronic kidney disease, without long-term current use of insulin ([facility])  2. Stage 3 chronic kidney disease, unspecified whether stage 3a or 3b CKD ([facility])  3. Essential hypertension  -     amLODIPine (NORVASC) 5 MG tablet; Take 1 tablet by mouth

## 2025-07-22 ENCOUNTER — OFFICE VISIT (OUTPATIENT)
Facility: CLINIC | Age: 85
End: 2025-07-22
Payer: MEDICARE

## 2025-07-22 VITALS
DIASTOLIC BLOOD PRESSURE: 73 MMHG | HEART RATE: 58 BPM | SYSTOLIC BLOOD PRESSURE: 136 MMHG | RESPIRATION RATE: 16 BRPM | WEIGHT: 131.2 LBS | BODY MASS INDEX: 23.25 KG/M2 | TEMPERATURE: 97.9 F | HEIGHT: 63 IN | OXYGEN SATURATION: 95 %

## 2025-07-22 DIAGNOSIS — E78.2 MIXED HYPERLIPIDEMIA: ICD-10-CM

## 2025-07-22 DIAGNOSIS — N18.30 STAGE 3 CHRONIC KIDNEY DISEASE, UNSPECIFIED WHETHER STAGE 3A OR 3B CKD (HCC): ICD-10-CM

## 2025-07-22 DIAGNOSIS — K21.9 GASTRO-ESOPHAGEAL REFLUX DISEASE WITHOUT ESOPHAGITIS: ICD-10-CM

## 2025-07-22 DIAGNOSIS — F41.9 ANXIETY: ICD-10-CM

## 2025-07-22 DIAGNOSIS — F32.1 CURRENT MODERATE EPISODE OF MAJOR DEPRESSIVE DISORDER WITHOUT PRIOR EPISODE (HCC): ICD-10-CM

## 2025-07-22 DIAGNOSIS — E11.22 TYPE 2 DIABETES MELLITUS WITH STAGE 3A CHRONIC KIDNEY DISEASE, WITHOUT LONG-TERM CURRENT USE OF INSULIN (HCC): Primary | ICD-10-CM

## 2025-07-22 DIAGNOSIS — N31.8 BLADDER HYPERTONICITY: ICD-10-CM

## 2025-07-22 DIAGNOSIS — N18.31 TYPE 2 DIABETES MELLITUS WITH STAGE 3A CHRONIC KIDNEY DISEASE, WITHOUT LONG-TERM CURRENT USE OF INSULIN (HCC): Primary | ICD-10-CM

## 2025-07-22 DIAGNOSIS — R25.1 TREMOR OF BOTH HANDS: ICD-10-CM

## 2025-07-22 DIAGNOSIS — I10 ESSENTIAL HYPERTENSION: ICD-10-CM

## 2025-07-22 DIAGNOSIS — S46.012A STRAIN OF ROTATOR CUFF OF LEFT SHOULDER: ICD-10-CM

## 2025-07-22 DIAGNOSIS — K21.9 GASTROESOPHAGEAL REFLUX DISEASE, UNSPECIFIED WHETHER ESOPHAGITIS PRESENT: ICD-10-CM

## 2025-07-22 PROCEDURE — G8427 DOCREV CUR MEDS BY ELIG CLIN: HCPCS | Performed by: FAMILY MEDICINE

## 2025-07-22 PROCEDURE — 3078F DIAST BP <80 MM HG: CPT | Performed by: FAMILY MEDICINE

## 2025-07-22 PROCEDURE — 1126F AMNT PAIN NOTED NONE PRSNT: CPT | Performed by: FAMILY MEDICINE

## 2025-07-22 PROCEDURE — 1036F TOBACCO NON-USER: CPT | Performed by: FAMILY MEDICINE

## 2025-07-22 PROCEDURE — 99214 OFFICE O/P EST MOD 30 MIN: CPT | Performed by: FAMILY MEDICINE

## 2025-07-22 PROCEDURE — 1159F MED LIST DOCD IN RCRD: CPT | Performed by: FAMILY MEDICINE

## 2025-07-22 PROCEDURE — G8399 PT W/DXA RESULTS DOCUMENT: HCPCS | Performed by: FAMILY MEDICINE

## 2025-07-22 PROCEDURE — G8420 CALC BMI NORM PARAMETERS: HCPCS | Performed by: FAMILY MEDICINE

## 2025-07-22 PROCEDURE — 1090F PRES/ABSN URINE INCON ASSESS: CPT | Performed by: FAMILY MEDICINE

## 2025-07-22 PROCEDURE — 3075F SYST BP GE 130 - 139MM HG: CPT | Performed by: FAMILY MEDICINE

## 2025-07-22 PROCEDURE — 3044F HG A1C LEVEL LT 7.0%: CPT | Performed by: FAMILY MEDICINE

## 2025-07-22 PROCEDURE — 1123F ACP DISCUSS/DSCN MKR DOCD: CPT | Performed by: FAMILY MEDICINE

## 2025-07-22 RX ORDER — LISINOPRIL 20 MG/1
20 TABLET ORAL DAILY
Qty: 100 TABLET | Refills: 1 | Status: SHIPPED | OUTPATIENT
Start: 2025-07-22

## 2025-07-22 RX ORDER — AMLODIPINE BESYLATE 5 MG/1
5 TABLET ORAL DAILY
Qty: 100 TABLET | Refills: 1 | Status: SHIPPED | OUTPATIENT
Start: 2025-07-22

## 2025-07-22 RX ORDER — CITALOPRAM HYDROBROMIDE 10 MG/1
10 TABLET ORAL DAILY
Qty: 100 TABLET | Refills: 1 | Status: SHIPPED | OUTPATIENT
Start: 2025-07-22

## 2025-07-22 RX ORDER — OMEPRAZOLE 20 MG/1
20 CAPSULE, DELAYED RELEASE ORAL DAILY
Qty: 100 CAPSULE | Refills: 1 | Status: SHIPPED | OUTPATIENT
Start: 2025-07-22

## 2025-07-22 RX ORDER — ALENDRONATE SODIUM 70 MG/1
70 TABLET ORAL
Qty: 12 TABLET | Refills: 1 | Status: SHIPPED | OUTPATIENT
Start: 2025-07-22 | End: 2025-10-20

## 2025-07-22 RX ORDER — PROPRANOLOL HCL 20 MG
20 TABLET ORAL 2 TIMES DAILY
Qty: 200 TABLET | Refills: 1 | Status: SHIPPED | OUTPATIENT
Start: 2025-07-22

## 2025-07-22 RX ORDER — OXYBUTYNIN CHLORIDE 5 MG/1
5 TABLET ORAL NIGHTLY
Qty: 100 TABLET | Refills: 1 | Status: SHIPPED | OUTPATIENT
Start: 2025-07-22

## 2025-07-22 RX ORDER — CALCIUM CARBONATE/VITAMIN D3 500 MG-10
1 TABLET ORAL DAILY
Qty: 100 TABLET | Refills: 1 | Status: SHIPPED | OUTPATIENT
Start: 2025-07-22

## 2025-07-22 RX ORDER — BUSPIRONE HYDROCHLORIDE 5 MG/1
5 TABLET ORAL 2 TIMES DAILY
Qty: 200 TABLET | Refills: 1 | Status: SHIPPED | OUTPATIENT
Start: 2025-07-22

## 2025-07-22 RX ORDER — TRAZODONE HYDROCHLORIDE 50 MG/1
50 TABLET ORAL NIGHTLY
Qty: 100 TABLET | Refills: 1 | Status: SHIPPED | OUTPATIENT
Start: 2025-07-22

## 2025-07-22 ASSESSMENT — PATIENT HEALTH QUESTIONNAIRE - PHQ9
SUM OF ALL RESPONSES TO PHQ QUESTIONS 1-9: 0
10. IF YOU CHECKED OFF ANY PROBLEMS, HOW DIFFICULT HAVE THESE PROBLEMS MADE IT FOR YOU TO DO YOUR WORK, TAKE CARE OF THINGS AT HOME, OR GET ALONG WITH OTHER PEOPLE: NOT DIFFICULT AT ALL
1. LITTLE INTEREST OR PLEASURE IN DOING THINGS: NOT AT ALL
6. FEELING BAD ABOUT YOURSELF - OR THAT YOU ARE A FAILURE OR HAVE LET YOURSELF OR YOUR FAMILY DOWN: NOT AT ALL
SUM OF ALL RESPONSES TO PHQ QUESTIONS 1-9: 0
8. MOVING OR SPEAKING SO SLOWLY THAT OTHER PEOPLE COULD HAVE NOTICED. OR THE OPPOSITE, BEING SO FIGETY OR RESTLESS THAT YOU HAVE BEEN MOVING AROUND A LOT MORE THAN USUAL: NOT AT ALL
4. FEELING TIRED OR HAVING LITTLE ENERGY: NOT AT ALL
2. FEELING DOWN, DEPRESSED OR HOPELESS: NOT AT ALL
5. POOR APPETITE OR OVEREATING: NOT AT ALL
SUM OF ALL RESPONSES TO PHQ QUESTIONS 1-9: 0
7. TROUBLE CONCENTRATING ON THINGS, SUCH AS READING THE NEWSPAPER OR WATCHING TELEVISION: NOT AT ALL
9. THOUGHTS THAT YOU WOULD BE BETTER OFF DEAD, OR OF HURTING YOURSELF: NOT AT ALL
SUM OF ALL RESPONSES TO PHQ QUESTIONS 1-9: 0
3. TROUBLE FALLING OR STAYING ASLEEP: NOT AT ALL

## 2025-07-22 NOTE — PROGRESS NOTES
Have you been to the ER, urgent care clinic since your last visit?  Hospitalized since your last visit?   NO    Have you seen or consulted any other health care providers outside our system since your last visit?   NO             Chief Complaint   Patient presents with    Diabetes    Hypertension    Hip Pain     Patient doing well after surgery. Using a walker. Doing well

## 2025-08-25 DIAGNOSIS — F32.1 CURRENT MODERATE EPISODE OF MAJOR DEPRESSIVE DISORDER WITHOUT PRIOR EPISODE (HCC): ICD-10-CM

## 2025-08-25 RX ORDER — TRAZODONE HYDROCHLORIDE 50 MG/1
50 TABLET ORAL NIGHTLY
Qty: 90 TABLET | Refills: 1 | Status: SHIPPED | OUTPATIENT
Start: 2025-08-25

## 2025-08-25 RX ORDER — CITALOPRAM HYDROBROMIDE 10 MG/1
10 TABLET ORAL DAILY
Qty: 90 TABLET | Refills: 1 | Status: SHIPPED | OUTPATIENT
Start: 2025-08-25

## (undated) DEVICE — SUTURE VCRL SZ 3-0 L18IN ABSRB UD PS-2 L19MM 3/8 CRV PRIM J497H

## (undated) DEVICE — TUBING, SUCTION, 1/4" X 10', STRAIGHT: Brand: MEDLINE

## (undated) DEVICE — BLADE ASSEMB CLP HAIR FINE --

## (undated) DEVICE — SYR 10ML LUER LOK 1/5ML GRAD --

## (undated) DEVICE — FRAZIER SUCTION INSTRUMENT 7 FR W/CONTROL VENT & OBTURATOR: Brand: FRAZIER

## (undated) DEVICE — MICRODISSECTION NEEDLE STRAIGHT SLEEVE: Brand: COLORADO

## (undated) DEVICE — INFECTION CONTROL KIT SYS

## (undated) DEVICE — STERILE POLYISOPRENE POWDER-FREE SURGICAL GLOVES: Brand: PROTEXIS

## (undated) DEVICE — NEEDLE HYPO 18GA L1.5IN PNK S STL HUB POLYPR SHLD REG BVL

## (undated) DEVICE — SUTURE PERMA-HAND SZ 3-0 L18IN NONABSORBABLE BLK L24MM PS-1 1684G

## (undated) DEVICE — SUT PROL 3-0 18IN PS2 BLU --

## (undated) DEVICE — STRAP,POSITIONING,KNEE/BODY,FOAM,4X60": Brand: MEDLINE

## (undated) DEVICE — TOWEL SURG W17XL27IN STD BLU COT NONFENESTRATED PREWASHED

## (undated) DEVICE — PACK,EENT,TURBAN DRAPE,PK II: Brand: MEDLINE

## (undated) DEVICE — SYRINGE,EAR/ULCER, 2 OZ, STERILE: Brand: MEDLINE

## (undated) DEVICE — SOLUTION IV 1000ML 0.9% SOD CHL

## (undated) DEVICE — SPONGE GZ W4XL4IN COT 12 PLY TYP VII WVN C FLD DSGN

## (undated) DEVICE — HANDLE LT SNAP ON ULT DURABLE LENS FOR TRUMPF ALC DISPOSABLE

## (undated) DEVICE — Device

## (undated) DEVICE — SOLUTION SCRB 2OZ 10% POVIDONE IOD ANTIMIC BTL

## (undated) DEVICE — REM POLYHESIVE ADULT PATIENT RETURN ELECTRODE: Brand: VALLEYLAB

## (undated) DEVICE — GARMENT,MEDLINE,DVT,INT,CALF,MED, GEN2: Brand: MEDLINE